# Patient Record
Sex: MALE | Race: WHITE | Employment: OTHER | URBAN - METROPOLITAN AREA
[De-identification: names, ages, dates, MRNs, and addresses within clinical notes are randomized per-mention and may not be internally consistent; named-entity substitution may affect disease eponyms.]

---

## 2017-03-01 ENCOUNTER — LAB ENCOUNTER (OUTPATIENT)
Dept: LAB | Age: 77
End: 2017-03-01
Attending: NURSE PRACTITIONER
Payer: MEDICARE

## 2017-03-01 DIAGNOSIS — Z13.29 SCREENING FOR THYROID DISORDER: ICD-10-CM

## 2017-03-01 DIAGNOSIS — Z13.0 SCREENING FOR BLOOD DISEASE: ICD-10-CM

## 2017-03-01 DIAGNOSIS — Z13.228 SCREENING FOR METABOLIC DISORDER: ICD-10-CM

## 2017-03-01 DIAGNOSIS — Z13.220 SCREENING FOR LIPID DISORDERS: ICD-10-CM

## 2017-03-01 DIAGNOSIS — Z12.5 SCREENING FOR PROSTATE CANCER: ICD-10-CM

## 2017-03-01 LAB
ALBUMIN SERPL-MCNC: 3.7 G/DL (ref 3.5–4.8)
ALP LIVER SERPL-CCNC: 58 U/L (ref 45–117)
ALT SERPL-CCNC: 30 U/L (ref 17–63)
AST SERPL-CCNC: 24 U/L (ref 15–41)
BASOPHILS # BLD AUTO: 0.02 X10(3) UL (ref 0–0.1)
BASOPHILS NFR BLD AUTO: 0.3 %
BILIRUB SERPL-MCNC: 0.7 MG/DL (ref 0.1–2)
BUN BLD-MCNC: 19 MG/DL (ref 8–20)
CALCIUM BLD-MCNC: 8.9 MG/DL (ref 8.3–10.3)
CHLORIDE: 110 MMOL/L (ref 101–111)
CHOLEST SMN-MCNC: 194 MG/DL (ref ?–200)
CO2: 29 MMOL/L (ref 22–32)
COMPLEXED PSA SERPL-MCNC: 2.91 NG/ML (ref 0.01–4)
CREAT BLD-MCNC: 1.09 MG/DL (ref 0.7–1.3)
EOSINOPHIL # BLD AUTO: 0.17 X10(3) UL (ref 0–0.3)
EOSINOPHIL NFR BLD AUTO: 2.7 %
ERYTHROCYTE [DISTWIDTH] IN BLOOD BY AUTOMATED COUNT: 12.6 % (ref 11.5–16)
GLUCOSE BLD-MCNC: 101 MG/DL (ref 70–99)
HCT VFR BLD AUTO: 47.2 % (ref 37–53)
HDLC SERPL-MCNC: 52 MG/DL (ref 45–?)
HDLC SERPL: 3.73 {RATIO} (ref ?–4.97)
HGB BLD-MCNC: 16.3 G/DL (ref 13–17)
IMMATURE GRANULOCYTE COUNT: 0.03 X10(3) UL (ref 0–1)
IMMATURE GRANULOCYTE RATIO %: 0.5 %
LDLC SERPL CALC-MCNC: 113 MG/DL (ref ?–130)
LYMPHOCYTES # BLD AUTO: 2.34 X10(3) UL (ref 0.9–4)
LYMPHOCYTES NFR BLD AUTO: 37.4 %
M PROTEIN MFR SERPL ELPH: 7.2 G/DL (ref 6.1–8.3)
MCH RBC QN AUTO: 31.8 PG (ref 27–33.2)
MCHC RBC AUTO-ENTMCNC: 34.5 G/DL (ref 31–37)
MCV RBC AUTO: 92.2 FL (ref 80–99)
MONOCYTES # BLD AUTO: 0.54 X10(3) UL (ref 0.1–0.6)
MONOCYTES NFR BLD AUTO: 8.6 %
NEUTROPHIL ABS PRELIM: 3.15 X10 (3) UL (ref 1.3–6.7)
NEUTROPHILS # BLD AUTO: 3.15 X10(3) UL (ref 1.3–6.7)
NEUTROPHILS NFR BLD AUTO: 50.5 %
NONHDLC SERPL-MCNC: 142 MG/DL (ref ?–130)
PLATELET # BLD AUTO: 189 10(3)UL (ref 150–450)
POTASSIUM SERPL-SCNC: 4.2 MMOL/L (ref 3.6–5.1)
RBC # BLD AUTO: 5.12 X10(6)UL (ref 3.8–5.8)
RED CELL DISTRIBUTION WIDTH-SD: 42.7 FL (ref 35.1–46.3)
SODIUM SERPL-SCNC: 146 MMOL/L (ref 136–144)
TRIGLYCERIDES: 146 MG/DL (ref ?–150)
TSI SER-ACNC: 3.54 MIU/ML (ref 0.35–5.5)
VLDL: 29 MG/DL (ref 5–40)
WBC # BLD AUTO: 6.3 X10(3) UL (ref 4–13)

## 2017-03-01 PROCEDURE — 80061 LIPID PANEL: CPT

## 2017-03-01 PROCEDURE — 36415 COLL VENOUS BLD VENIPUNCTURE: CPT

## 2017-03-01 PROCEDURE — 80053 COMPREHEN METABOLIC PANEL: CPT

## 2017-03-01 PROCEDURE — 85025 COMPLETE CBC W/AUTO DIFF WBC: CPT

## 2017-03-01 PROCEDURE — 84443 ASSAY THYROID STIM HORMONE: CPT

## 2017-03-05 PROBLEM — R73.9 HYPERGLYCEMIA: Status: ACTIVE | Noted: 2017-03-05

## 2017-03-05 PROBLEM — N52.9 ED (ERECTILE DYSFUNCTION): Status: ACTIVE | Noted: 2017-03-05

## 2017-03-06 ENCOUNTER — OFFICE VISIT (OUTPATIENT)
Dept: INTERNAL MEDICINE CLINIC | Facility: CLINIC | Age: 77
End: 2017-03-06

## 2017-03-06 VITALS
TEMPERATURE: 98 F | DIASTOLIC BLOOD PRESSURE: 72 MMHG | HEIGHT: 69 IN | RESPIRATION RATE: 16 BRPM | HEART RATE: 68 BPM | BODY MASS INDEX: 28.07 KG/M2 | SYSTOLIC BLOOD PRESSURE: 132 MMHG | WEIGHT: 189.5 LBS

## 2017-03-06 DIAGNOSIS — E78.5 DYSLIPIDEMIA: ICD-10-CM

## 2017-03-06 DIAGNOSIS — E55.9 VITAMIN D DEFICIENCY: ICD-10-CM

## 2017-03-06 DIAGNOSIS — Z00.00 ROUTINE GENERAL MEDICAL EXAMINATION AT A HEALTH CARE FACILITY: Primary | ICD-10-CM

## 2017-03-06 DIAGNOSIS — R73.9 HYPERGLYCEMIA: ICD-10-CM

## 2017-03-06 DIAGNOSIS — Z12.11 SCREEN FOR COLON CANCER: ICD-10-CM

## 2017-03-06 DIAGNOSIS — R03.0 ELEVATED BP WITHOUT DIAGNOSIS OF HYPERTENSION: ICD-10-CM

## 2017-03-06 DIAGNOSIS — N40.0 BENIGN PROSTATIC HYPERPLASIA, PRESENCE OF LOWER URINARY TRACT SYMPTOMS UNSPECIFIED, UNSPECIFIED MORPHOLOGY: ICD-10-CM

## 2017-03-06 PROCEDURE — 90732 PPSV23 VACC 2 YRS+ SUBQ/IM: CPT | Performed by: NURSE PRACTITIONER

## 2017-03-06 PROCEDURE — 90471 IMMUNIZATION ADMIN: CPT | Performed by: NURSE PRACTITIONER

## 2017-03-06 PROCEDURE — 90736 HZV VACCINE LIVE SUBQ: CPT | Performed by: NURSE PRACTITIONER

## 2017-03-06 PROCEDURE — G0009 ADMIN PNEUMOCOCCAL VACCINE: HCPCS | Performed by: NURSE PRACTITIONER

## 2017-03-06 PROCEDURE — G0439 PPPS, SUBSEQ VISIT: HCPCS | Performed by: NURSE PRACTITIONER

## 2017-03-06 NOTE — PROGRESS NOTES
Armand Vargas is a 67year old male who presents for a complete physical exam.     HPI:   Pt complains of    Dyslipidemia  Simvastatin   Chol to goal.  No c/o  Tolerating well. Vitamin d deficiency  Continues to take vit d supplement.       Debi CATARACT      COLONOSCOPY        Family History   Problem Relation Age of Onset   • Heart Disorder Father    • Cancer Mother      leukemia   • Heart Disorder Brother    • Other[other] [OTHER] Brother      buergers   • Behzad Lu Brother auscultation  CARDIO: RRR without murmur  GI: normal BS, soft, no masses, HSM or tenderness  : two descended testes,no masses,no hernia,no penile lesions  RECTAL: prostate not enlarged.  no mass  MUSCULOSKELETAL: back is not tender  EXTREMITIES: no edema

## 2017-03-27 RX ORDER — SIMVASTATIN 10 MG
TABLET ORAL
Qty: 30 TABLET | Refills: 0 | Status: SHIPPED | OUTPATIENT
Start: 2017-03-27 | End: 2017-04-23

## 2017-04-24 RX ORDER — SIMVASTATIN 10 MG
TABLET ORAL
Qty: 90 TABLET | Refills: 3 | Status: SHIPPED | OUTPATIENT
Start: 2017-04-24 | End: 2017-08-23

## 2017-07-07 ENCOUNTER — PATIENT MESSAGE (OUTPATIENT)
Dept: INTERNAL MEDICINE CLINIC | Facility: CLINIC | Age: 77
End: 2017-07-07

## 2017-07-07 NOTE — TELEPHONE ENCOUNTER
From: [facility]  To: MILAGROS Aleman  Sent: 7/7/2017 11:48 AM CDT  Subject: Prescription Question    My pharmacy has not received the prescription renewal for Simvastatin 10 mg. It was due by the end of April (I think) .

## 2017-08-23 ENCOUNTER — OFFICE VISIT (OUTPATIENT)
Dept: INTERNAL MEDICINE CLINIC | Facility: CLINIC | Age: 77
End: 2017-08-23

## 2017-08-23 ENCOUNTER — TELEPHONE (OUTPATIENT)
Dept: INTERNAL MEDICINE CLINIC | Facility: CLINIC | Age: 77
End: 2017-08-23

## 2017-08-23 ENCOUNTER — APPOINTMENT (OUTPATIENT)
Dept: GENERAL RADIOLOGY | Facility: HOSPITAL | Age: 77
DRG: 234 | End: 2017-08-23
Attending: EMERGENCY MEDICINE
Payer: COMMERCIAL

## 2017-08-23 ENCOUNTER — HOSPITAL ENCOUNTER (INPATIENT)
Facility: HOSPITAL | Age: 77
LOS: 11 days | Discharge: HOME HEALTH | DRG: 234 | End: 2017-09-03
Attending: EMERGENCY MEDICINE | Admitting: THORACIC SURGERY (CARDIOTHORACIC VASCULAR SURGERY)
Payer: COMMERCIAL

## 2017-08-23 VITALS
DIASTOLIC BLOOD PRESSURE: 82 MMHG | SYSTOLIC BLOOD PRESSURE: 110 MMHG | BODY MASS INDEX: 28.44 KG/M2 | HEIGHT: 69 IN | HEART RATE: 80 BPM | WEIGHT: 192 LBS | TEMPERATURE: 98 F

## 2017-08-23 DIAGNOSIS — E78.5 DYSLIPIDEMIA: ICD-10-CM

## 2017-08-23 DIAGNOSIS — R07.9 ACUTE CHEST PAIN: Primary | ICD-10-CM

## 2017-08-23 DIAGNOSIS — R07.9 EXERTIONAL CHEST PAIN: Primary | ICD-10-CM

## 2017-08-23 LAB
ALBUMIN SERPL-MCNC: 3.4 G/DL (ref 3.5–4.8)
ALP LIVER SERPL-CCNC: 54 U/L (ref 45–117)
ALT SERPL-CCNC: 38 U/L (ref 17–63)
APTT PPP: 30.4 SECONDS (ref 25–34)
AST SERPL-CCNC: 30 U/L (ref 15–41)
ATRIAL RATE: 71 BPM
BASOPHILS # BLD AUTO: 0.02 X10(3) UL (ref 0–0.1)
BASOPHILS NFR BLD AUTO: 0.3 %
BILIRUB SERPL-MCNC: 0.7 MG/DL (ref 0.1–2)
BUN BLD-MCNC: 19 MG/DL (ref 8–20)
CALCIUM BLD-MCNC: 8.9 MG/DL (ref 8.3–10.3)
CHLORIDE: 110 MMOL/L (ref 101–111)
CO2: 26 MMOL/L (ref 22–32)
CREAT BLD-MCNC: 1.14 MG/DL (ref 0.7–1.3)
EOSINOPHIL # BLD AUTO: 0.15 X10(3) UL (ref 0–0.3)
EOSINOPHIL NFR BLD AUTO: 2.3 %
ERYTHROCYTE [DISTWIDTH] IN BLOOD BY AUTOMATED COUNT: 12.6 % (ref 11.5–16)
GLUCOSE BLD-MCNC: 94 MG/DL (ref 70–99)
HCT VFR BLD AUTO: 44.5 % (ref 37–53)
HGB BLD-MCNC: 15.2 G/DL (ref 13–17)
IMMATURE GRANULOCYTE COUNT: 0.01 X10(3) UL (ref 0–1)
IMMATURE GRANULOCYTE RATIO %: 0.2 %
LYMPHOCYTES # BLD AUTO: 2.25 X10(3) UL (ref 0.9–4)
LYMPHOCYTES NFR BLD AUTO: 34.8 %
M PROTEIN MFR SERPL ELPH: 6.9 G/DL (ref 6.1–8.3)
MCH RBC QN AUTO: 30.9 PG (ref 27–33.2)
MCHC RBC AUTO-ENTMCNC: 34.2 G/DL (ref 31–37)
MCV RBC AUTO: 90.4 FL (ref 80–99)
MONOCYTES # BLD AUTO: 0.52 X10(3) UL (ref 0.1–0.6)
MONOCYTES NFR BLD AUTO: 8 %
NEUTROPHIL ABS PRELIM: 3.51 X10 (3) UL (ref 1.3–6.7)
NEUTROPHILS # BLD AUTO: 3.51 X10(3) UL (ref 1.3–6.7)
NEUTROPHILS NFR BLD AUTO: 54.4 %
P AXIS: 19 DEGREES
P-R INTERVAL: 150 MS
PLATELET # BLD AUTO: 176 10(3)UL (ref 150–450)
POTASSIUM SERPL-SCNC: 4.1 MMOL/L (ref 3.6–5.1)
Q-T INTERVAL: 392 MS
QRS DURATION: 112 MS
QTC CALCULATION (BEZET): 425 MS
R AXIS: -31 DEGREES
RBC # BLD AUTO: 4.92 X10(6)UL (ref 3.8–5.8)
RED CELL DISTRIBUTION WIDTH-SD: 42.2 FL (ref 35.1–46.3)
SODIUM SERPL-SCNC: 142 MMOL/L (ref 136–144)
T AXIS: 46 DEGREES
TROPONIN: 0.05 NG/ML (ref ?–0.05)
TROPONIN: 0.05 NG/ML (ref ?–0.05)
VENTRICULAR RATE: 71 BPM
WBC # BLD AUTO: 6.5 X10(3) UL (ref 4–13)

## 2017-08-23 PROCEDURE — 93000 ELECTROCARDIOGRAM COMPLETE: CPT | Performed by: NURSE PRACTITIONER

## 2017-08-23 PROCEDURE — 99223 1ST HOSP IP/OBS HIGH 75: CPT | Performed by: HOSPITALIST

## 2017-08-23 PROCEDURE — 71010 XR CHEST AP PORTABLE  (CPT=71010): CPT | Performed by: EMERGENCY MEDICINE

## 2017-08-23 PROCEDURE — 99214 OFFICE O/P EST MOD 30 MIN: CPT | Performed by: NURSE PRACTITIONER

## 2017-08-23 RX ORDER — HEPARIN SODIUM 5000 [USP'U]/ML
60 INJECTION INTRAVENOUS; SUBCUTANEOUS ONCE
Status: COMPLETED | OUTPATIENT
Start: 2017-08-23 | End: 2017-08-23

## 2017-08-23 RX ORDER — SODIUM CHLORIDE 9 MG/ML
INJECTION, SOLUTION INTRAVENOUS CONTINUOUS
Status: ACTIVE | OUTPATIENT
Start: 2017-08-23 | End: 2017-08-23

## 2017-08-23 RX ORDER — SODIUM CHLORIDE 9 MG/ML
INJECTION, SOLUTION INTRAVENOUS CONTINUOUS
Status: DISCONTINUED | OUTPATIENT
Start: 2017-08-23 | End: 2017-08-28

## 2017-08-23 RX ORDER — ONDANSETRON 2 MG/ML
4 INJECTION INTRAMUSCULAR; INTRAVENOUS EVERY 6 HOURS PRN
Status: DISCONTINUED | OUTPATIENT
Start: 2017-08-23 | End: 2017-08-28

## 2017-08-23 RX ORDER — HEPARIN SODIUM AND DEXTROSE 10000; 5 [USP'U]/100ML; G/100ML
12 INJECTION INTRAVENOUS ONCE
Status: COMPLETED | OUTPATIENT
Start: 2017-08-23 | End: 2017-08-24

## 2017-08-23 RX ORDER — FOLIC ACID/MULTIVIT,IRON,MINER 0.4MG-18MG
1 TABLET ORAL DAILY
COMMUNITY
End: 2021-02-06

## 2017-08-23 RX ORDER — METOPROLOL TARTRATE 5 MG/5ML
5 INJECTION INTRAVENOUS ONCE
Status: DISCONTINUED | OUTPATIENT
Start: 2017-08-23 | End: 2017-08-23

## 2017-08-23 RX ORDER — AMOXICILLIN 500 MG
1 CAPSULE ORAL DAILY
Status: DISCONTINUED | OUTPATIENT
Start: 2017-08-23 | End: 2017-08-23

## 2017-08-23 RX ORDER — MORPHINE SULFATE 4 MG/ML
2 INJECTION, SOLUTION INTRAMUSCULAR; INTRAVENOUS EVERY 2 HOUR PRN
Status: DISCONTINUED | OUTPATIENT
Start: 2017-08-23 | End: 2017-08-29

## 2017-08-23 RX ORDER — SIMVASTATIN 10 MG
10 TABLET ORAL NIGHTLY
COMMUNITY
End: 2019-06-24

## 2017-08-23 RX ORDER — ACETAMINOPHEN 325 MG/1
650 TABLET ORAL EVERY 6 HOURS PRN
Status: DISCONTINUED | OUTPATIENT
Start: 2017-08-23 | End: 2017-08-28

## 2017-08-23 RX ORDER — MORPHINE SULFATE 4 MG/ML
1 INJECTION, SOLUTION INTRAMUSCULAR; INTRAVENOUS EVERY 2 HOUR PRN
Status: DISCONTINUED | OUTPATIENT
Start: 2017-08-23 | End: 2017-08-29

## 2017-08-23 RX ORDER — PRAVASTATIN SODIUM 20 MG
20 TABLET ORAL NIGHTLY
Status: DISCONTINUED | OUTPATIENT
Start: 2017-08-23 | End: 2017-09-03

## 2017-08-23 RX ORDER — ONDANSETRON 2 MG/ML
4 INJECTION INTRAMUSCULAR; INTRAVENOUS EVERY 4 HOURS PRN
Status: DISCONTINUED | OUTPATIENT
Start: 2017-08-23 | End: 2017-08-23

## 2017-08-23 RX ORDER — HEPARIN SODIUM AND DEXTROSE 10000; 5 [USP'U]/100ML; G/100ML
INJECTION INTRAVENOUS CONTINUOUS
Status: DISCONTINUED | OUTPATIENT
Start: 2017-08-23 | End: 2017-08-28

## 2017-08-23 RX ORDER — MORPHINE SULFATE 4 MG/ML
4 INJECTION, SOLUTION INTRAMUSCULAR; INTRAVENOUS EVERY 2 HOUR PRN
Status: DISCONTINUED | OUTPATIENT
Start: 2017-08-23 | End: 2017-08-29

## 2017-08-23 RX ORDER — NITROGLYCERIN 0.4 MG/1
0.4 TABLET SUBLINGUAL EVERY 5 MIN PRN
Status: DISCONTINUED | OUTPATIENT
Start: 2017-08-23 | End: 2017-08-28

## 2017-08-23 NOTE — ED INITIAL ASSESSMENT (HPI)
66 y/o male to ED with c/o of chest pressure with ambulation that started yesterday night. Patient reports radiation to left shoulder. Patient saw PCP today, normal EKG, sent to ED for further work up. Patient denies dyspnea.

## 2017-08-23 NOTE — ED PROVIDER NOTES
Patient Seen in: [facility] Emergency Department    History   Patient presents with:  Chest Pain Angina (cardiovascular)    Stated Complaint: chest pain with ambulation    HPI  This is a 76-year-old male who arrives here with complaints of chest blake Smokeless tobacco: Never Used                      Alcohol use: No                Review of Systems    Positive for stated complaint: chest pain with ambulation  Other systems are as noted in HPI. Constitutional and vital signs reviewed.       All othe components within normal limits   PTT, ACTIVATED - Normal    Narrative: The aPTT Heparin Therapeutic Range is approximately 65- 104 seconds. The therapeutic range has been validated against 0.3-0.7 heparin anti-Xa units/mL.      CBC W/ DIFFERENTIAL - No on repeat exam he has no complaints of chest pain, shortness of breath the story and concerning for unstable angina.     Disposition and Plan     Clinical Impression:  Acute chest pain  (primary encounter diagnosis)  Unstable angina  Disposition:  Admit

## 2017-08-23 NOTE — H&P
[facility]  History and Physical     Emry Bleak Patient Status:  Emergency    1940 MRN [id number]   Location [address] [facility] Attending Sydni Kay MD   Hosp Day # 0 PCP Sara Jay MD     Chief C tablet Rfl: 3   Omega-3 Fatty Acids (FISH OIL) 1200 MG Oral Cap Take 1 capsule by mouth daily. Disp:  Rfl:        Review of Systems:   A comprehensive 14 point review of systems was completed. Pertinent positives and negatives noted in the HPI.     Physi

## 2017-08-23 NOTE — TELEPHONE ENCOUNTER
3/16/17 with SD Pt to RTC 1year  Pt states that about a week ago he noticed pain \"on the heart region that radiates to left shoulder left shoulder- only when he walks\".  Pain is 4/10 but is not getting worst.   Pt denies n/t, palpitations, swelling of leg

## 2017-08-23 NOTE — TELEPHONE ENCOUNTER
Patient scheduled appt with TB Friday 8/25 for Pain in the heart region when walking. ...okay to wait that long? Please advise?

## 2017-08-23 NOTE — PROGRESS NOTES
Amelie Bentley is a 65year old male. Patient presents with:  Chest Pain: worse when he walks. He's had it for about 1 week      HPI:   Presents for eval of chest pain.    2 weeks ago he was walking when he developed midsternal chest pain with left cough  CARDIOVASCULAR: as above  GI: denies abdominal pain and denies heartburn, no diarrhea or constipation  MUSCULOSKELETAL:  No arthralgias or myalgias  NEURO: denies headaches,     EXAM:   /82 (BP Location: Right arm, Patient Position: Sitting, C

## 2017-08-24 ENCOUNTER — APPOINTMENT (OUTPATIENT)
Dept: INTERVENTIONAL RADIOLOGY/VASCULAR | Facility: HOSPITAL | Age: 77
DRG: 234 | End: 2017-08-24
Attending: INTERNAL MEDICINE
Payer: COMMERCIAL

## 2017-08-24 ENCOUNTER — APPOINTMENT (OUTPATIENT)
Dept: CV DIAGNOSTICS | Facility: HOSPITAL | Age: 77
DRG: 234 | End: 2017-08-24
Attending: HOSPITALIST
Payer: COMMERCIAL

## 2017-08-24 ENCOUNTER — PRIOR ORIGINAL RECORDS (OUTPATIENT)
Dept: OTHER | Age: 77
End: 2017-08-24

## 2017-08-24 ENCOUNTER — APPOINTMENT (OUTPATIENT)
Dept: ULTRASOUND IMAGING | Facility: HOSPITAL | Age: 77
DRG: 234 | End: 2017-08-24
Attending: PHYSICIAN ASSISTANT
Payer: COMMERCIAL

## 2017-08-24 LAB
APTT PPP: 42 SECONDS (ref 25–34)
APTT PPP: 54.9 SECONDS (ref 25–34)
APTT PPP: 97.4 SECONDS (ref 25–34)
ATRIAL RATE: 56 BPM
BASOPHILS # BLD AUTO: 0.02 X10(3) UL (ref 0–0.1)
BASOPHILS NFR BLD AUTO: 0.4 %
BUN BLD-MCNC: 20 MG/DL (ref 8–20)
CALCIUM BLD-MCNC: 8.5 MG/DL (ref 8.3–10.3)
CHLORIDE: 111 MMOL/L (ref 101–111)
CHOLEST SMN-MCNC: 170 MG/DL (ref ?–200)
CO2: 26 MMOL/L (ref 22–32)
CREAT BLD-MCNC: 1.04 MG/DL (ref 0.7–1.3)
EOSINOPHIL # BLD AUTO: 0.15 X10(3) UL (ref 0–0.3)
EOSINOPHIL NFR BLD AUTO: 3 %
ERYTHROCYTE [DISTWIDTH] IN BLOOD BY AUTOMATED COUNT: 12.7 % (ref 11.5–16)
EST. AVERAGE GLUCOSE BLD GHB EST-MCNC: 126 MG/DL (ref 68–126)
GLUCOSE BLD-MCNC: 175 MG/DL (ref 70–99)
HBA1C MFR BLD HPLC: 6 % (ref ?–5.7)
HCT VFR BLD AUTO: 45.1 % (ref 37–53)
HDLC SERPL-MCNC: 44 MG/DL (ref 45–?)
HDLC SERPL: 3.86 {RATIO} (ref ?–4.97)
HGB BLD-MCNC: 15.1 G/DL (ref 13–17)
IMMATURE GRANULOCYTE COUNT: 0.02 X10(3) UL (ref 0–1)
IMMATURE GRANULOCYTE RATIO %: 0.4 %
INR BLD: 1.02 (ref 0.89–1.11)
ISTAT ACTIVATED CLOTTING TIME: 114 SECONDS (ref 74–137)
ISTAT ACTIVATED CLOTTING TIME: 125 SECONDS (ref 74–137)
LDLC SERPL CALC-MCNC: 97 MG/DL (ref ?–130)
LDLC SERPL-MCNC: 29 MG/DL (ref 5–40)
LYMPHOCYTES # BLD AUTO: 1.47 X10(3) UL (ref 0.9–4)
LYMPHOCYTES NFR BLD AUTO: 29.6 %
MCH RBC QN AUTO: 30.7 PG (ref 27–33.2)
MCHC RBC AUTO-ENTMCNC: 33.5 G/DL (ref 31–37)
MCV RBC AUTO: 91.7 FL (ref 80–99)
MONOCYTES # BLD AUTO: 0.32 X10(3) UL (ref 0.1–0.6)
MONOCYTES NFR BLD AUTO: 6.4 %
NEUTROPHIL ABS PRELIM: 2.99 X10 (3) UL (ref 1.3–6.7)
NEUTROPHILS # BLD AUTO: 2.99 X10(3) UL (ref 1.3–6.7)
NEUTROPHILS NFR BLD AUTO: 60.2 %
NONHDLC SERPL-MCNC: 126 MG/DL (ref ?–130)
P AXIS: 23 DEGREES
P-R INTERVAL: 168 MS
PLATELET # BLD AUTO: 164 10(3)UL (ref 150–450)
POTASSIUM SERPL-SCNC: 3.9 MMOL/L (ref 3.6–5.1)
PSA SERPL DL<=0.01 NG/ML-MCNC: 13.4 SECONDS (ref 12–14.3)
Q-T INTERVAL: 460 MS
QRS DURATION: 120 MS
QTC CALCULATION (BEZET): 443 MS
R AXIS: -40 DEGREES
RBC # BLD AUTO: 4.92 X10(6)UL (ref 3.8–5.8)
RED CELL DISTRIBUTION WIDTH-SD: 43 FL (ref 35.1–46.3)
SODIUM SERPL-SCNC: 143 MMOL/L (ref 136–144)
T AXIS: 14 DEGREES
TRIGLYCERIDES: 143 MG/DL (ref ?–150)
TROPONIN: <0.046 NG/ML (ref ?–0.05)
VENTRICULAR RATE: 56 BPM
WBC # BLD AUTO: 5 X10(3) UL (ref 4–13)

## 2017-08-24 PROCEDURE — 4A023N7 MEASUREMENT OF CARDIAC SAMPLING AND PRESSURE, LEFT HEART, PERCUTANEOUS APPROACH: ICD-10-PCS | Performed by: INTERNAL MEDICINE

## 2017-08-24 PROCEDURE — B215YZZ FLUOROSCOPY OF LEFT HEART USING OTHER CONTRAST: ICD-10-PCS | Performed by: INTERNAL MEDICINE

## 2017-08-24 PROCEDURE — 93306 TTE W/DOPPLER COMPLETE: CPT | Performed by: HOSPITALIST

## 2017-08-24 PROCEDURE — 93880 EXTRACRANIAL BILAT STUDY: CPT | Performed by: PHYSICIAN ASSISTANT

## 2017-08-24 PROCEDURE — B211YZZ FLUOROSCOPY OF MULTIPLE CORONARY ARTERIES USING OTHER CONTRAST: ICD-10-PCS | Performed by: INTERNAL MEDICINE

## 2017-08-24 PROCEDURE — 99233 SBSQ HOSP IP/OBS HIGH 50: CPT | Performed by: HOSPITALIST

## 2017-08-24 RX ORDER — ACETAMINOPHEN 325 MG/1
650 TABLET ORAL EVERY 4 HOURS PRN
Status: DISCONTINUED | OUTPATIENT
Start: 2017-08-24 | End: 2017-08-28 | Stop reason: HOSPADM

## 2017-08-24 RX ORDER — ALFUZOSIN HYDROCHLORIDE 10 MG/1
10 TABLET, EXTENDED RELEASE ORAL
Status: DISCONTINUED | OUTPATIENT
Start: 2017-08-25 | End: 2017-09-03

## 2017-08-24 RX ORDER — MIDAZOLAM HYDROCHLORIDE 1 MG/ML
INJECTION INTRAMUSCULAR; INTRAVENOUS
Status: COMPLETED
Start: 2017-08-24 | End: 2017-08-24

## 2017-08-24 RX ORDER — LIDOCAINE HYDROCHLORIDE 10 MG/ML
INJECTION, SOLUTION INFILTRATION; PERINEURAL
Status: COMPLETED
Start: 2017-08-24 | End: 2017-08-24

## 2017-08-24 RX ORDER — SODIUM CHLORIDE 9 MG/ML
INJECTION, SOLUTION INTRAVENOUS CONTINUOUS
Status: ACTIVE | OUTPATIENT
Start: 2017-08-24 | End: 2017-08-24

## 2017-08-24 RX ORDER — ACETAMINOPHEN AND CODEINE PHOSPHATE 300; 30 MG/1; MG/1
1 TABLET ORAL EVERY 4 HOURS PRN
Status: DISCONTINUED | OUTPATIENT
Start: 2017-08-24 | End: 2017-08-28 | Stop reason: HOSPADM

## 2017-08-24 RX ORDER — ASPIRIN 81 MG/1
324 TABLET, CHEWABLE ORAL ONCE
Status: DISCONTINUED | OUTPATIENT
Start: 2017-08-24 | End: 2017-08-24 | Stop reason: CLARIF

## 2017-08-24 RX ORDER — ACETAMINOPHEN AND CODEINE PHOSPHATE 300; 30 MG/1; MG/1
2 TABLET ORAL EVERY 4 HOURS PRN
Status: DISCONTINUED | OUTPATIENT
Start: 2017-08-24 | End: 2017-08-28 | Stop reason: HOSPADM

## 2017-08-24 RX ORDER — HEPARIN SODIUM 5000 [USP'U]/ML
INJECTION, SOLUTION INTRAVENOUS; SUBCUTANEOUS
Status: COMPLETED
Start: 2017-08-24 | End: 2017-08-24

## 2017-08-24 NOTE — PROGRESS NOTES
08/23/17 2017 08/23/17 2019 08/23/17 2020   Vital Signs   Pulse 65 72 72   Heart Rate Source Monitor --  --    Resp 18 --  --    Respiratory Quality Normal --  --    /61  (Simultaneous filing.  User may not have seen previous data.) 147/77  (Simult

## 2017-08-24 NOTE — PLAN OF CARE
CARDIOVASCULAR - ADULT    • Maintains optimal cardiac output and hemodynamic stability Not Progressing    • Absence of cardiac arrhythmias or at baseline Not Progressing        PAIN - ADULT    • Verbalizes/displays adequate comfort level or patient's state

## 2017-08-24 NOTE — PAYOR COMM NOTE
--------------  ADMISSION REVIEW     Payor: [address] [address]  Subscriber #:  [id number]  Authorization Number: N/A    Admit date: 8/23/17  Admit time: 1955       Admitting Physician: Bhavik Salas MD  Attending Physician:  Boy Stokes MD  Johnathon Sender daily.   simvastatin 10 MG Oral Tab,  Take 10 mg by mouth nightly.     Family History   Problem Relation Age of Onset   • Heart Disorder Father    • Cancer Mother      leukemia   • Heart Disorder Brother    • Other[other] [OTHER] Brother      buergers • O limits   TROPONIN I - Abnormal; Notable for the following:     Troponin 0.054 (*)     All other components within normal limits   TROPONIN I - Abnormal; Notable for the following:     Troponin 0.049 (*)     All other components within normal limits   PTT, Emergency   Location [facility] [facility] Attending Erica Ervin MD   Hosp Day # 0 PCP Janyce Krabbe, MD     Chief Complaint: Chest pain    History of Present Illness: Elton Cough is a 65year old male with dyslipidemia wh CREATSERUM  1.14   CA  8.9   ALB  3.4*   NA  142   K  4.1   CL  110   CO2  26.0   ALKPHO  54   AST  30   ALT  38   BILT  0.7   TP  6.9   Estimated Creatinine Clearance: 53.4 mL/min (based on SCr of 1.14 mg/dL).   No results for input(s): PTP, INR in the l Leslie RN      metoprolol succinate (Toprol XL) partial tablet 12.5 mg     Date Action Dose Route User    8/23/2017 1842 Given 12.5 mg Oral Doorteo Au RN      metoprolol Tartrate (LOPRESSOR) tab 25 mg     Date Action Dose Route User    8/24/2017 737

## 2017-08-24 NOTE — ED NOTES
Report given to floor RN, patient and wife updated on bed assignment, patient awaiting transportation.

## 2017-08-24 NOTE — CONSULTS
[facility]  Report of Consultation    Elaine Lin Patient Status:  Inpatient    1940 MRN [id number]   [facility] 8NE-A Attending Yojana Sheldon MD   Hosp Day # 0 PCP Heron Silva MD     Reason for Consultation: mg, Oral, Q6H PRN  •  ondansetron HCl (ZOFRAN) injection 4 mg, 4 mg, Intravenous, Q6H PRN  •  heparin (PORCINE) 91868lialo/250mL infusion ED INITIAL DOSE, 12 Units/kg/hr, Intravenous, Once  •  heparin (PORCINE) drip 15314ujxnf/250mL infusion CONTINUOUS, 20

## 2017-08-24 NOTE — PROGRESS NOTES
[facility]  Progress Note     Osmel Mark Patient Status:  Inpatient    1940 MRN [id number]   [facility] 8NE-A Attending Angelica Byrnes MD   Hosp Day # 1 PCP Savage Ashley MD     Chief Complaint:   Chest pain x1 w      Plan of care:   [facility]  Now   Heparin drip   Echo results P   BB   Add A1c to labs     Quality:  · DVT Prophylaxis: heparin drip  · CODE status: full  · Partida: no  · Central line: no      Estimated date of discharge:  TBD  Discharge is dependent on: fi

## 2017-08-24 NOTE — PROCEDURES
Procedure dictated. ASA anaphylaxis history. Unstable angina pectoris. Troponin 0.05.     LHC  LV-gram    99% prox LAD  Moderate mid LAD disease at the diag bifurcation  No other significant CAD angiographically    LVEF 60%, with becky-lateral and api

## 2017-08-24 NOTE — PROGRESS NOTES
BACK FROM CATH LAB,ALERT BUT SLEEPY,RIGHT GROIN WITH SHEATH X1 IN PLACE,NO BLEEDING OR HEMATOMA,PEDAL PULSES PALPABLE,IV NS AT 50 CC/HR,DENIES C/O CHEST PAIN,TELE SB WITH HR-50S,ROUTINE POST-CATH CARE RENDERED,BEDREST FOR NOW,WILL CHECK FOR ACT AND PULL SH

## 2017-08-24 NOTE — CM/SW NOTE
Rounds- spoke w/ RN  plan cath today, no other needs     Sunday Montiel RN/CM  NICU  [id number] [phone]

## 2017-08-24 NOTE — PROGRESS NOTES
CV Surgery     Pt seen and examined by Dr Ramon Del Toro. PLAN for CABG on Monday.          Ruba Fisher PA-C/Ozzy   8/24/2017  3:46 PM

## 2017-08-24 NOTE — PROCEDURES
[facility]    PATIENT'S NAME: Luis Armando Miller   ATTENDING PHYSICIAN: Elda Dodd M.D. OPERATING PHYSICIAN: Roque Gray M.D.    PATIENT ACCOUNT#:   [de-identified]    LOCATION:  [address] [place] [address]  MEDICAL RECORD #:   [id number]       DATE OF rate of 68 beats per minute. SELECTIVE CORONARY ANGIOGRAPHY FINDINGS:  Left main coronary artery had no  disease angiographically. LAD artery had 99% proximal stenosis pinching the ostium of the first septal  branch.   The mid LAD artery had d catheterization lab since he has allergy to aspirin with anaphylactic reaction and desensitization was not advised. Brilinta was chosen since there is a higher risk of in-stent thrombosis on single antiplatelet agent.   Brilinta seems to be safer, but ther

## 2017-08-25 PROBLEM — I25.10 CAD (CORONARY ARTERY DISEASE): Status: ACTIVE | Noted: 2017-08-25

## 2017-08-25 LAB
APTT PPP: 63 SECONDS (ref 25–34)
BASOPHILS # BLD AUTO: 0.02 X10(3) UL (ref 0–0.1)
BASOPHILS NFR BLD AUTO: 0.3 %
BUN BLD-MCNC: 18 MG/DL (ref 8–20)
CALCIUM BLD-MCNC: 8.1 MG/DL (ref 8.3–10.3)
CHLORIDE: 113 MMOL/L (ref 101–111)
CO2: 24 MMOL/L (ref 22–32)
CREAT BLD-MCNC: 0.9 MG/DL (ref 0.7–1.3)
EOSINOPHIL # BLD AUTO: 0.18 X10(3) UL (ref 0–0.3)
EOSINOPHIL NFR BLD AUTO: 2.4 %
ERYTHROCYTE [DISTWIDTH] IN BLOOD BY AUTOMATED COUNT: 12.7 % (ref 11.5–16)
GLUCOSE BLD-MCNC: 108 MG/DL (ref 70–99)
HCT VFR BLD AUTO: 41.2 % (ref 37–53)
HGB BLD-MCNC: 14.3 G/DL (ref 13–17)
IMMATURE GRANULOCYTE COUNT: 0.02 X10(3) UL (ref 0–1)
IMMATURE GRANULOCYTE RATIO %: 0.3 %
LYMPHOCYTES # BLD AUTO: 1.84 X10(3) UL (ref 0.9–4)
LYMPHOCYTES NFR BLD AUTO: 24.9 %
MCH RBC QN AUTO: 31.2 PG (ref 27–33.2)
MCHC RBC AUTO-ENTMCNC: 34.7 G/DL (ref 31–37)
MCV RBC AUTO: 90 FL (ref 80–99)
MONOCYTES # BLD AUTO: 0.62 X10(3) UL (ref 0.1–0.6)
MONOCYTES NFR BLD AUTO: 8.4 %
NEUTROPHIL ABS PRELIM: 4.7 X10 (3) UL (ref 1.3–6.7)
NEUTROPHILS # BLD AUTO: 4.7 X10(3) UL (ref 1.3–6.7)
NEUTROPHILS NFR BLD AUTO: 63.7 %
P2Y12 REACTION UNITS: 144 PRU
PLATELET # BLD AUTO: 155 10(3)UL (ref 150–450)
POTASSIUM SERPL-SCNC: 3.8 MMOL/L (ref 3.6–5.1)
RBC # BLD AUTO: 4.58 X10(6)UL (ref 3.8–5.8)
RED CELL DISTRIBUTION WIDTH-SD: 41.9 FL (ref 35.1–46.3)
SODIUM SERPL-SCNC: 142 MMOL/L (ref 136–144)
TROPONIN: <0.046 NG/ML (ref ?–0.05)
WBC # BLD AUTO: 7.4 X10(3) UL (ref 4–13)

## 2017-08-25 PROCEDURE — 99232 SBSQ HOSP IP/OBS MODERATE 35: CPT | Performed by: HOSPITALIST

## 2017-08-25 RX ORDER — POTASSIUM CHLORIDE 20 MEQ/1
40 TABLET, EXTENDED RELEASE ORAL ONCE
Status: COMPLETED | OUTPATIENT
Start: 2017-08-25 | End: 2017-08-25

## 2017-08-25 NOTE — PROGRESS NOTES
[facility]  Progress Note     Flip Cisneros Patient Status:  Inpatient    1940 MRN [id number]   [facility] 8NE-A Attending Emily Stubbs MD   Hosp Day # 2 PCP Cony Archibald MD     Chief Complaint:   Chest pain x1 w breakfast   • Pravastatin Sodium  20 mg Oral Nightly   • metoprolol Tartrate  25 mg Oral 2x Daily(Beta Blocker)       ASSESSMENT / PLAN:     1. NSTEMI- disease LAD     1. Cards following/[facility] following  2. CABg Monday   3. Heparin drip  4.  ASA allergy- will

## 2017-08-25 NOTE — CM/SW NOTE
Pt is a 64 y/o male, admitted for chest pain. Per pt's RN, pt will have open heart sx w/ Dr. Rebel Macias on Monday 8/28. SW self-referred to meet w/ pt due to case finding and diagnosis. Pt's wife was also present during the assessment.      Pt live w/ his supporti

## 2017-08-25 NOTE — PROGRESS NOTES
CARDIOVASCULAR - ADULT     • Maintains optimal cardiac output and hemodynamic stability Not Progressing     • Absence of cardiac arrhythmias or at baseline Not Progressing           PAIN - ADULT     • Verbalizes/displays adequate comfort level or patient's

## 2017-08-25 NOTE — PROGRESS NOTES
· [facility] Progress Note     Subjective:  Just out of shower - no CP    Objective:  112/54  Afebrile  SB/Sr  I/O equal     Troponin normal.  PTT 63  BUN/cr 18/0.90    Neuro:awake/alert  HEENT:no JVD  Cardiac:S1 S2 regular  Lungs: clear  Abdo

## 2017-08-26 LAB
APTT PPP: 62.8 SECONDS (ref 25–34)
BILIRUB UR QL STRIP.AUTO: NEGATIVE
COLOR UR AUTO: YELLOW
GLUCOSE UR STRIP.AUTO-MCNC: NEGATIVE MG/DL
KETONES UR STRIP.AUTO-MCNC: NEGATIVE MG/DL
NITRITE UR QL STRIP.AUTO: POSITIVE
P2Y12 REACTION UNITS: 176 PRU
PH UR STRIP.AUTO: 6 [PH] (ref 4.5–8)
POTASSIUM SERPL-SCNC: 4.3 MMOL/L (ref 3.6–5.1)
PROT UR STRIP.AUTO-MCNC: NEGATIVE MG/DL
RBC UR QL AUTO: NEGATIVE
SP GR UR STRIP.AUTO: 1.02 (ref 1–1.03)
UROBILINOGEN UR STRIP.AUTO-MCNC: <2 MG/DL
WBC #/AREA URNS AUTO: >50 /HPF
WBC CLUMPS UR QL AUTO: PRESENT

## 2017-08-26 PROCEDURE — 99232 SBSQ HOSP IP/OBS MODERATE 35: CPT | Performed by: HOSPITALIST

## 2017-08-26 NOTE — PROGRESS NOTES
· [facility] Progress Note     Subjective:  No new issues.   Seen by Dr. Bradley Cristina - CABG Monday    Objective:  125/64  Afebrile  SB/SR  I/O equal     Troponin normal.  PTT 62.8    Neuro:awake/alert  HEENT:no JVD  Cardiac:S1 S2 regular  Lungs: clear

## 2017-08-26 NOTE — PROGRESS NOTES
[facility]  Progress Note     Samantha Mohr Patient Status:  Inpatient    1940 MRN [id number]   [facility] 8NE-A Attending Dinesh Lamb MD   Hosp Day # 3 PCP Lucero Genao MD     Chief Complaint:   Chest pain x1 w reviewed in Epic. MICRO:   Medications:   • Alfuzosin HCl ER  10 mg Oral Daily with breakfast   • Pravastatin Sodium  20 mg Oral Nightly   • metoprolol Tartrate  25 mg Oral 2x Daily(Beta Blocker)       ASSESSMENT / PLAN:     1. NSTEMI- diseased LAD     1.

## 2017-08-26 NOTE — PLAN OF CARE
Alert. Oriented. sr per tele. Denies any pain, sob. Up ad rochelle. Heparin gtt infusing. Pre-op teaching initiated for cabg on Monday. Need reinforcement. poc updated w/ patient and wife. Cont. to monitor pt.

## 2017-08-26 NOTE — PLAN OF CARE
CARDIOVASCULAR - ADULT    • Maintains optimal cardiac output and hemodynamic stability Progressing    • Absence of cardiac arrhythmias or at baseline Progressing        DISCHARGE PLANNING - CASE MANAGEMENT    • Discharge to post-acute care or home with juan daniel

## 2017-08-27 ENCOUNTER — ANESTHESIA EVENT (OUTPATIENT)
Dept: CARDIAC SURGERY | Facility: HOSPITAL | Age: 77
DRG: 234 | End: 2017-08-27
Payer: COMMERCIAL

## 2017-08-27 LAB
ANTIBODY SCREEN: NEGATIVE
APTT PPP: 62.2 SECONDS (ref 25–34)
ATRIAL RATE: 63 BPM
P AXIS: 24 DEGREES
P-R INTERVAL: 154 MS
Q-T INTERVAL: 440 MS
QRS DURATION: 122 MS
QTC CALCULATION (BEZET): 450 MS
R AXIS: -27 DEGREES
RH BLOOD TYPE: POSITIVE
T AXIS: 43 DEGREES
VENTRICULAR RATE: 63 BPM

## 2017-08-27 PROCEDURE — 99232 SBSQ HOSP IP/OBS MODERATE 35: CPT | Performed by: HOSPITALIST

## 2017-08-27 RX ORDER — SODIUM CHLORIDE 9 MG/ML
INJECTION, SOLUTION INTRAVENOUS CONTINUOUS
Status: DISCONTINUED | OUTPATIENT
Start: 2017-08-28 | End: 2017-08-28

## 2017-08-27 RX ORDER — SODIUM CHLORIDE 9 MG/ML
INJECTION, SOLUTION INTRAVENOUS CONTINUOUS
Status: DISCONTINUED | OUTPATIENT
Start: 2017-08-27 | End: 2017-08-27

## 2017-08-27 NOTE — ANESTHESIA PREPROCEDURE EVALUATION
PRE-OP EVALUATION    Patient Name: Mello Campbell    Pre-op Diagnosis: CORONARY ARTERY DIEASE      Procedure(s):   CORONARY ARTERY BYPASS GRAFTING    Surgeon(s) and Role:     * Gwenette Curling, MD - Primary    Pre-op vitals reviewed.   Temp: 97.8 °F (3 [COMPLETED] Heparin Sodium (Porcine) 5000 UNIT/ML injection 5,000 Units 60 Units/kg Intravenous Once   [COMPLETED] heparin (PORCINE) 77790apkwk/250mL infusion ED INITIAL DOSE 12 Units/kg/hr Intravenous Once   heparin (PORCINE) drip 49080hrwex/250mL infus estimated ejection fraction was 60-65%.     No diagnostic evidence for regional wall motion abnormalities. Doppler     parameters are consistent with abnormal left ventricular relaxation -     grade 1 diastolic dysfunction.   2. Aortic valve: Trileaflet; mi bilaterally. Other findings            ASA: 4   Plan: general  NPO status verified and patient meets guidelines. Patient has taken beta blockers in last 24 hours. Post-procedure pain management plan discussed with surgeon and patient.     Co

## 2017-08-27 NOTE — PLAN OF CARE
Alert. Oriented. sr per tele. Denies cp, sob. Up ad rochelle. Heparin gtt infusing. Pre-op teaching done. Incentive spirometry up to 2000. Pt for cabg Monday. poc updated w/ patient. Cont. to monitor pt

## 2017-08-27 NOTE — PROGRESS NOTES
[facility] Progress Note    Patient seen and examined.  Chart reviewed.      No chest pain or shortness of breath.   Doing well and ready for surgery tomorrow    /61 (BP Location: Left arm)   Pulse 59   Temp 97.7 °F (36.5 °C) (Oral)   Resp 20   Ht

## 2017-08-27 NOTE — PROGRESS NOTES
[facility]  Progress Note     Read Gain Patient Status:  Inpatient    1940 MRN [id number]   [facility] 8NE-A Attending Suzan Carrillo MD   Hosp Day # 4 PCP Lai Valdez MD     Chief Complaint:   Chest pain x1 w preserved EF per cards    2. Essential hypertension- on BB, controlled  3. Dyslipidemia, statin  4.  PreDM A1c 6.0      Quality:  · DVT Prophylaxis: heparin gtt  · CODE status: full  · Partida: no  · Central line: no      Estimated date of discharge:  LORENZO Rendon

## 2017-08-28 ENCOUNTER — SURGERY (OUTPATIENT)
Age: 77
End: 2017-08-28

## 2017-08-28 ENCOUNTER — APPOINTMENT (OUTPATIENT)
Dept: GENERAL RADIOLOGY | Facility: HOSPITAL | Age: 77
DRG: 234 | End: 2017-08-28
Attending: PHYSICIAN ASSISTANT
Payer: COMMERCIAL

## 2017-08-28 ENCOUNTER — ANESTHESIA (OUTPATIENT)
Dept: CARDIAC SURGERY | Facility: HOSPITAL | Age: 77
DRG: 234 | End: 2017-08-28
Payer: COMMERCIAL

## 2017-08-28 LAB
APTT PPP: 29.2 SECONDS (ref 25–34)
APTT PPP: 30.4 SECONDS (ref 25–34)
APTT PPP: 66.1 SECONDS (ref 25–34)
ARTERIAL BLD GAS O2 SATURATION: 95 % (ref 92–100)
ARTERIAL BLD GAS O2 SATURATION: 96 % (ref 92–100)
ARTERIAL BLOOD GAS BASE EXCESS: -2.1
ARTERIAL BLOOD GAS BASE EXCESS: -4.7
ARTERIAL BLOOD GAS HCO3: 20 MEQ/L (ref 22–26)
ARTERIAL BLOOD GAS HCO3: 22.6 MEQ/L (ref 22–26)
ARTERIAL BLOOD GAS PCO2: 35 MM HG (ref 35–45)
ARTERIAL BLOOD GAS PCO2: 38 MM HG (ref 35–45)
ARTERIAL BLOOD GAS PH: 7.37 (ref 7.35–7.45)
ARTERIAL BLOOD GAS PH: 7.39 (ref 7.35–7.45)
ARTERIAL BLOOD GAS PO2: 86 MM HG (ref 80–105)
ARTERIAL BLOOD GAS PO2: 90 MM HG (ref 80–105)
ATRIAL RATE: 65 BPM
BLOOD TYPE BARCODE: 9500
BUN BLD-MCNC: 22 MG/DL (ref 8–20)
CALCIUM BLD-MCNC: 10.3 MG/DL (ref 8.3–10.3)
CALCULATED O2 SATURATION: 96 % (ref 92–100)
CALCULATED O2 SATURATION: 97 % (ref 92–100)
CARBOXYHEMOGLOBIN: 1.2 % SAT (ref 0–3)
CARBOXYHEMOGLOBIN: 1.2 % SAT (ref 0–3)
CHLORIDE: 112 MMOL/L (ref 101–111)
CO2: 25 MMOL/L (ref 22–32)
CREAT BLD-MCNC: 1.1 MG/DL (ref 0.7–1.3)
ERYTHROCYTE [DISTWIDTH] IN BLOOD BY AUTOMATED COUNT: 12.7 % (ref 11.5–16)
FIBRINOGEN: 201 MG/DL (ref 200–446)
FIBRINOGEN: 308 MG/DL (ref 200–446)
FIO2: 40 %
FIO2: 50 %
GLUCOSE BLD-MCNC: 121 MG/DL (ref 65–99)
GLUCOSE BLD-MCNC: 125 MG/DL (ref 65–99)
GLUCOSE BLD-MCNC: 128 MG/DL (ref 65–99)
GLUCOSE BLD-MCNC: 129 MG/DL (ref 65–99)
GLUCOSE BLD-MCNC: 134 MG/DL (ref 65–99)
GLUCOSE BLD-MCNC: 137 MG/DL (ref 70–99)
GLUCOSE BLD-MCNC: 138 MG/DL (ref 65–99)
GLUCOSE BLD-MCNC: 140 MG/DL (ref 65–99)
GLUCOSE BLD-MCNC: 150 MG/DL (ref 65–99)
GLUCOSE BLD-MCNC: 154 MG/DL (ref 65–99)
GLUCOSE BLD-MCNC: 157 MG/DL (ref 65–99)
GLUCOSE BLD-MCNC: 158 MG/DL (ref 65–99)
GLUCOSE BLD-MCNC: 160 MG/DL (ref 65–99)
GLUCOSE BLD-MCNC: 98 MG/DL (ref 65–99)
HAV IGM SER QL: 2.8 MG/DL (ref 1.7–3)
HCT VFR BLD AUTO: 43.8 % (ref 37–53)
HGB BLD-MCNC: 15 G/DL (ref 13–17)
INR BLD: 1.22 (ref 0.89–1.11)
INR BLD: 1.62 (ref 0.89–1.11)
ISTAT ACTIVATED CLOTTING TIME: 103 SECONDS (ref 74–137)
ISTAT BLOOD GAS BASE DEFICIT: -3 MMOL/L
ISTAT BLOOD GAS HCO3: 22.5 MEQ/L (ref 22–26)
ISTAT BLOOD GAS O2 SATURATION: 96 % (ref 92–100)
ISTAT BLOOD GAS PCO2: 39 MMHG (ref 35–45)
ISTAT BLOOD GAS PH: 7.37 (ref 7.35–7.45)
ISTAT BLOOD GAS PO2: 85 MMHG (ref 80–105)
ISTAT BLOOD GAS TCO2: 24 MMOL/L (ref 22–32)
ISTAT HEMATOCRIT: 42 % (ref 37–54)
ISTAT IONIZED CALCIUM: 1.54 MMOL/L (ref 1.12–1.32)
ISTAT POTASSIUM: 4.2 MMOL/L (ref 3.6–5.1)
ISTAT SODIUM: 141 MMOL/L (ref 136–144)
MCH RBC QN AUTO: 30.9 PG (ref 27–33.2)
MCHC RBC AUTO-ENTMCNC: 34.2 G/DL (ref 31–37)
MCV RBC AUTO: 90.3 FL (ref 80–99)
METHEMOGLOBIN: 0.6 % SAT (ref 0.4–1.5)
METHEMOGLOBIN: 0.6 % SAT (ref 0.4–1.5)
P AXIS: -1 DEGREES
P-R INTERVAL: 160 MS
PATIENT TEMPERATURE: 97.7 F
PATIENT TEMPERATURE: 97.9 F
PEEP: 5 CM H2O
PEEP: 5 CM H2O
PLATELET # BLD AUTO: 61 10(3)UL (ref 150–450)
PLATELET # BLD AUTO: 89 10(3)UL (ref 150–450)
POTASSIUM SERPL-SCNC: 4.3 MMOL/L (ref 3.6–5.1)
PRESSURE SUPPORT: 5 CM H2O
PSA SERPL DL<=0.01 NG/ML-MCNC: 15.5 SECONDS (ref 12–14.3)
PSA SERPL DL<=0.01 NG/ML-MCNC: 19.4 SECONDS (ref 12–14.3)
Q-T INTERVAL: 436 MS
QRS DURATION: 126 MS
QTC CALCULATION (BEZET): 453 MS
R AXIS: -40 DEGREES
RBC # BLD AUTO: 4.85 X10(6)UL (ref 3.8–5.8)
RED CELL DISTRIBUTION WIDTH-SD: 42.1 FL (ref 35.1–46.3)
SODIUM SERPL-SCNC: 142 MMOL/L (ref 136–144)
T AXIS: 57 DEGREES
TIDAL VOLUME: 550 ML
TOTAL HEMOGLOBIN: 13.8 G/DL (ref 12.6–17.4)
TOTAL HEMOGLOBIN: 15.1 G/DL (ref 12.6–17.4)
VENT RATE: 12 /MIN
VENTRICULAR RATE: 65 BPM
WBC # BLD AUTO: 15.4 X10(3) UL (ref 4–13)

## 2017-08-28 PROCEDURE — 0210099 BYPASS CORONARY ARTERY, ONE ARTERY FROM LEFT INTERNAL MAMMARY WITH AUTOLOGOUS VENOUS TISSUE, OPEN APPROACH: ICD-10-PCS | Performed by: THORACIC SURGERY (CARDIOTHORACIC VASCULAR SURGERY)

## 2017-08-28 PROCEDURE — 5A1221Z PERFORMANCE OF CARDIAC OUTPUT, CONTINUOUS: ICD-10-PCS | Performed by: THORACIC SURGERY (CARDIOTHORACIC VASCULAR SURGERY)

## 2017-08-28 PROCEDURE — 99233 SBSQ HOSP IP/OBS HIGH 50: CPT | Performed by: HOSPITALIST

## 2017-08-28 PROCEDURE — 06BQ4ZZ EXCISION OF LEFT SAPHENOUS VEIN, PERCUTANEOUS ENDOSCOPIC APPROACH: ICD-10-PCS | Performed by: THORACIC SURGERY (CARDIOTHORACIC VASCULAR SURGERY)

## 2017-08-28 PROCEDURE — 71010 XR CHEST AP PORTABLE  (CPT=71010): CPT | Performed by: PHYSICIAN ASSISTANT

## 2017-08-28 PROCEDURE — B24BZZ4 ULTRASONOGRAPHY OF HEART WITH AORTA, TRANSESOPHAGEAL: ICD-10-PCS | Performed by: THORACIC SURGERY (CARDIOTHORACIC VASCULAR SURGERY)

## 2017-08-28 PROCEDURE — 021009W BYPASS CORONARY ARTERY, ONE ARTERY FROM AORTA WITH AUTOLOGOUS VENOUS TISSUE, OPEN APPROACH: ICD-10-PCS | Performed by: THORACIC SURGERY (CARDIOTHORACIC VASCULAR SURGERY)

## 2017-08-28 RX ORDER — MIDAZOLAM HYDROCHLORIDE 1 MG/ML
1 INJECTION INTRAMUSCULAR; INTRAVENOUS EVERY 30 MIN PRN
Status: DISCONTINUED | OUTPATIENT
Start: 2017-08-28 | End: 2017-08-29

## 2017-08-28 RX ORDER — POLYETHYLENE GLYCOL 3350 17 G/17G
1 POWDER, FOR SOLUTION ORAL DAILY PRN
Status: DISCONTINUED | OUTPATIENT
Start: 2017-08-28 | End: 2017-09-03

## 2017-08-28 RX ORDER — DOBUTAMINE HYDROCHLORIDE 200 MG/100ML
INJECTION INTRAVENOUS CONTINUOUS PRN
Status: DISCONTINUED | OUTPATIENT
Start: 2017-08-28 | End: 2017-08-29

## 2017-08-28 RX ORDER — POTASSIUM CHLORIDE 14.9 MG/ML
20 INJECTION INTRAVENOUS AS NEEDED
Status: DISCONTINUED | OUTPATIENT
Start: 2017-08-28 | End: 2017-08-29

## 2017-08-28 RX ORDER — HYDROCODONE BITARTRATE AND ACETAMINOPHEN 10; 325 MG/1; MG/1
2 TABLET ORAL EVERY 4 HOURS PRN
Status: DISCONTINUED | OUTPATIENT
Start: 2017-08-28 | End: 2017-08-29

## 2017-08-28 RX ORDER — ONDANSETRON 2 MG/ML
4 INJECTION INTRAMUSCULAR; INTRAVENOUS EVERY 6 HOURS PRN
Status: DISCONTINUED | OUTPATIENT
Start: 2017-08-28 | End: 2017-09-03

## 2017-08-28 RX ORDER — HYDROCODONE BITARTRATE AND ACETAMINOPHEN 10; 325 MG/1; MG/1
1 TABLET ORAL EVERY 4 HOURS PRN
Status: DISCONTINUED | OUTPATIENT
Start: 2017-08-28 | End: 2017-08-29

## 2017-08-28 RX ORDER — MORPHINE SULFATE 4 MG/ML
4 INJECTION, SOLUTION INTRAMUSCULAR; INTRAVENOUS
Status: DISCONTINUED | OUTPATIENT
Start: 2017-08-28 | End: 2017-08-29

## 2017-08-28 RX ORDER — FAMOTIDINE 20 MG/1
20 TABLET ORAL 2 TIMES DAILY
Status: DISCONTINUED | OUTPATIENT
Start: 2017-08-28 | End: 2017-08-30

## 2017-08-28 RX ORDER — ALBUMIN, HUMAN INJ 5% 5 %
250 SOLUTION INTRAVENOUS ONCE
Status: COMPLETED | OUTPATIENT
Start: 2017-08-28 | End: 2017-08-28

## 2017-08-28 RX ORDER — MORPHINE SULFATE 4 MG/ML
8 INJECTION, SOLUTION INTRAMUSCULAR; INTRAVENOUS
Status: DISCONTINUED | OUTPATIENT
Start: 2017-08-28 | End: 2017-08-29

## 2017-08-28 RX ORDER — ALBUMIN, HUMAN INJ 5% 5 %
SOLUTION INTRAVENOUS
Status: COMPLETED
Start: 2017-08-28 | End: 2017-08-28

## 2017-08-28 RX ORDER — FAMOTIDINE 10 MG/ML
20 INJECTION, SOLUTION INTRAVENOUS 2 TIMES DAILY
Status: DISCONTINUED | OUTPATIENT
Start: 2017-08-28 | End: 2017-08-30

## 2017-08-28 RX ORDER — SODIUM CHLORIDE 9 MG/ML
INJECTION, SOLUTION INTRAVENOUS CONTINUOUS
Status: DISCONTINUED | OUTPATIENT
Start: 2017-08-28 | End: 2017-09-01

## 2017-08-28 RX ORDER — ALBUMIN, HUMAN INJ 5% 5 %
250 SOLUTION INTRAVENOUS ONCE AS NEEDED
Status: COMPLETED | OUTPATIENT
Start: 2017-08-28 | End: 2017-08-28

## 2017-08-28 RX ORDER — DEXTROSE AND SODIUM CHLORIDE 5; .45 G/100ML; G/100ML
INJECTION, SOLUTION INTRAVENOUS CONTINUOUS
Status: ACTIVE | OUTPATIENT
Start: 2017-08-28 | End: 2017-08-29

## 2017-08-28 RX ORDER — DEXMEDETOMIDINE HYDROCHLORIDE 4 UG/ML
INJECTION, SOLUTION INTRAVENOUS CONTINUOUS
Status: DISCONTINUED | OUTPATIENT
Start: 2017-08-28 | End: 2017-08-29

## 2017-08-28 RX ORDER — MORPHINE SULFATE 4 MG/ML
2 INJECTION, SOLUTION INTRAMUSCULAR; INTRAVENOUS
Status: DISCONTINUED | OUTPATIENT
Start: 2017-08-28 | End: 2017-08-29

## 2017-08-28 RX ORDER — BISACODYL 10 MG
10 SUPPOSITORY, RECTAL RECTAL
Status: DISCONTINUED | OUTPATIENT
Start: 2017-08-28 | End: 2017-09-03

## 2017-08-28 RX ORDER — CHLORHEXIDINE GLUCONATE 0.12 MG/ML
15 RINSE ORAL
Status: DISCONTINUED | OUTPATIENT
Start: 2017-08-28 | End: 2017-08-29

## 2017-08-28 RX ORDER — IPRATROPIUM BROMIDE AND ALBUTEROL SULFATE 2.5; .5 MG/3ML; MG/3ML
3 SOLUTION RESPIRATORY (INHALATION) EVERY 4 HOURS PRN
Status: DISCONTINUED | OUTPATIENT
Start: 2017-08-28 | End: 2017-09-03

## 2017-08-28 RX ORDER — CEFAZOLIN SODIUM 1 G/3ML
INJECTION, POWDER, FOR SOLUTION INTRAMUSCULAR; INTRAVENOUS
Status: DISCONTINUED | OUTPATIENT
Start: 2017-08-28 | End: 2017-08-28 | Stop reason: HOSPADM

## 2017-08-28 RX ORDER — TEMAZEPAM 15 MG/1
15 CAPSULE ORAL NIGHTLY PRN
Status: DISCONTINUED | OUTPATIENT
Start: 2017-08-28 | End: 2017-09-03

## 2017-08-28 RX ORDER — POTASSIUM CHLORIDE 29.8 MG/ML
40 INJECTION INTRAVENOUS AS NEEDED
Status: DISCONTINUED | OUTPATIENT
Start: 2017-08-28 | End: 2017-08-29

## 2017-08-28 RX ORDER — NITROGLYCERIN 20 MG/100ML
INJECTION INTRAVENOUS CONTINUOUS PRN
Status: DISCONTINUED | OUTPATIENT
Start: 2017-08-28 | End: 2017-08-29

## 2017-08-28 RX ORDER — DEXTROSE AND SODIUM CHLORIDE 5; .45 G/100ML; G/100ML
INJECTION, SOLUTION INTRAVENOUS CONTINUOUS
Status: ACTIVE | OUTPATIENT
Start: 2017-08-28 | End: 2017-08-28

## 2017-08-28 RX ORDER — DOCUSATE SODIUM 100 MG/1
100 CAPSULE, LIQUID FILLED ORAL 2 TIMES DAILY
Status: DISCONTINUED | OUTPATIENT
Start: 2017-08-28 | End: 2017-09-03

## 2017-08-28 RX ORDER — DEXTROSE MONOHYDRATE 25 G/50ML
50 INJECTION, SOLUTION INTRAVENOUS
Status: DISCONTINUED | OUTPATIENT
Start: 2017-08-28 | End: 2017-09-03

## 2017-08-28 RX ORDER — SODIUM CHLORIDE 9 MG/ML
INJECTION, SOLUTION INTRAVENOUS CONTINUOUS
Status: DISCONTINUED | OUTPATIENT
Start: 2017-08-28 | End: 2017-08-28

## 2017-08-28 NOTE — ANESTHESIA POSTPROCEDURE EVALUATION
[address]  Patient Status:  Inpatient   Age/Gender 65year old male MRN [id number]   [facility] 6NE-A Attending Rosa Low MD   [facility] Day # 5 PCP Judy Cristina MD       Anesthesia Post-op Note    Procedure(

## 2017-08-28 NOTE — PLAN OF CARE
Alert. Oriented. sr per tele. For cabg today. hibiclens shower/scrub done. Will do one again this morning. Denies pain. Incentive spirometry up to 2250. Pre-op/post-op teaching reinforced. Pt's wife at bedside. Updated w/ poc. Cont. to monitor pt.

## 2017-08-28 NOTE — PLAN OF CARE
Received pt from [facility] s/p CABG x2 at 36. Pt intubated and sedated on precedex. VSS. NSR. Levophed and dobutamine infusing to keep SBP >90. Albumin x1 per order. CT intact to suction. No air leak noted. Minimal sanguinous output. Dressing C/D/I.  V wires c

## 2017-08-28 NOTE — PROGRESS NOTES
[facility]  Progress Note     Lizzie Lio Patient Status:  Inpatient    1940 MRN [id number]   [facility] 8NE-A Attending Rosa Low MD   Hosp Day # 5 PCP Rojelio Braden MD     Chief Complaint:   Chest pain x1 w BID   • famoTIDine  20 mg Oral BID    Or   • famoTIDine  20 mg Intravenous BID   • vancomycin  15 mg/kg Intravenous Q12H   • mupirocin  1 Application Nasal BID   • ceFAZolin  2 g Intravenous Q8H   • Alfuzosin HCl ER  10 mg Oral Daily with breakfast   • Pra

## 2017-08-28 NOTE — PROGRESS NOTES
[facility]  Progress Note    [place] Patient Status:  Inpatient    1940 MRN [id number]   [facility] 6NE-A Attending Ronald Chin MD   Hosp Day # 5 PCP Aidee Ireland MD     Subjective: intubated, sedated, wife a --   2.8   GLU   --   137*         Imaging:  Us Carotid Doppler Bilat - Diag Img (cpt=93880)    Result Date: 8/24/2017  PROCEDURE:  US CAROTID DOPPLER BILAT - DIAG IMG (CPT=93880)  COMPARISON:  [facility] BRAIN W WO CONTRAST MRI, 7/17/2004, 12:04.   INDICAT (CPT=71010), 8/23/2017, 17:34. INDICATIONS:  s/p open heart  PATIENT STATED HISTORY: (As transcribed by Technologist)     FINDINGS:  Interval sternotomy changes. Lung volumes are small.  Patchy air space opacities are present, mostly medial lung bases bila solution 3 mL 3 mL Nebulization Q4H PRN   Insulin Regular Human (NOVOLIN R) 100 Units in sodium chloride 0.9 % 100 mL infusion 1-40 Units/hr Intravenous Continuous   EPINEPHrine HCl (ADRENALIN) 4 mg in sodium chloride 0.9 % 250 mL infusion 1-10 mcg/min Int premix 20 mEq 20 mEq Intravenous PRN   Or      potassium chloride IVPB premix 40 mEq 40 mEq Intravenous PRN   calcium gluconate 3 g in sodium chloride 0.9 % 100 mL IVPB 3 g Intravenous PRN   magnesium sulfate IVPB premix 1 g 1 g Intravenous PRN   magnesium when able to swallow  - follow labs and CXR  - possible extubation today    D/w pt's wife and CCU Nurse    Kathy Singh M.D., F.A.C.C.   Advanced Heart Failure  [facility]    8/28/2017  12:59 PM

## 2017-08-28 NOTE — PROGRESS NOTES
Anesthesia Ventilator Management    Patient is s/p CABG 2V  POD#0. Patient is currently sedated and intubated. Vent settings: PRVC 550/12/60%/5  ABG, CXR pending    Dex @ 0.3  Levo @ 2    Will wean FiO2 as tolerated.   Plan for extubation after CPAP trial

## 2017-08-28 NOTE — DIETARY NOTE
Nutrition Short Note    Dietitian consult received per cardiac rehab protocol. Pt to be educated by cardiac rehab staff and encouraged to attend outpatient classes taught by RD. GRAHAM available PRN.     Sascha Maxwell RD, LDN

## 2017-08-28 NOTE — PROGRESS NOTES
08/28/17 1425   Clinical Encounter Type   Visited With Family   Routine Visit Introduction   Continue Visiting No  (Empowered to call  anytime for continuity of care/support)   Patient's Supportive Strategies/Resources Provided emotional support

## 2017-08-28 NOTE — PAYOR COMM NOTE
--------------  CONTINUED STAY REVIEW    Payor: [facility]  Subscriber #:  [id number]  Authorization Number: [id number]    Admit date: 8/23/17  Admit time: 1955    Admitting Physician: Justo Sena MD  Attending Physician:  Karen Brown MD Route User    8/28/2017 1145 New Bag 100 mL/hr Intravenous Deven Floyd RN      DOBUTamine in D5W (DOBUTREX) 500 mg/250 ml infusion     Date Action Dose Route User    8/28/2017 1220 Rate/Dose Change 2 mcg/kg/min × 84.5 kg Intravenous Deven Floyd RN (none) Intravenous Alex Williamson RN      0.9%  NaCl infusion     Date Action Dose Route User    8/28/2017 1145 New Bag 10 mL/hr Intravenous Jose Francisco Richards RN      thrombin (THROMBIN-I) 79430 units powder     Date Action Dose Route User    8/28

## 2017-08-29 ENCOUNTER — APPOINTMENT (OUTPATIENT)
Dept: GENERAL RADIOLOGY | Facility: HOSPITAL | Age: 77
DRG: 234 | End: 2017-08-29
Attending: PHYSICIAN ASSISTANT
Payer: COMMERCIAL

## 2017-08-29 ENCOUNTER — APPOINTMENT (OUTPATIENT)
Dept: GENERAL RADIOLOGY | Facility: HOSPITAL | Age: 77
DRG: 234 | End: 2017-08-29
Attending: THORACIC SURGERY (CARDIOTHORACIC VASCULAR SURGERY)
Payer: COMMERCIAL

## 2017-08-29 LAB
ATRIAL RATE: 88 BPM
BASOPHILS # BLD AUTO: 0.02 X10(3) UL (ref 0–0.1)
BASOPHILS NFR BLD AUTO: 0.1 %
BUN BLD-MCNC: 25 MG/DL (ref 8–20)
CALCIUM BLD-MCNC: 8.4 MG/DL (ref 8.3–10.3)
CHLORIDE: 115 MMOL/L (ref 101–111)
CO2: 22 MMOL/L (ref 22–32)
CREAT BLD-MCNC: 1.48 MG/DL (ref 0.7–1.3)
EOSINOPHIL # BLD AUTO: 0 X10(3) UL (ref 0–0.3)
EOSINOPHIL NFR BLD AUTO: 0 %
ERYTHROCYTE [DISTWIDTH] IN BLOOD BY AUTOMATED COUNT: 13.2 % (ref 11.5–16)
GLUCOSE BLD-MCNC: 109 MG/DL (ref 65–99)
GLUCOSE BLD-MCNC: 116 MG/DL (ref 65–99)
GLUCOSE BLD-MCNC: 118 MG/DL (ref 65–99)
GLUCOSE BLD-MCNC: 119 MG/DL (ref 70–99)
GLUCOSE BLD-MCNC: 123 MG/DL (ref 65–99)
GLUCOSE BLD-MCNC: 126 MG/DL (ref 65–99)
GLUCOSE BLD-MCNC: 126 MG/DL (ref 65–99)
GLUCOSE BLD-MCNC: 128 MG/DL (ref 65–99)
GLUCOSE BLD-MCNC: 130 MG/DL (ref 65–99)
GLUCOSE BLD-MCNC: 131 MG/DL (ref 65–99)
GLUCOSE BLD-MCNC: 141 MG/DL (ref 65–99)
GLUCOSE BLD-MCNC: 168 MG/DL (ref 65–99)
GLUCOSE BLD-MCNC: 234 MG/DL (ref 65–99)
GLUCOSE BLD-MCNC: 243 MG/DL (ref 65–99)
HAV IGM SER QL: 2 MG/DL (ref 1.7–3)
HCT VFR BLD AUTO: 37.2 % (ref 37–53)
HGB BLD-MCNC: 12.1 G/DL (ref 13–17)
IMMATURE GRANULOCYTE COUNT: 0.12 X10(3) UL (ref 0–1)
IMMATURE GRANULOCYTE RATIO %: 0.6 %
LYMPHOCYTES # BLD AUTO: 1.18 X10(3) UL (ref 0.9–4)
LYMPHOCYTES NFR BLD AUTO: 5.8 %
MCH RBC QN AUTO: 30 PG (ref 27–33.2)
MCHC RBC AUTO-ENTMCNC: 32.5 G/DL (ref 31–37)
MCV RBC AUTO: 92.3 FL (ref 80–99)
MONOCYTES # BLD AUTO: 1.81 X10(3) UL (ref 0.1–0.6)
MONOCYTES NFR BLD AUTO: 8.9 %
NEUTROPHIL ABS PRELIM: 17.12 X10 (3) UL (ref 1.3–6.7)
NEUTROPHILS # BLD AUTO: 17.12 X10(3) UL (ref 1.3–6.7)
NEUTROPHILS NFR BLD AUTO: 84.6 %
P AXIS: 49 DEGREES
P-R INTERVAL: 154 MS
PLATELET # BLD AUTO: 86 10(3)UL (ref 150–450)
POTASSIUM SERPL-SCNC: 4.7 MMOL/L (ref 3.6–5.1)
Q-T INTERVAL: 374 MS
QRS DURATION: 98 MS
QTC CALCULATION (BEZET): 452 MS
R AXIS: -20 DEGREES
RBC # BLD AUTO: 4.03 X10(6)UL (ref 3.8–5.8)
RED CELL DISTRIBUTION WIDTH-SD: 44.5 FL (ref 35.1–46.3)
SODIUM SERPL-SCNC: 147 MMOL/L (ref 136–144)
T AXIS: 39 DEGREES
VENTRICULAR RATE: 88 BPM
WBC # BLD AUTO: 20.3 X10(3) UL (ref 4–13)

## 2017-08-29 PROCEDURE — 71010 XR CHEST AP PORTABLE  (CPT=71010): CPT | Performed by: THORACIC SURGERY (CARDIOTHORACIC VASCULAR SURGERY)

## 2017-08-29 PROCEDURE — 71010 XR CHEST AP PORTABLE  (CPT=71010): CPT | Performed by: PHYSICIAN ASSISTANT

## 2017-08-29 PROCEDURE — 99233 SBSQ HOSP IP/OBS HIGH 50: CPT | Performed by: HOSPITALIST

## 2017-08-29 RX ORDER — ACETAMINOPHEN 500 MG
500 TABLET ORAL EVERY 4 HOURS PRN
Status: DISCONTINUED | OUTPATIENT
Start: 2017-08-29 | End: 2017-09-03

## 2017-08-29 NOTE — OCCUPATIONAL THERAPY NOTE
OCCUPATIONAL THERAPY EVALUATION - INPATIENT     Room Number: 6351/0810-F  Evaluation Date: 8/29/2017  Type of Evaluation: Initial  Presenting Problem: CABG    Physician Order: IP Consult to Occupational Therapy  Reason for Therapy: ADL/IADL Dysfunction and touch:  intact    Communication: Pt is able to communicate all basic needs and wants    Behavioral/Emotional/Social: Pt was participated in and was motivated for therapy today.     RANGE OF MOTION AND STRENGTH ASSESSMENT  Upper extremity ROM is within funct In this OT evaluation patient presents with the following impairments: endurance, knowledge of precautions, safety, and pain. These deficits manifest functionally while performing mobility and self-care.    The patient is below baseline and would benefit f

## 2017-08-29 NOTE — CONSULTS
[facility]    PATIENT'S NAME: Flor Jerry   ATTENDING PHYSICIAN: CORDELL [place]: Radha Killian M.D.    PATIENT ACCOUNT#:   [de-identified]    LOCATION:  [address] [place] [address]  MEDICAL RECORD #:   [id number]       DATE Bowel sounds are present, hypoactive. EXTREMITIES:  Without edema. LABORATORY DATA:  Hemoglobin A1c of 6% on 08/24/2016. Serum glucose this morning while on an insulin drip, glucose 119.   Sodium 147, potassium 4.7, chloride 115, CO2 is 22, BUN 25, cre

## 2017-08-29 NOTE — PROGRESS NOTES
[facility]  CV Surgery Progress Note    Flip Cisneros Patient Status:  Inpatient    1940 MRN [id number]   [facility] 6NE-A Attending Emily Stubbs MD   Hosp Day # 6 PCP Cony Archibald MD     Subjective:  Pt is doing we

## 2017-08-29 NOTE — PROGRESS NOTES
Met with patient and wife to discuss post op expectations and discharge planning. Wife will be home with patient at time of discharge. Will follow.   Aure Booker RN  Clinical Coordinator  CV Surgery

## 2017-08-29 NOTE — PROGRESS NOTES
[facility]  Progress Note     Coty Whitlock Patient Status:  Inpatient    1940 MRN [id number]   [facility] 8NE-A Attending Radhika Mistry MD   Hosp Day # 6 PCP Ketan Hamilton MD     Chief Complaint:   Chest pain x1 w Insulin Aspart Pen  1-10 Units Subcutaneous Once   • docusate sodium  100 mg Oral BID    Or   • docusate sodium  100 mg Oral BID   • famoTIDine  20 mg Oral BID    Or   • famoTIDine  20 mg Intravenous BID   • mupirocin  1 Application Nasal BID   • ceFAZolin

## 2017-08-29 NOTE — PROGRESS NOTES
Anesthesia Post Op Check    Patient is s/p CABG 2V  POD# 1  Was extubated last night after successful CPAP trial.  Now on  NC SpO2= 95 %. Encouraged incentive spirometry. Pt doing well. Denies pruritis, headaches and denies recall.  Some nausea  No questi

## 2017-08-29 NOTE — PROGRESS NOTES
[facility]  Progress Note    Donald Frazier Patient Status:  Inpatient    1940 MRN [id number]   [facility] 6NE-A Attending Sandra Hawkins MD   Hosp Day # 6 PCP Natacha Lopez MD       Subjective:  Some surgical pain, venu famoTIDine  20 mg Intravenous BID   • mupirocin  1 Application Nasal BID   • ceFAZolin  2 g Intravenous Q8H   • Alfuzosin HCl ER  10 mg Oral Daily with breakfast   • Pravastatin Sodium  20 mg Oral Nightly   • metoprolol Tartrate  25 mg Oral 2x Daily(Beta B

## 2017-08-29 NOTE — OPERATIVE REPORT
[facility]    PATIENT'S NAME: Román Espinoza   ATTENDING PHYSICIAN: Gauri Mjeia M.D.    OPERATING PHYSICIAN: Deidre Magana MD   PATIENT ACCOUNT#:   [id number]    LOCATION:  [address] [place] [address]  MEDICAL RECORD #:   [id number]       DATE OF BIRTH:  0 mammary artery was placed to the LAD in the midportion of the LAD. The internal mammary and LAD were good quality conduits. Rewarming was begun while the proximal anastomosis for the diagonal graft was constructed end-to-side to the aorta.   Warm cardiopl

## 2017-08-29 NOTE — PLAN OF CARE
Assumed pt care at 0730. Pt A/O x4. VSS. NSR. HR 80s. CT removed this am. CXR done. No pneumothorax. Pt c/o mild incisional pain relieved by pain medication. Midsternal dressing C/D/I. ACE wrap to L leg. IS and ambulation encouraged.  Advance diet as Cuca Banks

## 2017-08-30 PROBLEM — Z95.1 S/P CABG (CORONARY ARTERY BYPASS GRAFT): Status: ACTIVE | Noted: 2017-08-30

## 2017-08-30 LAB
BASOPHILS # BLD AUTO: 0.02 X10(3) UL (ref 0–0.1)
BASOPHILS NFR BLD AUTO: 0.1 %
BUN BLD-MCNC: 37 MG/DL (ref 8–20)
CALCIUM BLD-MCNC: 8.2 MG/DL (ref 8.3–10.3)
CHLORIDE: 110 MMOL/L (ref 101–111)
CO2: 23 MMOL/L (ref 22–32)
CREAT BLD-MCNC: 1.57 MG/DL (ref 0.7–1.3)
EOSINOPHIL # BLD AUTO: 0 X10(3) UL (ref 0–0.3)
EOSINOPHIL NFR BLD AUTO: 0 %
ERYTHROCYTE [DISTWIDTH] IN BLOOD BY AUTOMATED COUNT: 13.2 % (ref 11.5–16)
GLUCOSE BLD-MCNC: 148 MG/DL (ref 65–99)
GLUCOSE BLD-MCNC: 161 MG/DL (ref 65–99)
GLUCOSE BLD-MCNC: 161 MG/DL (ref 70–99)
GLUCOSE BLD-MCNC: 162 MG/DL (ref 65–99)
GLUCOSE BLD-MCNC: 171 MG/DL (ref 65–99)
GLUCOSE BLD-MCNC: 175 MG/DL (ref 65–99)
HCT VFR BLD AUTO: 33.2 % (ref 37–53)
HEPARIN INDUCED PLT ANTIBODY: NEGATIVE
HGB BLD-MCNC: 11.3 G/DL (ref 13–17)
IMMATURE GRANULOCYTE COUNT: 0.22 X10(3) UL (ref 0–1)
IMMATURE GRANULOCYTE RATIO %: 1.1 %
LYMPHOCYTES # BLD AUTO: 0.95 X10(3) UL (ref 0.9–4)
LYMPHOCYTES NFR BLD AUTO: 4.9 %
MCH RBC QN AUTO: 32 PG (ref 27–33.2)
MCHC RBC AUTO-ENTMCNC: 34 G/DL (ref 31–37)
MCV RBC AUTO: 94.1 FL (ref 80–99)
MONOCYTES # BLD AUTO: 1.43 X10(3) UL (ref 0.1–0.6)
MONOCYTES NFR BLD AUTO: 7.3 %
NEUTROPHIL ABS PRELIM: 16.91 X10 (3) UL (ref 1.3–6.7)
NEUTROPHILS # BLD AUTO: 16.91 X10(3) UL (ref 1.3–6.7)
NEUTROPHILS NFR BLD AUTO: 86.6 %
PLATELET # BLD AUTO: 66 10(3)UL (ref 150–450)
POTASSIUM SERPL-SCNC: 4.4 MMOL/L (ref 3.6–5.1)
RBC # BLD AUTO: 3.53 X10(6)UL (ref 3.8–5.8)
RED CELL DISTRIBUTION WIDTH-SD: 45.4 FL (ref 35.1–46.3)
SODIUM SERPL-SCNC: 143 MMOL/L (ref 136–144)
WBC # BLD AUTO: 19.5 X10(3) UL (ref 4–13)

## 2017-08-30 PROCEDURE — 99232 SBSQ HOSP IP/OBS MODERATE 35: CPT | Performed by: HOSPITALIST

## 2017-08-30 RX ORDER — FAMOTIDINE 20 MG/1
20 TABLET ORAL DAILY
Status: DISCONTINUED | OUTPATIENT
Start: 2017-08-31 | End: 2017-08-30

## 2017-08-30 RX ORDER — PANTOPRAZOLE SODIUM 40 MG/1
40 TABLET, DELAYED RELEASE ORAL
Status: DISCONTINUED | OUTPATIENT
Start: 2017-08-31 | End: 2017-09-03

## 2017-08-30 RX ORDER — CLOPIDOGREL BISULFATE 75 MG/1
75 TABLET ORAL DAILY
Status: DISCONTINUED | OUTPATIENT
Start: 2017-08-30 | End: 2017-09-03

## 2017-08-30 RX ORDER — FAMOTIDINE 10 MG/ML
20 INJECTION, SOLUTION INTRAVENOUS DAILY
Status: DISCONTINUED | OUTPATIENT
Start: 2017-08-31 | End: 2017-08-30

## 2017-08-30 NOTE — PROGRESS NOTES
[facility]  Progress Note    Colette Moore Patient Status:  Inpatient    1940 MRN [id number]   [facility] 6NE-A Attending Waldo Burden MD   [facility] Day # 7 PCP Janyce Krabbe, MD     Subjective:  Pt states nausea has resolved. Vol up slightly   - TCP - no evidence of bleed, HIPA neg - follow   - JARRELL - Cr up just a bit w/ dwindling UO - will d/w Dr. Madhavi Trevino re: diuretics   - ? PPS - hiccups improved with Solu Medrol - follow   - Pain control/IS/ambulation   - OK for CTU from our persp

## 2017-08-30 NOTE — PROGRESS NOTES
[facility]  Progress Note    Cecily Nassar Patient Status:  Inpatient    1940 MRN [id number]   [facility] 6NE-A Attending Alex De León MD   Hosp Day # 7 PCP Manpreet Adam MD     Subjective: doing well, mid incisional chest 08/28/17   1135  08/29/17   0429  08/30/17   0427   NA  142  147*  143   K  4.3  4.7  4.4   CL  112*  115*  110   CO2  25.0  22.0  23.0   BUN  22*  25*  37*   CREATSERUM  1.10  1.48*  1.57*   CA  10.3  8.4  8.2*   MG  2.8  2.0   --    GLU  137*  119*  161 Rohit Martinez MD            Xr Chest Ap Portable  (cpt=71010)    Result Date: 8/28/2017  PROCEDURE:  XR CHEST AP PORTABLE (CPT=71010)  TECHNIQUE:  AP chest radiograph was obtained.   COMPARISON:  [facility] XR CHEST AP PORTABLE  (CPT=71010), 8/23/2017, 17: Insulin Aspart Pen (NOVOLOG) 100 UNIT/ML flexpen 1-20 Units 1-20 Units Subcutaneous TID CC and HS   acetaminophen (TYLENOL EXTRA STRENGTH) tab 500 mg 500 mg Oral Q4H PRN   ipratropium-albuterol (DUONEB) nebulizer solution 3 mL 3 mL Nebulization Q4H PRN anaphylactic reaction. 7. HIPA (-)  -  thrombocytopenia. ECG - no acute ischemia. CXR - no congestion or pneuomthorax, very small left pleural effusion.     Plan:  - metoprolol low dose  - no ASA  - Plavix for CAD (no stent) when OK with Dr. Jaci marcelo

## 2017-08-30 NOTE — PAYOR COMM NOTE
--------------  CONTINUED STAY REVIEW    Payor: [facility]  Subscriber #:  [id number]  Authorization Number: [id number]    Admit date: 8/23/17  Admit time: 1955    Admitting Physician: Jessica Mosqueda MD  Attending Physician:  Chichi Dias MD Given 1 Units Subcutaneous (Left Upper Arm) Elio Eldridge RN      Insulin Aspart Pen (NOVOLOG) 100 UNIT/ML flexpen 1-10 Units     Date Action Dose Route User    8/30/2017 0214 Given 1 Units Subcutaneous (Right Upper Arm) Day, Roderick Torres, RN      Insulin Asp - TCP - no evidence of bleed, HIPA neg - follow                          - JARRELL - Cr up just a bit w/ dwindling UO - will d/w Dr. Graeme Billings re: diuretics                          - ?PPS - hiccups improved with Solu Medrol - follow                          -

## 2017-08-30 NOTE — PLAN OF CARE
Received pt from CCU this evening- Pt POD #2 from CABG x2. Pt A&O, denies pain. Satting 90% on RA. Encouraged IS use. NSR on tele. Midsternal, LLE donor site C&D, approximated. Pt and family updated on POC.

## 2017-08-30 NOTE — PHYSICAL THERAPY NOTE
PHYSICAL THERAPY TREATMENT NOTE - INPATIENT    Room Number: 6731/8242-J     Session: 1  Number of Visits to Meet Established Goals: 5    Presenting Problem: NSTEMI, s/p CABG 8/28/17     History related to current admission: Pt is a 65 y.o.  Male admitted 8 currently need. ..   -   Moving to and from a bed to a chair (including a wheelchair)?: A Little   -   Need to walk in hospital room?: A Little   -   Climbing 3-5 steps with a railing?: A Little       AM-PAC Score:  Raw Score: 17   PT Approx Degree of Impai chest pain, nausea, high level balance impairments, and decreased knowledge of sternal precautions. These impairments manifest themselves as functional limitations in bed mobility, transfers, ambulation, and stair negotiation.  The patient is below his base

## 2017-08-30 NOTE — PROGRESS NOTES
[facility]  Progress Note     Kamille Rosas Patient Status:  Inpatient    1940 MRN [id number]   [facility] 8NE-A Attending Pavel Garay MD   Hosp Day # 7 PCP Marychuy Colin MD     Chief Complaint:   Chest pain x1 w Imaging: Imaging data reviewed in Epic.   MICRO:   Medications:   • Insulin Aspart Pen  1-20 Units Subcutaneous TID CC and HS   • docusate sodium  100 mg Oral BID    Or   • docusate sodium  100 mg Oral BID   • famoTIDine  20 mg Oral BID    Or   • famoTI nondistended  Neuro: nonfocal; moves all limbs; cnii-xii grossly intact    Assessment/Plan:  I have had a face-to-face encounter with this patient and agree with carla harrell's assessment and plan for this patient.  Doing well post procedure, following pl

## 2017-08-30 NOTE — PROGRESS NOTES
[facility]  Progress Note    Kamille Rosas Patient Status:  Inpatient    1940 MRN [id number]   [facility] 6NE-A Attending Vito Hill MD   [facility] Day # 7 PCP Marychuy Colin MD     Assessment/Plan:  Patient Active Pro 66.0*       Recent Labs   08/28/17  1135  08/29/17  0429  08/30/17  0211 08/30/17 0427     --  147*  --   --  143   K 4.3  --  4.7  --   --  4.4   *  --  115*  --   --  110   CO2 25.0  --  22.0  --   --  23.0   BUN 22*  --  25*  --   --  37*

## 2017-08-30 NOTE — PROGRESS NOTES
[facility] Pharmacy Note: Renal dose adjustment for Famotidine (Pepcid)  Jeff Camejo has been prescribed Famotidine (Pepcid) 20 mg every 12 hours. Estimated Creatinine Clearance: 38.8 mL/min (based on SCr of 1.57 mg/dL).     His calculated creatinine

## 2017-08-30 NOTE — OCCUPATIONAL THERAPY NOTE
OCCUPATIONAL THERAPY TREATMENT NOTE - INPATIENT     Room Number: 7028/2914-L  Session: 1   Number of Visits to Meet Established Goals: 5    Presenting Problem: CABG    History related to current admission: Pt is a 65year old male admit on 8/23/2017 for CA Minimum assistance  Sit to Stand:  Moderate assistance    Skilled Therapy Provided: Pt was received up in chair for session, therapist educated pt on sternal precautions, pt was able to amb x1 in room and hallway and bathroom with min assist, therapist kiah while following sternal precautions with 1 verbal cue  Pt will stand at sink for 5 minutes to complete grooming routine  Pt will verbalize at least 3 energy conservation techniques

## 2017-08-31 LAB
BLOOD TYPE BARCODE: 6200
BUN BLD-MCNC: 42 MG/DL (ref 8–20)
CALCIUM BLD-MCNC: 8.1 MG/DL (ref 8.3–10.3)
CHLORIDE: 110 MMOL/L (ref 101–111)
CO2: 25 MMOL/L (ref 22–32)
CREAT BLD-MCNC: 1.2 MG/DL (ref 0.7–1.3)
ERYTHROCYTE [DISTWIDTH] IN BLOOD BY AUTOMATED COUNT: 13.2 % (ref 11.5–16)
GLUCOSE BLD-MCNC: 113 MG/DL (ref 65–99)
GLUCOSE BLD-MCNC: 119 MG/DL (ref 65–99)
GLUCOSE BLD-MCNC: 133 MG/DL (ref 65–99)
GLUCOSE BLD-MCNC: 143 MG/DL (ref 70–99)
GLUCOSE BLD-MCNC: 146 MG/DL (ref 65–99)
GLUCOSE BLD-MCNC: 173 MG/DL (ref 65–99)
HCT VFR BLD AUTO: 30.8 % (ref 37–53)
HGB BLD-MCNC: 10.2 G/DL (ref 13–17)
ISTAT ACTIVATED CLOTTING TIME: 103 SECONDS (ref 74–137)
ISTAT ACTIVATED CLOTTING TIME: 125 SECONDS (ref 74–137)
ISTAT ACTIVATED CLOTTING TIME: 428 SECONDS (ref 74–137)
ISTAT ACTIVATED CLOTTING TIME: 516 SECONDS (ref 74–137)
ISTAT BLOOD GAS BASE DEFICIT: -1 MMOL/L
ISTAT BLOOD GAS BASE DEFICIT: -2 MMOL/L
ISTAT BLOOD GAS BASE EXCESS: 2 MMOL/L
ISTAT BLOOD GAS HCO3: 23.7 MEQ/L (ref 22–26)
ISTAT BLOOD GAS HCO3: 25 MEQ/L (ref 22–26)
ISTAT BLOOD GAS HCO3: 26.1 MEQ/L (ref 22–26)
ISTAT BLOOD GAS O2 SATURATION: 100 % (ref 92–100)
ISTAT BLOOD GAS O2 SATURATION: 100 % (ref 92–100)
ISTAT BLOOD GAS O2 SATURATION: 99 % (ref 92–100)
ISTAT BLOOD GAS PCO2: 40 MMHG (ref 35–45)
ISTAT BLOOD GAS PCO2: 41 MMHG (ref 35–45)
ISTAT BLOOD GAS PCO2: 44 MMHG (ref 35–45)
ISTAT BLOOD GAS PH: 7.34 (ref 7.35–7.45)
ISTAT BLOOD GAS PH: 7.37 (ref 7.35–7.45)
ISTAT BLOOD GAS PH: 7.42 (ref 7.35–7.45)
ISTAT BLOOD GAS PO2: 147 MMHG (ref 80–105)
ISTAT BLOOD GAS PO2: 319 MMHG (ref 80–105)
ISTAT BLOOD GAS PO2: 421 MMHG (ref 80–105)
ISTAT BLOOD GAS TCO2: 25 MMOL/L (ref 22–32)
ISTAT BLOOD GAS TCO2: 27 MMOL/L (ref 22–32)
ISTAT BLOOD GAS TCO2: 27 MMOL/L (ref 22–32)
ISTAT HEMATOCRIT: 22 % (ref 37–54)
ISTAT HEMATOCRIT: 26 % (ref 37–54)
ISTAT HEMATOCRIT: 37 % (ref 37–54)
ISTAT IONIZED CALCIUM: 1.04 MMOL/L (ref 1.12–1.32)
ISTAT IONIZED CALCIUM: 1.16 MMOL/L (ref 1.12–1.32)
ISTAT IONIZED CALCIUM: 1.26 MMOL/L (ref 1.12–1.32)
ISTAT PATIENT TEMPERATURE: 32 DEGREE
ISTAT PATIENT TEMPERATURE: 37 DEGREE
ISTAT POTASSIUM: 3.6 MMOL/L (ref 3.6–5.1)
ISTAT POTASSIUM: 5.3 MMOL/L (ref 3.6–5.1)
ISTAT POTASSIUM: 5.4 MMOL/L (ref 3.6–5.1)
ISTAT SODIUM: 132 MMOL/L (ref 136–144)
ISTAT SODIUM: 137 MMOL/L (ref 136–144)
ISTAT SODIUM: 141 MMOL/L (ref 136–144)
MCH RBC QN AUTO: 31.1 PG (ref 27–33.2)
MCHC RBC AUTO-ENTMCNC: 33.1 G/DL (ref 31–37)
MCV RBC AUTO: 93.9 FL (ref 80–99)
PLATELET # BLD AUTO: 74 10(3)UL (ref 150–450)
POTASSIUM SERPL-SCNC: 4.3 MMOL/L (ref 3.6–5.1)
RBC # BLD AUTO: 3.28 X10(6)UL (ref 3.8–5.8)
RED CELL DISTRIBUTION WIDTH-SD: 45.5 FL (ref 35.1–46.3)
SODIUM SERPL-SCNC: 142 MMOL/L (ref 136–144)
WBC # BLD AUTO: 16.7 X10(3) UL (ref 4–13)

## 2017-08-31 PROCEDURE — 99232 SBSQ HOSP IP/OBS MODERATE 35: CPT | Performed by: HOSPITALIST

## 2017-08-31 NOTE — OCCUPATIONAL THERAPY NOTE
OCCUPATIONAL THERAPY TREATMENT NOTE - INPATIENT     Room Number: 1776/8917-V  Session: 2  Number of Visits to Meet Established Goals: 5    Presenting Problem: CABG    History related to current admission: Pt is a 65year old male admit on 8/23/2017 for CAB CK    FUNCTIONAL TRANSFER ASSESSMENT  Supine to Sit : Not tested  Sit to Stand: Minimum assistance    Skilled Therapy Provided: Pt was received up in chair for session, therapist educated pt on sternal precautions, pt was able to amb x1 in room and hallway will complete all ADLs and IADLs while following sternal precautions with 1 verbal cue  Pt will stand at sink for 5 minutes to complete grooming routine  Pt will verbalize at least 3 energy conservation techniques

## 2017-08-31 NOTE — PROGRESS NOTES
[facility]  Progress Note    Birtha Novel Patient Status:  Inpatient    1940 MRN [id number]   [facility] 8NE-A Attending Kelvin Cunha MD   Hosp Day # 8 PCP Cony Archibald MD     Subjective:  Pt doing OK.  Not very hungry, AM

## 2017-08-31 NOTE — PHYSICAL THERAPY NOTE
PHYSICAL THERAPY TREATMENT NOTE - INPATIENT    Room Number:  1468/1415-U     Session: 2  Number of Visits to Meet Established Goals: 5    Presenting Problem: NSTEMI, s/p CABG 8/28/17     History related to current admission: Pt is a 65 y.o.  Male admitted from lying on back to sitting on the side of the bed?: A Little   How much help from another person does the patient currently need. ..   -   Moving to and from a bed to a chair (including a wheelchair)?: A Little   -   Need to walk in hospital room?: A Lit place; Family present    ASSESSMENT   Pt demonstrating improvement, however, cont to present with dec LE strength, dec balance, activity tolerance, dec endurance.   Patient will benefit from continued inpatient physical therapy to address above issues so carlos

## 2017-08-31 NOTE — PROGRESS NOTES
[facility]  Progress Note     Denae Gonzalez Patient Status:  Inpatient    1940 MRN [id number]   [facility] 8NE-A Attending Maurisio Mackey MD   Hosp Day # 8 PCP Isabela Ward MD     Chief Complaint:   Chest pain x1 week Pantoprazole Sodium  40 mg Oral QAM AC   • Clopidogrel Bisulfate  75 mg Oral Daily   • Insulin Aspart Pen  1-20 Units Subcutaneous TID CC and HS   • docusate sodium  100 mg Oral BID    Or   • docusate sodium  100 mg Oral BID   • mupirocin  1 Application Na

## 2017-08-31 NOTE — PROGRESS NOTES
Met with patient and wife to discuss post op expectations, discharge planning. Pt is hesitant to walk or be in the chair, has only walked once today. Discussed importance of ambulating 3-4 x/day, up in chair for all meals, cdb/is. He understands.   Will

## 2017-08-31 NOTE — PLAN OF CARE
Patient is alert and oriented, [place] (bilateral hearing aids at home), Saturates well on room air, NSR on the tele. Mid sternal and LL  incision is c/d/i and approximated. Painted with betadine. Patient denies any pain or SOB.  Does state he feels weaker than

## 2017-08-31 NOTE — PROGRESS NOTES
[facility]  Progress Note    Erika Ray Patient Status:  Inpatient    1940 MRN [id number]   [facility] 6NE-A Attending López Morrissey MD   Hosp Day # 8 PCP Danae Man MD     Subjective: doing well, mid incisional chest 1.22*   --    --    --        Recent Labs   Lab  08/28/17   1135  08/29/17   0429  08/30/17   0427  08/31/17   0448   NA  142  147*  143  142   K  4.3  4.7  4.4  4.3   CL  112*  115*  110  110   CO2  25.0  22.0  23.0  25.0   BUN  22*  25*  37*  42*   CREA internal carotid arteries bilaterally.     Dictated by: Matheus Mederos MD on 8/24/2017 at 17:56     Approved by: Matheus Mederos MD            Xr Chest Ap Portable  (cpt=71010)    Result Date: 8/28/2017  PROCEDURE:  XR CHEST AP PORTABLE (CPT=71010)  TECH Oral QAM AC   Clopidogrel Bisulfate (PLAVIX) tab 75 mg 75 mg Oral Daily   Insulin Aspart Pen (NOVOLOG) 100 UNIT/ML flexpen 1-20 Units 1-20 Units Subcutaneous TID CC and HS   acetaminophen (TYLENOL EXTRA STRENGTH) tab 500 mg 500 mg Oral Q4H PRN   ipratropiu Hypercholesterolemia - on statin. 5. Family h/o CAD. 6. ASA allergy with anaphylactic reaction. 7. HIPA (-)  -  Thrombocytopenia - better. Off Pepcid. Plavix initiated. ECG - no acute ischemia.   CXR - no congestion or pneuomthorax, very small left pl

## 2017-08-31 NOTE — PROGRESS NOTES
[facility]  Progress Note    Denae Gonzalez Patient Status:  Inpatient    1940 MRN [id number]   [facility] 8NE-A Attending Manfred Gottron, MD   [facility] Day # 8 PCP Isabela Ward MD     Assessment/Plan:  Patient Active Problem Tressa Kumar persistent hand edema        Labs:    Recent Labs   08/30/17  0427 08/31/17  0448   WBC 19.5* 16.7*   HGB 11.3* 10.2*   PLT 66.0* 74.0*       Recent Labs   08/28/17  1135  08/29/17  0429  08/30/17  0427  08/31/17  0448 08/31/17  0734     --  147*  --

## 2017-09-01 LAB
BASOPHILS # BLD AUTO: 0.02 X10(3) UL (ref 0–0.1)
BASOPHILS NFR BLD AUTO: 0.2 %
BUN BLD-MCNC: 32 MG/DL (ref 8–20)
CALCIUM BLD-MCNC: 8 MG/DL (ref 8.3–10.3)
CHLORIDE: 112 MMOL/L (ref 101–111)
CO2: 24 MMOL/L (ref 22–32)
CREAT BLD-MCNC: 0.94 MG/DL (ref 0.7–1.3)
EOSINOPHIL # BLD AUTO: 0.05 X10(3) UL (ref 0–0.3)
EOSINOPHIL NFR BLD AUTO: 0.5 %
ERYTHROCYTE [DISTWIDTH] IN BLOOD BY AUTOMATED COUNT: 13.3 % (ref 11.5–16)
GLUCOSE BLD-MCNC: 126 MG/DL (ref 70–99)
HCT VFR BLD AUTO: 32.4 % (ref 37–53)
HGB BLD-MCNC: 10.8 G/DL (ref 13–17)
IMMATURE GRANULOCYTE COUNT: 0.11 X10(3) UL (ref 0–1)
IMMATURE GRANULOCYTE RATIO %: 1 %
LYMPHOCYTES # BLD AUTO: 1.35 X10(3) UL (ref 0.9–4)
LYMPHOCYTES NFR BLD AUTO: 12.6 %
MCH RBC QN AUTO: 31.3 PG (ref 27–33.2)
MCHC RBC AUTO-ENTMCNC: 33.3 G/DL (ref 31–37)
MCV RBC AUTO: 93.9 FL (ref 80–99)
MONOCYTES # BLD AUTO: 1.04 X10(3) UL (ref 0.1–0.6)
MONOCYTES NFR BLD AUTO: 9.7 %
NEUTROPHIL ABS PRELIM: 8.12 X10 (3) UL (ref 1.3–6.7)
NEUTROPHILS # BLD AUTO: 8.12 X10(3) UL (ref 1.3–6.7)
NEUTROPHILS NFR BLD AUTO: 76 %
PLATELET # BLD AUTO: 121 10(3)UL (ref 150–450)
POTASSIUM SERPL-SCNC: 4.1 MMOL/L (ref 3.6–5.1)
RBC # BLD AUTO: 3.45 X10(6)UL (ref 3.8–5.8)
RED CELL DISTRIBUTION WIDTH-SD: 45.4 FL (ref 35.1–46.3)
SODIUM SERPL-SCNC: 145 MMOL/L (ref 136–144)
WBC # BLD AUTO: 10.7 X10(3) UL (ref 4–13)

## 2017-09-01 PROCEDURE — 99232 SBSQ HOSP IP/OBS MODERATE 35: CPT | Performed by: HOSPITALIST

## 2017-09-01 NOTE — PHYSICAL THERAPY NOTE
PHYSICAL THERAPY TREATMENT NOTE - INPATIENT    Room Number:  0178/5673-B     Session: 3  Number of Visits to Meet Established Goals: 5    Presenting Problem: NSTEMI, s/p CABG 8/28/17     History related to current admission: Pt is a 65 y.o.  Male admitted back to sitting on the side of the bed?: A Little   How much help from another person does the patient currently need. ..   -   Moving to and from a bed to a chair (including a wheelchair)?: A Little   -   Need to walk in hospital room?: A [address] addressed    ASSESSMENT   Pt demonstrating improvement, however, cont to present with dec LE strength, dec balance, activity tolerance, dec endurance.   Patient will benefit from continued inpatient physical therapy to address above issues so that patient m

## 2017-09-01 NOTE — PROGRESS NOTES
[facility]  Progress Note    Pancho Rodriguez Patient Status:  Inpatient    1940 MRN [id number]   [facility] 8NE-A Attending Jaron Marie MD   Hosp Day # 9 PCP Charissa Romero MD       Assessment/Plan:76 yo admitted with BRINDA s HGB 10.8 (L) 13.0 - 17.0 g/dL   HCT 32.4 (L) 37.0 - 53.0 %   .0 (L) 150.0 - 450.0 10(3)uL   MCV 93.9 80.0 - 99.0 fL   MCH 31.3 27.0 - 33.2 pg   MCHC 33.3 31.0 - 37.0 g/dL   RDW 13.3 11.5 - 16.0 %   RDW-SD 45.4 35.1 - 46.3 fL   Neutrophil Absolute

## 2017-09-01 NOTE — PAYOR COMM NOTE
--------------  CONTINUED STAY REVIEW    Payor: [employer] [facility]  Subscriber #:  [id number]  Authorization Number: [id number]    Admit date: 8/23/17  Admit time: 1955    Admitting Physician: Sushila Gabriel MD  Attending Physician:  Ty Jacobo MD has normalized                          - TCP - HIPA neg, improving                          - Pain control/IS/ambulation  9/1  S/p CABG POD #4                          - HD stable                          - Vol OK                          - Ecchymosis/Hal

## 2017-09-01 NOTE — PROGRESS NOTES
Met with patient to discuss discharge instructions, activity restrictions and recommendations, follow up visits, wife not at bedside today, she is working. Pt requesting discharge on Monday as wife is working this weekend.   She will be home with him next

## 2017-09-01 NOTE — PROGRESS NOTES
[facility]  Progress Note    Maurice Saul Patient Status:  Inpatient    1940 MRN [id number]   [facility] 8NE-A Attending Manuel Wright MD   [facility] Day # 9 PCP Lucero Genao MD     Subjective:  Pt feeling well today.  Had a B 2+, moderate ecchymosis/edema at [place] site  Extremities: trace BLE edema.    Skin: warm/dry  Neurological: grossly intact    Assessment/Plan: S/p CABG POD #4   - HD stable   - Vol OK   - Ecchymosis/Edema Lt hand - neurovascular intact - follow   - Plavi

## 2017-09-01 NOTE — PROGRESS NOTES
[facility]  Progress Note     Birtha Novel Patient Status:  Inpatient    1940 MRN [id number]   [facility] 8NE-A Attending Emily Stubbs MD   [address] Day # 9 PCP Cony Archibald MD     Chief Complaint:   Chest pain x1 week mupirocin  1 Application Nasal BID   • Alfuzosin HCl ER  10 mg Oral Daily with breakfast   • Pravastatin Sodium  20 mg Oral Nightly   • metoprolol Tartrate  25 mg Oral 2x Daily(Beta Blocker)       ASSESSMENT / PLAN:     1. NSTEMI- diseased LAD     2.    CAB

## 2017-09-01 NOTE — PROGRESS NOTES
· [facility] Progress Note     Subjective:  Up in halls with walker    Objective:  123/60  tmax 99  SR    BUN/cr 32/0.94  I/O incomplete    Hgb 10.8 plt 121  BUN/cr 32/0.94    Neuro:awake/alert  HEENT:no JVD  Cardiac:S1 S2 regular  Lungs: decr

## 2017-09-02 LAB
BASOPHILS # BLD AUTO: 0.02 X10(3) UL (ref 0–0.1)
BASOPHILS NFR BLD AUTO: 0.2 %
BUN BLD-MCNC: 25 MG/DL (ref 8–20)
CALCIUM BLD-MCNC: 8.1 MG/DL (ref 8.3–10.3)
CHLORIDE: 110 MMOL/L (ref 101–111)
CO2: 26 MMOL/L (ref 22–32)
CREAT BLD-MCNC: 1.03 MG/DL (ref 0.7–1.3)
EOSINOPHIL # BLD AUTO: 0.25 X10(3) UL (ref 0–0.3)
EOSINOPHIL NFR BLD AUTO: 2.6 %
ERYTHROCYTE [DISTWIDTH] IN BLOOD BY AUTOMATED COUNT: 13.3 % (ref 11.5–16)
GLUCOSE BLD-MCNC: 115 MG/DL (ref 70–99)
HCT VFR BLD AUTO: 32.4 % (ref 37–53)
HGB BLD-MCNC: 10.9 G/DL (ref 13–17)
IMMATURE GRANULOCYTE COUNT: 0.33 X10(3) UL (ref 0–1)
IMMATURE GRANULOCYTE RATIO %: 3.5 %
LYMPHOCYTES # BLD AUTO: 1.63 X10(3) UL (ref 0.9–4)
LYMPHOCYTES NFR BLD AUTO: 17.1 %
MCH RBC QN AUTO: 31.2 PG (ref 27–33.2)
MCHC RBC AUTO-ENTMCNC: 33.6 G/DL (ref 31–37)
MCV RBC AUTO: 92.8 FL (ref 80–99)
MONOCYTES # BLD AUTO: 0.9 X10(3) UL (ref 0.1–0.6)
MONOCYTES NFR BLD AUTO: 9.4 %
NEUTROPHIL ABS PRELIM: 6.41 X10 (3) UL (ref 1.3–6.7)
NEUTROPHILS # BLD AUTO: 6.41 X10(3) UL (ref 1.3–6.7)
NEUTROPHILS NFR BLD AUTO: 67.2 %
PLATELET # BLD AUTO: 161 10(3)UL (ref 150–450)
POTASSIUM SERPL-SCNC: 3.8 MMOL/L (ref 3.6–5.1)
RBC # BLD AUTO: 3.49 X10(6)UL (ref 3.8–5.8)
RED CELL DISTRIBUTION WIDTH-SD: 45.6 FL (ref 35.1–46.3)
SODIUM SERPL-SCNC: 145 MMOL/L (ref 136–144)
WBC # BLD AUTO: 9.5 X10(3) UL (ref 4–13)

## 2017-09-02 PROCEDURE — 99232 SBSQ HOSP IP/OBS MODERATE 35: CPT | Performed by: HOSPITALIST

## 2017-09-02 RX ORDER — POTASSIUM CHLORIDE 20 MEQ/1
40 TABLET, EXTENDED RELEASE ORAL ONCE
Status: COMPLETED | OUTPATIENT
Start: 2017-09-02 | End: 2017-09-02

## 2017-09-02 NOTE — PROGRESS NOTES
[facility]   [facility] Progress Note    Domingo King Patient Status:  Inpatient    1940 MRN [id number]   [place] 8NE-A Attending Jeb Benedict MD   [facility] Day # 10 PCP Mitchell Forrest MD     Subjective:    Feels good.  Doing ok Glucose-Vitamin C (DEX-4) 4-0.006 g chewable tab 4 tablet 4 tablet Oral Q15 Min PRN   Or      dextrose 50% injection 50 mL 50 mL Intravenous Q15 Min PRN   Or      glucose (DEX4) oral liquid 30 g 30 g Oral Q15 Min PRN   Or      Glucose-Vitamin C (DEX-4) 4

## 2017-09-02 NOTE — PLAN OF CARE
Assumed care of patient around 1930, alert and oriented X4, spouse at bedside, no c/o CP/SOB, SpO2 94 % on RA  Rhythm/HR: NSR 60-70s  MS and left leg X1- steri strips, MANUEL, painted with betadine  Went for a walk tonight with spouse, tolerated well  Will co

## 2017-09-02 NOTE — PROGRESS NOTES
[facility]  Progress Note     Jen Gonzales Patient Status:  Inpatient    1940 MRN [id number]   [facility] 8NE-A Attending David Lopez MD   Hosp Day # 10 PCP Atif Nathan MD     Chief Complaint:   Chest pain x1 week HCl ER  10 mg Oral Daily with breakfast   • Pravastatin Sodium  20 mg Oral Nightly   • metoprolol Tartrate  25 mg Oral 2x Daily(Beta Blocker)       ASSESSMENT / PLAN:     1. NSTEMI- diseased LAD     2. CABG x2 LIMA to LAD, SVG to  [facility]   8/28   1.  Cards /C

## 2017-09-02 NOTE — PHYSICAL THERAPY NOTE
PHYSICAL THERAPY TREATMENT NOTE - INPATIENT    Room Number: 3705/5486-D     Session: 4   Number of Visits to Meet Established Goals: 5    Presenting Problem: NSTEMI, s/p CABG 8/28/17    Problem List  Principal Problem:    S/P CABG (coronary artery bypass hospital room?: A Little   -   Climbing 3-5 steps with a railing?: A Little       AM-PAC Score:  Raw Score: 19   PT Approx Degree of Impairment Score: 41.77%   Standardized Score (AM-PAC Scale): 45.44   CMS Modifier (G-Code): CK    FUNCTIONAL ABILITY STATU training;Transfer training;Balance training  Rehab Potential : Good  Frequency (Obs): 5x/week    CURRENT GOALS   Goal #1 Patient is able to demonstrate supine - sit EOB @ level: modified independent   Goal #2 Patient is able to demonstrate transfers EOB to

## 2017-09-02 NOTE — OCCUPATIONAL THERAPY NOTE
OCCUPATIONAL THERAPY TREATMENT NOTE - INPATIENT     Room Number: 3859/1513-A  Session: 3   Number of Visits to Meet Established Goals: 5    Presenting Problem: CABG    History related to current admission: Pt is a 65year old male admit on 8/23/2017 for CA Not tested  Sit to Stand: Modified independent      Treatment Summary:  Received pt up in chair for occupational therapy treatment session. Educated the pt about UE and LE dressing sequencing, post- CABG arm and chest exercises, and activity guidelines. independently-MET  Pt will demonstrate ability to follow sternal precautions with independently-MET  Pt will complete all ADLs and IADLs while following sternal precautions with 1 verbal cue-MET  Pt will stand at sink for 5 minutes to complete grooming rou

## 2017-09-02 NOTE — PROGRESS NOTES
[facility]  Progress Note    Adalgisa Joyner Patient Status:  Inpatient    1940 MRN [id number]   [facility] 8NE-A Attending Nancy Goncalves MD   Hosp Day # 8 PCP Natasha Fox MD       Assessment/Plan:78 yo admitted with CP, - 5.80 x10(6)uL   HGB 10.9 (L) 13.0 - 17.0 g/dL   HCT 32.4 (L) 37.0 - 53.0 %   .0 150.0 - 450.0 10(3)uL   MCV 92.8 80.0 - 99.0 fL   MCH 31.2 27.0 - 33.2 pg   MCHC 33.6 31.0 - 37.0 g/dL   RDW 13.3 11.5 - 16.0 %   RDW-SD 45.6 35.1 - 46.3 fL   Neutroph

## 2017-09-02 NOTE — PLAN OF CARE
Diabetes/Glucose Control    • Glucose maintained within prescribed range Completed                          CARDIOVASCULAR - ADULT    • Maintains optimal cardiac output and hemodynamic stability Progressing    • Absence of cardiac arrhythmias or at baselin

## 2017-09-02 NOTE — PROGRESS NOTES
[facility]  Progress Note    Lisbeth Means Patient Status:  Inpatient    1940 MRN [id number]   [facility] 8NE-A Attending Manish Fonseca MD   [facility] Day # 10 PCP Akua Weber MD     Assessment:  #POD 5 post CABG with normal

## 2017-09-03 VITALS
WEIGHT: 193.31 LBS | OXYGEN SATURATION: 98 % | BODY MASS INDEX: 28.63 KG/M2 | SYSTOLIC BLOOD PRESSURE: 97 MMHG | HEART RATE: 70 BPM | TEMPERATURE: 98 F | DIASTOLIC BLOOD PRESSURE: 57 MMHG | HEIGHT: 69 IN | RESPIRATION RATE: 18 BRPM

## 2017-09-03 PROBLEM — R07.9 ACUTE CHEST PAIN: Status: RESOLVED | Noted: 2017-08-23 | Resolved: 2017-09-03

## 2017-09-03 LAB — POTASSIUM SERPL-SCNC: 4.2 MMOL/L (ref 3.6–5.1)

## 2017-09-03 PROCEDURE — 99239 HOSP IP/OBS DSCHRG MGMT >30: CPT | Performed by: HOSPITALIST

## 2017-09-03 RX ORDER — CLOPIDOGREL BISULFATE 75 MG/1
75 TABLET ORAL DAILY
Qty: 30 TABLET | Refills: 11 | Status: SHIPPED | OUTPATIENT
Start: 2017-09-03 | End: 2021-03-01

## 2017-09-03 RX ORDER — METOPROLOL SUCCINATE 50 MG/1
25 TABLET, EXTENDED RELEASE ORAL DAILY
Qty: 30 TABLET | Refills: 11 | Status: SHIPPED | OUTPATIENT
Start: 2017-09-03 | End: 2018-07-05

## 2017-09-03 RX ORDER — ALFUZOSIN HYDROCHLORIDE 10 MG/1
10 TABLET, EXTENDED RELEASE ORAL
Qty: 30 TABLET | Refills: 0 | Status: SHIPPED | OUTPATIENT
Start: 2017-09-03 | End: 2017-10-02

## 2017-09-03 NOTE — PROGRESS NOTES
· [facility] Progress Note     Subjective:  Feels good - ready for discharge    Objective:  97/57  Afebrile  SR   K 4.2    Neuro:awake/alert  HEENT:no JVD  Cardiac:S1 S2 regular  Lungs: decreased BS bases  Abdomen:soft  Extremities:no LE edema

## 2017-09-03 NOTE — CM/SW NOTE
09/03/17 1100   Discharge disposition   Discharged to: Home-Health   Name of Facillity/Home Care/Hospice Residential

## 2017-09-03 NOTE — PLAN OF CARE
Assumed care of patient around 1930, alert and oriented X4, no c/o CP/SOB, SpO2 97 % on RA  Rhythm/HR: NSR 70s    MS- steri strips, MANUEL  Left leg X1  IS= 1500    Resting comfortable, will continue to monitor      CARDIOVASCULAR - ADULT    • Maintains optim

## 2017-09-03 NOTE — PROGRESS NOTES
[facility]  Progress Note    Leobardo Aldana Patient Status:  Inpatient    1940 MRN [id number]   [facility] 8NE-A Attending Shraddha Montaño MD   [facility] Day # 6 PCP Bailey Haas MD       Assessment/Plan:78 yo admitted with CP,

## 2017-09-03 NOTE — PROGRESS NOTES
IV and tele discontinued. Pt. Received discharge instructions and follow-up information. Prescriptions sent to pharmacy of choice. Questions addressed and answered. Pt. Transported by wheelchair with tech to d/c transportation.

## 2017-09-03 NOTE — PLAN OF CARE
PAIN - ADULT    • Verbalizes/displays adequate comfort level or patient's stated pain goal Completed          CARDIOVASCULAR - ADULT    • Maintains optimal cardiac output and hemodynamic stability Progressing    • Absence of cardiac arrhythmias or at basel

## 2017-09-03 NOTE — PHYSICAL THERAPY NOTE
Pt is post of Day 6 from CABG, has orders to be discharged home and is dressed and ready to go, awaiting one more MD clearance.   Pt reports ambulating previously today and defers ambulation with PT.  Pt's wife brought home RW to room for PT to adjust heigh

## 2017-09-03 NOTE — PROGRESS NOTES
[facility]  Progress Note     Daxa Solorzano Patient Status:  Inpatient    1940 MRN [id number]   [facility] 8NE-A Attending Radhika Mistry MD   [facility] Day # 6 PCP Ketan Hamilton MD     Chief Complaint:   Chest pain x1 week Blocker)       ASSESSMENT / PLAN:     1. NSTEMI- diseased LAD     2. CABG x2 LIMA to LAD, SVG to  [facility]   8/28   1. Cards /[facility] following  2. ASA allergy-on plavix  3. Echo preserved EF     3. Essential hypertension  4. Dyslipidemia  5.  Insulin resistance:

## 2017-09-03 NOTE — PROGRESS NOTES
[facility]   [facility] Progress Note    Cole Nash Patient Status:  Inpatient    1940 MRN [id number]   [facility] 8NE-A Attending Guevara Phillips MD   [facility] Day # 6 PCP Bubba Burciaga MD     Subjective:    Feels ready to go ho ondansetron HCl (ZOFRAN) injection 4 mg 4 mg Intravenous Q6H PRN   glucose (DEX4) oral liquid 15 g 15 g Oral Q15 Min PRN   Or      Glucose-Vitamin C (DEX-4) 4-0.006 g chewable tab 4 tablet 4 tablet Oral Q15 Min PRN   Or      dextrose 50% injection 50 mL

## 2017-09-05 NOTE — PAYOR COMM NOTE
--------------  CONTINUED STAY REVIEW    Payor: [address] [employer]  Subscriber #:  [id number]  Authorization Number: [id number]    Admit date: 8/23/17  Admit time: 1954    Admitting Physician: Reuben Bills MD  Attending Physician:  Nano att. providers f

## 2017-09-05 NOTE — DISCHARGE SUMMARY
[facility] [place] [facility]ITAL[facility]  DISCHARGE SUMMARY     Domingo King Patient Status:  Inpatient    1940 MRN [id number]   [facility] 8NE-A Attending No att. providers found   [facility] Day # 6 PCP Mitchell Forrest MD     Date of Admission: 2017 START taking these medications      Instructions Prescription details   Alfuzosin HCl ER 10 MG Tb24  Commonly known as:  UROXATRAL      Take 1 tablet (10 mg total) by mouth daily with breakfast.   Quantity:  30 tablet  Refills:  0     Clopidogrel Bisulfa 9/28/2017  TIME-8:30 AM-GROUND FLOOR [facility]      Vital signs:       Physical Exam:    General: No acute distress. Respiratory: Clear to auscultation bilaterally. No wheezes. No rhonchi. Cardiovascular: Thoracotomy site without erythema.  S1, S2.

## 2017-09-05 NOTE — PAYOR COMM NOTE
--------------  DISCHARGE REVIEW    Payor: [address] [employer]  Subscriber #:  [id number]  Authorization Number: [id number]    Admit date: 8/23/17  Admit time:  1954  Discharge Date: 9/3/2017  2:57 PM     Admitting Physician: Marii Price MD  Attending

## 2017-09-06 ENCOUNTER — TELEPHONE (OUTPATIENT)
Dept: CARDIOLOGY UNIT | Facility: HOSPITAL | Age: 77
End: 2017-09-06

## 2017-09-06 ENCOUNTER — TELEPHONE (OUTPATIENT)
Dept: INTERNAL MEDICINE CLINIC | Facility: CLINIC | Age: 77
End: 2017-09-06

## 2017-09-06 ENCOUNTER — PATIENT OUTREACH (OUTPATIENT)
Dept: CASE MANAGEMENT | Age: 77
End: 2017-09-06

## 2017-09-06 DIAGNOSIS — Z02.9 ENCOUNTERS FOR ADMINISTRATIVE PURPOSE: ICD-10-CM

## 2017-09-06 NOTE — PROGRESS NOTES
Follow Up Phone Call    1. How are you doing now that you are home? No Complaints    2. Have there been any changes in your wound/incision since going home? No    3. Is your pain manageable at home? 4. Are you following the walking routine given to you in the hospital?     5. Are you continuing to use your incentive spirometer? 6. Do you have your appointments for Chest Xray? Primary MD?                                                                Cardiologist?                                                                Dr. Cecily Johnson? Cardiac Rehab? 7. Do you have any other questions or concerns today?          Bonny Reyez  9/6/2017  1:39 PM

## 2017-09-06 NOTE — PAYOR COMM NOTE
--------------  DISCHARGE REVIEW    Payor: [address]  Subscriber #:  [id number]  Authorization Number: [id number]    Admit date: 8/23/17  Admit time:  1955  Discharge Date: 9/3/2017  2:57 PM     Admitting Physician: Antonella Silverman MD  Attending his left shoulder and back. Initial troponin was 0.54, no EKG changes were observed. Brief Synopsis: Patient was evaluated by angiography and showed 99% proximal LAD lesion. Patient underwent CABG with expected post operative course.  He experienced mil Follow-up appointment:   [address]  On 9/12/2017  For a chest xray, central scheduling will call you to arrange the appointment    Willie Iglesias MD  [facility] [place] [address] T

## 2017-09-06 NOTE — TELEPHONE ENCOUNTER
Multiple attempts to reach pt for TCM with no return call. Pt is scheduled for HFU appt on 9/13/17 with Nicolas Guerrero NP but needs to be seen sooner by 9/10/17 as pt is a high-risk for readmission. S/p CABG x2 on 8/28/17 with Dr. Joss Palomo. Please advise.      TRIAGE

## 2017-09-06 NOTE — PROGRESS NOTES
Attempted to reach pt for TCM. Message states, \"the person you have reached does not have a VM box that has been set up. Good bye. \"    [employer] message also sent to pt for TCM.       TE sent to PCP office regarding HFU appt- scheduled 9/13/17 but needs a

## 2017-09-06 NOTE — TELEPHONE ENCOUNTER
S/w pt. States he is improving a little each day. He has no questions or concerns at this time. He is agreeable to move is appointment to a sooner date. Scheduled with SD for this Friday 9/8/17. Pt thankful for follow up.

## 2017-09-08 ENCOUNTER — PRIOR ORIGINAL RECORDS (OUTPATIENT)
Dept: OTHER | Age: 77
End: 2017-09-08

## 2017-09-08 ENCOUNTER — LAB ENCOUNTER (OUTPATIENT)
Dept: LAB | Age: 77
End: 2017-09-08
Attending: NURSE PRACTITIONER
Payer: COMMERCIAL

## 2017-09-08 ENCOUNTER — OFFICE VISIT (OUTPATIENT)
Dept: INTERNAL MEDICINE CLINIC | Facility: CLINIC | Age: 77
End: 2017-09-08

## 2017-09-08 VITALS
DIASTOLIC BLOOD PRESSURE: 56 MMHG | HEIGHT: 69 IN | WEIGHT: 188 LBS | BODY MASS INDEX: 27.85 KG/M2 | HEART RATE: 88 BPM | SYSTOLIC BLOOD PRESSURE: 102 MMHG | TEMPERATURE: 98 F

## 2017-09-08 DIAGNOSIS — R73.9 HYPERGLYCEMIA: ICD-10-CM

## 2017-09-08 DIAGNOSIS — Z95.1 S/P CABG (CORONARY ARTERY BYPASS GRAFT): ICD-10-CM

## 2017-09-08 DIAGNOSIS — N17.9 ACUTE KIDNEY INJURY (HCC): ICD-10-CM

## 2017-09-08 DIAGNOSIS — E78.5 DYSLIPIDEMIA: Primary | ICD-10-CM

## 2017-09-08 DIAGNOSIS — D50.8 OTHER IRON DEFICIENCY ANEMIA: ICD-10-CM

## 2017-09-08 DIAGNOSIS — D69.6 THROMBOCYTOPENIA (HCC): ICD-10-CM

## 2017-09-08 DIAGNOSIS — N40.0 BENIGN PROSTATIC HYPERPLASIA, UNSPECIFIED WHETHER LOWER URINARY TRACT SYMPTOMS PRESENT: ICD-10-CM

## 2017-09-08 DIAGNOSIS — I21.4 NSTEMI (NON-ST ELEVATED MYOCARDIAL INFARCTION) (HCC): ICD-10-CM

## 2017-09-08 DIAGNOSIS — I25.10 CORONARY ARTERY DISEASE INVOLVING NATIVE CORONARY ARTERY OF NATIVE HEART WITHOUT ANGINA PECTORIS: ICD-10-CM

## 2017-09-08 DIAGNOSIS — E78.5 DYSLIPIDEMIA: ICD-10-CM

## 2017-09-08 LAB
ALBUMIN SERPL-MCNC: 2.9 G/DL (ref 3.5–4.8)
ALP LIVER SERPL-CCNC: 72 U/L (ref 45–117)
ALT SERPL-CCNC: 33 U/L (ref 17–63)
AST SERPL-CCNC: 16 U/L (ref 15–41)
BASOPHILS # BLD AUTO: 0.03 X10(3) UL (ref 0–0.1)
BASOPHILS NFR BLD AUTO: 0.3 %
BILIRUB SERPL-MCNC: 0.5 MG/DL (ref 0.1–2)
BUN BLD-MCNC: 18 MG/DL (ref 8–20)
CALCIUM BLD-MCNC: 9.1 MG/DL (ref 8.3–10.3)
CHLORIDE: 110 MMOL/L (ref 101–111)
CO2: 24 MMOL/L (ref 22–32)
CREAT BLD-MCNC: 1.19 MG/DL (ref 0.7–1.3)
EOSINOPHIL # BLD AUTO: 0.21 X10(3) UL (ref 0–0.3)
EOSINOPHIL NFR BLD AUTO: 2 %
ERYTHROCYTE [DISTWIDTH] IN BLOOD BY AUTOMATED COUNT: 13.4 % (ref 11.5–16)
GLUCOSE BLD-MCNC: 106 MG/DL (ref 70–99)
HCT VFR BLD AUTO: 37.5 % (ref 37–53)
HGB BLD-MCNC: 11.7 G/DL (ref 13–17)
IMMATURE GRANULOCYTE COUNT: 0.09 X10(3) UL (ref 0–1)
IMMATURE GRANULOCYTE RATIO %: 0.8 %
LYMPHOCYTES # BLD AUTO: 1.77 X10(3) UL (ref 0.9–4)
LYMPHOCYTES NFR BLD AUTO: 16.7 %
M PROTEIN MFR SERPL ELPH: 6.8 G/DL (ref 6.1–8.3)
MCH RBC QN AUTO: 30.8 PG (ref 27–33.2)
MCHC RBC AUTO-ENTMCNC: 31.2 G/DL (ref 31–37)
MCV RBC AUTO: 98.7 FL (ref 80–99)
MONOCYTES # BLD AUTO: 0.88 X10(3) UL (ref 0.1–0.6)
MONOCYTES NFR BLD AUTO: 8.3 %
NEUTROPHIL ABS PRELIM: 7.64 X10 (3) UL (ref 1.3–6.7)
NEUTROPHILS # BLD AUTO: 7.64 X10(3) UL (ref 1.3–6.7)
NEUTROPHILS NFR BLD AUTO: 71.9 %
PLATELET # BLD AUTO: 322 10(3)UL (ref 150–450)
POTASSIUM SERPL-SCNC: 4.6 MMOL/L (ref 3.6–5.1)
RBC # BLD AUTO: 3.8 X10(6)UL (ref 3.8–5.8)
RED CELL DISTRIBUTION WIDTH-SD: 49.1 FL (ref 35.1–46.3)
SODIUM SERPL-SCNC: 145 MMOL/L (ref 136–144)
WBC # BLD AUTO: 10.6 X10(3) UL (ref 4–13)

## 2017-09-08 PROCEDURE — 85025 COMPLETE CBC W/AUTO DIFF WBC: CPT | Performed by: NURSE PRACTITIONER

## 2017-09-08 PROCEDURE — 99496 TRANSJ CARE MGMT HIGH F2F 7D: CPT | Performed by: NURSE PRACTITIONER

## 2017-09-08 PROCEDURE — 80053 COMPREHEN METABOLIC PANEL: CPT | Performed by: NURSE PRACTITIONER

## 2017-09-08 NOTE — PROGRESS NOTES
HPI:    James Waterman is a 65year old male here today for hospital follow up.    He was discharged from Inpatient hospital, [facility] to Home   Admission Date: 8/23/17   Discharge Date: 9/3/17  Hospital Discharge Diagnosis: Coronary artery diseas classes at this time as his wife is diabetic and is aware of diet. They may consider in the future.     NSTEMI (non-ST elevated myocardial infarction) [facility])    Coronary artery disease involving native coronary artery of native heart without angina pectoris LUNGS: denies shortness of breath with exertion  CARDIOVASCULAR: denies chest pain on exertion or palpitations  GI: denies abdominal pain, denies heartburn, denies diarrhea  MUSCULOSKELETAL: denies pain, normal range of motion of extremities  NEURO: jovanny types were placed in this encounter. Meds & Refills for this Visit:  No prescriptions requested or ordered in this encounter    Imaging & Consults:  None    Reviewed.      Transitional Care Management Certification:  I certify that the following are tr

## 2017-09-12 ENCOUNTER — HOSPITAL ENCOUNTER (OUTPATIENT)
Dept: GENERAL RADIOLOGY | Facility: HOSPITAL | Age: 77
Discharge: HOME | End: 2017-09-12
Attending: THORACIC SURGERY (CARDIOTHORACIC VASCULAR SURGERY)
Payer: COMMERCIAL

## 2017-09-12 ENCOUNTER — PRIOR ORIGINAL RECORDS (OUTPATIENT)
Dept: OTHER | Age: 77
End: 2017-09-12

## 2017-09-12 ENCOUNTER — MYAURORA ACCOUNT LINK (OUTPATIENT)
Dept: OTHER | Age: 77
End: 2017-09-12

## 2017-09-12 ENCOUNTER — TELEPHONE (OUTPATIENT)
Dept: CARDIOLOGY UNIT | Facility: HOSPITAL | Age: 77
End: 2017-09-12

## 2017-09-12 DIAGNOSIS — Z98.890 HISTORY OF OPEN HEART SURGERY: ICD-10-CM

## 2017-09-12 PROCEDURE — 71020 XR CHEST PA + LAT CHEST (CPT=71020): CPT | Performed by: THORACIC SURGERY (CARDIOTHORACIC VASCULAR SURGERY)

## 2017-09-12 PROCEDURE — 71035 XR CHEST DECUBITUS (CPT=71035): CPT | Performed by: THORACIC SURGERY (CARDIOTHORACIC VASCULAR SURGERY)

## 2017-09-19 LAB
ALBUMIN: 2.9 G/DL
ALKALINE PHOSPHATATE(ALK PHOS): 72 IU/L
BILIRUBIN TOTAL: 0.5 MG/DL
BUN: 18 MG/DL
BUN: 20 MG/DL
CALCIUM: 8.5 MG/DL
CALCIUM: 9.1 MG/DL
CHLORIDE: 110 MEQ/L
CHLORIDE: 111 MEQ/L
CHOLESTEROL, TOTAL: 170 MG/DL
CREATININE, SERUM: 1.04 MG/DL
CREATININE, SERUM: 1.19 MG/DL
GLUCOSE: 106 MG/DL
GLUCOSE: 175 MG/DL
HDL CHOLESTEROL: 44 MG/DL
HEMATOCRIT: 37.5 %
HEMATOCRIT: 45.1 %
HEMOGLOBIN A1C: 6 %
HEMOGLOBIN: 11.7 G/DL
HEMOGLOBIN: 15.1 G/DL
LDL CHOLESTEROL: 97 MG/DL
PLATELETS: 164 K/UL
PLATELETS: 322 K/UL
POTASSIUM, SERUM: 3.9 MEQ/L
POTASSIUM, SERUM: 4.6 MEQ/L
PROTEIN, TOTAL: 6.8 G/DL
RED BLOOD COUNT: 3.8 X 10-6/U
RED BLOOD COUNT: 4.92 X 10-6/U
SGOT (AST): 16 IU/L
SGPT (ALT): 33 IU/L
SODIUM: 143 MEQ/L
SODIUM: 145 MEQ/L
TRIGLYCERIDES: 143 MG/DL
WHITE BLOOD COUNT: 10.6 X 10-3/U
WHITE BLOOD COUNT: 5 X 10-3/U

## 2017-09-26 ENCOUNTER — HOSPITAL ENCOUNTER (OUTPATIENT)
Dept: CV DIAGNOSTICS | Facility: HOSPITAL | Age: 77
Discharge: HOME | End: 2017-09-26
Attending: INTERNAL MEDICINE
Payer: COMMERCIAL

## 2017-09-26 DIAGNOSIS — I25.10 CVD (CARDIOVASCULAR DISEASE): ICD-10-CM

## 2017-09-26 PROCEDURE — 93017 CV STRESS TEST TRACING ONLY: CPT | Performed by: INTERNAL MEDICINE

## 2017-09-26 PROCEDURE — 93018 CV STRESS TEST I&R ONLY: CPT | Performed by: INTERNAL MEDICINE

## 2017-09-28 ENCOUNTER — CARDPULM VISIT (OUTPATIENT)
Dept: CARDIAC REHAB | Facility: HOSPITAL | Age: 77
End: 2017-09-28
Attending: INTERNAL MEDICINE
Payer: COMMERCIAL

## 2017-09-28 VITALS
HEART RATE: 77 BPM | WEIGHT: 180.19 LBS | DIASTOLIC BLOOD PRESSURE: 60 MMHG | BODY MASS INDEX: 26.69 KG/M2 | RESPIRATION RATE: 20 BRPM | OXYGEN SATURATION: 97 % | HEIGHT: 69 IN | SYSTOLIC BLOOD PRESSURE: 120 MMHG

## 2017-09-28 PROCEDURE — 93798 PHYS/QHP OP CAR RHAB W/ECG: CPT

## 2017-10-02 ENCOUNTER — CARDPULM VISIT (OUTPATIENT)
Dept: CARDIAC REHAB | Facility: HOSPITAL | Age: 77
End: 2017-10-02
Attending: INTERNAL MEDICINE
Payer: COMMERCIAL

## 2017-10-02 PROCEDURE — 93798 PHYS/QHP OP CAR RHAB W/ECG: CPT

## 2017-10-04 ENCOUNTER — CARDPULM VISIT (OUTPATIENT)
Dept: CARDIAC REHAB | Facility: HOSPITAL | Age: 77
End: 2017-10-04
Attending: INTERNAL MEDICINE
Payer: COMMERCIAL

## 2017-10-04 PROCEDURE — 93798 PHYS/QHP OP CAR RHAB W/ECG: CPT

## 2017-10-05 ENCOUNTER — PRIOR ORIGINAL RECORDS (OUTPATIENT)
Dept: OTHER | Age: 77
End: 2017-10-05

## 2017-10-06 ENCOUNTER — CARDPULM VISIT (OUTPATIENT)
Dept: CARDIAC REHAB | Facility: HOSPITAL | Age: 77
End: 2017-10-06
Attending: INTERNAL MEDICINE
Payer: COMMERCIAL

## 2017-10-06 PROCEDURE — 93798 PHYS/QHP OP CAR RHAB W/ECG: CPT

## 2017-10-09 ENCOUNTER — CARDPULM VISIT (OUTPATIENT)
Dept: CARDIAC REHAB | Facility: HOSPITAL | Age: 77
End: 2017-10-09
Attending: INTERNAL MEDICINE
Payer: COMMERCIAL

## 2017-10-09 PROCEDURE — 93798 PHYS/QHP OP CAR RHAB W/ECG: CPT

## 2017-10-11 ENCOUNTER — CARDPULM VISIT (OUTPATIENT)
Dept: CARDIAC REHAB | Facility: HOSPITAL | Age: 77
End: 2017-10-11
Attending: INTERNAL MEDICINE
Payer: COMMERCIAL

## 2017-10-11 PROCEDURE — 93798 PHYS/QHP OP CAR RHAB W/ECG: CPT

## 2017-10-13 ENCOUNTER — CARDPULM VISIT (OUTPATIENT)
Dept: CARDIAC REHAB | Facility: HOSPITAL | Age: 77
End: 2017-10-13
Attending: INTERNAL MEDICINE
Payer: COMMERCIAL

## 2017-10-13 PROCEDURE — 93798 PHYS/QHP OP CAR RHAB W/ECG: CPT

## 2017-10-16 ENCOUNTER — APPOINTMENT (OUTPATIENT)
Dept: CARDIAC REHAB | Facility: HOSPITAL | Age: 77
End: 2017-10-16
Attending: INTERNAL MEDICINE
Payer: COMMERCIAL

## 2017-10-18 ENCOUNTER — APPOINTMENT (OUTPATIENT)
Dept: CARDIAC REHAB | Facility: HOSPITAL | Age: 77
End: 2017-10-18
Attending: INTERNAL MEDICINE
Payer: COMMERCIAL

## 2017-10-20 ENCOUNTER — APPOINTMENT (OUTPATIENT)
Dept: CARDIAC REHAB | Facility: HOSPITAL | Age: 77
End: 2017-10-20
Attending: INTERNAL MEDICINE
Payer: COMMERCIAL

## 2017-10-23 ENCOUNTER — APPOINTMENT (OUTPATIENT)
Dept: CARDIAC REHAB | Facility: HOSPITAL | Age: 77
End: 2017-10-23
Attending: INTERNAL MEDICINE
Payer: COMMERCIAL

## 2017-10-25 ENCOUNTER — APPOINTMENT (OUTPATIENT)
Dept: CARDIAC REHAB | Facility: HOSPITAL | Age: 77
End: 2017-10-25
Attending: INTERNAL MEDICINE
Payer: COMMERCIAL

## 2017-10-27 ENCOUNTER — APPOINTMENT (OUTPATIENT)
Dept: CARDIAC REHAB | Facility: HOSPITAL | Age: 77
End: 2017-10-27
Attending: INTERNAL MEDICINE
Payer: COMMERCIAL

## 2017-10-30 ENCOUNTER — APPOINTMENT (OUTPATIENT)
Dept: CARDIAC REHAB | Facility: HOSPITAL | Age: 77
End: 2017-10-30
Attending: INTERNAL MEDICINE
Payer: COMMERCIAL

## 2017-10-30 PROCEDURE — 93798 PHYS/QHP OP CAR RHAB W/ECG: CPT

## 2017-11-01 ENCOUNTER — APPOINTMENT (OUTPATIENT)
Dept: CARDIAC REHAB | Facility: HOSPITAL | Age: 77
End: 2017-11-01
Attending: INTERNAL MEDICINE
Payer: COMMERCIAL

## 2017-11-01 PROCEDURE — 93798 PHYS/QHP OP CAR RHAB W/ECG: CPT

## 2017-11-03 ENCOUNTER — CARDPULM VISIT (OUTPATIENT)
Dept: CARDIAC REHAB | Facility: HOSPITAL | Age: 77
End: 2017-11-03
Attending: INTERNAL MEDICINE
Payer: COMMERCIAL

## 2017-11-03 PROCEDURE — 93798 PHYS/QHP OP CAR RHAB W/ECG: CPT

## 2017-11-06 ENCOUNTER — CARDPULM VISIT (OUTPATIENT)
Dept: CARDIAC REHAB | Facility: HOSPITAL | Age: 77
End: 2017-11-06
Attending: INTERNAL MEDICINE
Payer: COMMERCIAL

## 2017-11-06 PROCEDURE — 93798 PHYS/QHP OP CAR RHAB W/ECG: CPT

## 2017-11-08 ENCOUNTER — CARDPULM VISIT (OUTPATIENT)
Dept: CARDIAC REHAB | Facility: HOSPITAL | Age: 77
End: 2017-11-08
Attending: INTERNAL MEDICINE
Payer: COMMERCIAL

## 2017-11-08 PROCEDURE — 93798 PHYS/QHP OP CAR RHAB W/ECG: CPT

## 2017-11-10 ENCOUNTER — CARDPULM VISIT (OUTPATIENT)
Dept: CARDIAC REHAB | Facility: HOSPITAL | Age: 77
End: 2017-11-10
Attending: INTERNAL MEDICINE
Payer: COMMERCIAL

## 2017-11-13 ENCOUNTER — CARDPULM VISIT (OUTPATIENT)
Dept: CARDIAC REHAB | Facility: HOSPITAL | Age: 77
End: 2017-11-13
Attending: INTERNAL MEDICINE
Payer: COMMERCIAL

## 2017-11-13 PROCEDURE — 93798 PHYS/QHP OP CAR RHAB W/ECG: CPT

## 2017-11-15 ENCOUNTER — APPOINTMENT (OUTPATIENT)
Dept: CARDIAC REHAB | Facility: HOSPITAL | Age: 77
End: 2017-11-15
Attending: INTERNAL MEDICINE
Payer: COMMERCIAL

## 2017-11-15 PROCEDURE — 93798 PHYS/QHP OP CAR RHAB W/ECG: CPT

## 2017-11-17 ENCOUNTER — CARDPULM VISIT (OUTPATIENT)
Dept: CARDIAC REHAB | Facility: HOSPITAL | Age: 77
End: 2017-11-17
Attending: INTERNAL MEDICINE
Payer: COMMERCIAL

## 2017-11-17 PROCEDURE — 93798 PHYS/QHP OP CAR RHAB W/ECG: CPT

## 2017-11-24 ENCOUNTER — APPOINTMENT (OUTPATIENT)
Dept: CARDIAC REHAB | Facility: HOSPITAL | Age: 77
End: 2017-11-24
Attending: INTERNAL MEDICINE
Payer: COMMERCIAL

## 2017-11-27 ENCOUNTER — CARDPULM VISIT (OUTPATIENT)
Dept: CARDIAC REHAB | Facility: HOSPITAL | Age: 77
End: 2017-11-27
Attending: INTERNAL MEDICINE
Payer: COMMERCIAL

## 2017-11-27 PROCEDURE — 93798 PHYS/QHP OP CAR RHAB W/ECG: CPT

## 2017-11-29 ENCOUNTER — CARDPULM VISIT (OUTPATIENT)
Dept: CARDIAC REHAB | Facility: HOSPITAL | Age: 77
End: 2017-11-29
Attending: INTERNAL MEDICINE
Payer: COMMERCIAL

## 2017-11-29 PROCEDURE — 93798 PHYS/QHP OP CAR RHAB W/ECG: CPT

## 2017-12-01 ENCOUNTER — CARDPULM VISIT (OUTPATIENT)
Dept: CARDIAC REHAB | Facility: HOSPITAL | Age: 77
End: 2017-12-01
Attending: INTERNAL MEDICINE
Payer: COMMERCIAL

## 2017-12-01 PROCEDURE — 93798 PHYS/QHP OP CAR RHAB W/ECG: CPT

## 2017-12-04 ENCOUNTER — CARDPULM VISIT (OUTPATIENT)
Dept: CARDIAC REHAB | Facility: HOSPITAL | Age: 77
End: 2017-12-04
Attending: INTERNAL MEDICINE
Payer: COMMERCIAL

## 2017-12-04 PROCEDURE — 93798 PHYS/QHP OP CAR RHAB W/ECG: CPT

## 2017-12-06 ENCOUNTER — CARDPULM VISIT (OUTPATIENT)
Dept: CARDIAC REHAB | Facility: HOSPITAL | Age: 77
End: 2017-12-06
Attending: INTERNAL MEDICINE
Payer: COMMERCIAL

## 2017-12-06 PROCEDURE — 93798 PHYS/QHP OP CAR RHAB W/ECG: CPT

## 2017-12-08 ENCOUNTER — CARDPULM VISIT (OUTPATIENT)
Dept: CARDIAC REHAB | Facility: HOSPITAL | Age: 77
End: 2017-12-08
Attending: INTERNAL MEDICINE
Payer: COMMERCIAL

## 2017-12-08 PROCEDURE — 93798 PHYS/QHP OP CAR RHAB W/ECG: CPT

## 2017-12-11 ENCOUNTER — CARDPULM VISIT (OUTPATIENT)
Dept: CARDIAC REHAB | Facility: HOSPITAL | Age: 77
End: 2017-12-11
Attending: INTERNAL MEDICINE
Payer: COMMERCIAL

## 2017-12-11 PROCEDURE — 93798 PHYS/QHP OP CAR RHAB W/ECG: CPT

## 2017-12-13 ENCOUNTER — CARDPULM VISIT (OUTPATIENT)
Dept: CARDIAC REHAB | Facility: HOSPITAL | Age: 77
End: 2017-12-13
Attending: INTERNAL MEDICINE
Payer: COMMERCIAL

## 2017-12-13 PROCEDURE — 93798 PHYS/QHP OP CAR RHAB W/ECG: CPT

## 2017-12-15 ENCOUNTER — APPOINTMENT (OUTPATIENT)
Dept: CARDIAC REHAB | Facility: HOSPITAL | Age: 77
End: 2017-12-15
Attending: INTERNAL MEDICINE
Payer: COMMERCIAL

## 2017-12-15 ENCOUNTER — HOSPITAL ENCOUNTER (OUTPATIENT)
Dept: CV DIAGNOSTICS | Facility: HOSPITAL | Age: 77
Discharge: HOME | End: 2017-12-15
Attending: INTERNAL MEDICINE
Payer: COMMERCIAL

## 2017-12-15 DIAGNOSIS — Z95.1 POSTSURGICAL AORTOCORONARY BYPASS STATUS: ICD-10-CM

## 2017-12-15 PROCEDURE — 93018 CV STRESS TEST I&R ONLY: CPT | Performed by: INTERNAL MEDICINE

## 2017-12-15 PROCEDURE — 93017 CV STRESS TEST TRACING ONLY: CPT | Performed by: INTERNAL MEDICINE

## 2017-12-18 ENCOUNTER — PRIOR ORIGINAL RECORDS (OUTPATIENT)
Dept: OTHER | Age: 77
End: 2017-12-18

## 2017-12-18 ENCOUNTER — CARDPULM VISIT (OUTPATIENT)
Dept: CARDIAC REHAB | Facility: HOSPITAL | Age: 77
End: 2017-12-18
Attending: INTERNAL MEDICINE
Payer: COMMERCIAL

## 2017-12-18 PROCEDURE — 93798 PHYS/QHP OP CAR RHAB W/ECG: CPT

## 2017-12-20 ENCOUNTER — CARDPULM VISIT (OUTPATIENT)
Dept: CARDIAC REHAB | Facility: HOSPITAL | Age: 77
End: 2017-12-20
Attending: INTERNAL MEDICINE
Payer: COMMERCIAL

## 2017-12-20 PROCEDURE — 93798 PHYS/QHP OP CAR RHAB W/ECG: CPT

## 2017-12-27 ENCOUNTER — CARDPULM VISIT (OUTPATIENT)
Dept: CARDIAC REHAB | Facility: HOSPITAL | Age: 77
End: 2017-12-27
Attending: INTERNAL MEDICINE
Payer: COMMERCIAL

## 2017-12-27 PROCEDURE — 93798 PHYS/QHP OP CAR RHAB W/ECG: CPT

## 2018-01-02 ENCOUNTER — MED REC SCAN ONLY (OUTPATIENT)
Dept: INTERNAL MEDICINE CLINIC | Facility: CLINIC | Age: 78
End: 2018-01-02

## 2018-01-02 ENCOUNTER — PRIOR ORIGINAL RECORDS (OUTPATIENT)
Dept: OTHER | Age: 78
End: 2018-01-02

## 2018-01-05 ENCOUNTER — CARDPULM VISIT (OUTPATIENT)
Dept: CARDIAC REHAB | Facility: HOSPITAL | Age: 78
End: 2018-01-05
Attending: INTERNAL MEDICINE
Payer: COMMERCIAL

## 2018-01-05 PROCEDURE — 93798 PHYS/QHP OP CAR RHAB W/ECG: CPT

## 2018-01-08 ENCOUNTER — CARDPULM VISIT (OUTPATIENT)
Dept: CARDIAC REHAB | Facility: HOSPITAL | Age: 78
End: 2018-01-08
Attending: INTERNAL MEDICINE
Payer: COMMERCIAL

## 2018-01-08 PROCEDURE — 93798 PHYS/QHP OP CAR RHAB W/ECG: CPT

## 2018-01-10 ENCOUNTER — CARDPULM VISIT (OUTPATIENT)
Dept: CARDIAC REHAB | Facility: HOSPITAL | Age: 78
End: 2018-01-10
Attending: INTERNAL MEDICINE
Payer: COMMERCIAL

## 2018-01-10 PROCEDURE — 93798 PHYS/QHP OP CAR RHAB W/ECG: CPT

## 2018-01-12 ENCOUNTER — CARDPULM VISIT (OUTPATIENT)
Dept: CARDIAC REHAB | Facility: HOSPITAL | Age: 78
End: 2018-01-12
Attending: INTERNAL MEDICINE
Payer: COMMERCIAL

## 2018-01-12 PROCEDURE — 93798 PHYS/QHP OP CAR RHAB W/ECG: CPT

## 2018-01-17 ENCOUNTER — CARDPULM VISIT (OUTPATIENT)
Dept: CARDIAC REHAB | Facility: HOSPITAL | Age: 78
End: 2018-01-17
Attending: INTERNAL MEDICINE
Payer: COMMERCIAL

## 2018-01-17 PROCEDURE — 93798 PHYS/QHP OP CAR RHAB W/ECG: CPT

## 2018-01-19 ENCOUNTER — CARDPULM VISIT (OUTPATIENT)
Dept: CARDIAC REHAB | Facility: HOSPITAL | Age: 78
End: 2018-01-19
Attending: INTERNAL MEDICINE
Payer: COMMERCIAL

## 2018-01-19 PROCEDURE — 93798 PHYS/QHP OP CAR RHAB W/ECG: CPT

## 2018-01-22 ENCOUNTER — CARDPULM VISIT (OUTPATIENT)
Dept: CARDIAC REHAB | Facility: HOSPITAL | Age: 78
End: 2018-01-22
Attending: INTERNAL MEDICINE
Payer: COMMERCIAL

## 2018-01-22 PROCEDURE — 93798 PHYS/QHP OP CAR RHAB W/ECG: CPT

## 2018-01-24 ENCOUNTER — CARDPULM VISIT (OUTPATIENT)
Dept: CARDIAC REHAB | Facility: HOSPITAL | Age: 78
End: 2018-01-24
Attending: INTERNAL MEDICINE
Payer: COMMERCIAL

## 2018-01-24 PROCEDURE — 93798 PHYS/QHP OP CAR RHAB W/ECG: CPT

## 2018-03-23 RX ORDER — ALFUZOSIN HYDROCHLORIDE 10 MG/1
TABLET, EXTENDED RELEASE ORAL
Qty: 30 TABLET | Refills: 0 | Status: SHIPPED | OUTPATIENT
Start: 2018-03-23 | End: 2018-05-05

## 2018-03-23 NOTE — TELEPHONE ENCOUNTER
LOV: 9/8/17  Future office visit: No upcoming visit   Last labs: 9/8/17 Cbc Cmp Potassium  Last RX: 10/3/17 #90 #1 Refill  Per protocol: Route to provider

## 2018-05-01 ENCOUNTER — TELEPHONE (OUTPATIENT)
Dept: INTERNAL MEDICINE CLINIC | Facility: CLINIC | Age: 78
End: 2018-05-01

## 2018-05-02 NOTE — TELEPHONE ENCOUNTER
Lala from [facility] called and asked if we had received the General Health Assessment form that either BERHANE or SD can fill out. I advised her that we have only received Medical Records Request and that it was being sent to [employer].      She advised that it may

## 2018-05-03 ENCOUNTER — TELEPHONE (OUTPATIENT)
Dept: INTERNAL MEDICINE CLINIC | Facility: CLINIC | Age: 78
End: 2018-05-03

## 2018-05-03 NOTE — TELEPHONE ENCOUNTER
Received fax from [facility] requesting general health assessment be completed and faxed back to [phone]  Called patient as last seen in Sept 2017 post CABG, pt will need to be seen. Called pt form is for life insurance that his wife is looking into.   Pt s

## 2018-05-04 NOTE — TELEPHONE ENCOUNTER
Fernandez White RN          5/3/18 1:20 PM   Note      Received fax from [facility] requesting general health assessment be completed and faxed back to [phone]  Called patient as last seen in Sept 2017 post CABG, pt will need to be seen.   Called pt form is f

## 2018-05-07 RX ORDER — ALFUZOSIN HYDROCHLORIDE 10 MG/1
TABLET, EXTENDED RELEASE ORAL
Qty: 90 TABLET | Refills: 0 | Status: SHIPPED | OUTPATIENT
Start: 2018-05-07 | End: 2018-07-28

## 2018-05-07 NOTE — TELEPHONE ENCOUNTER
LOV: 3/6/17 SD CPE 9/8/17 SD  Future office visit: 5/8/18   Last labs: 9/8/17 Cbc Cmp   Last RX: 3/23/18 #30 No Refill  Per protocol: Route to provider

## 2018-05-08 ENCOUNTER — OFFICE VISIT (OUTPATIENT)
Dept: INTERNAL MEDICINE CLINIC | Facility: CLINIC | Age: 78
End: 2018-05-08

## 2018-05-08 ENCOUNTER — PRIOR ORIGINAL RECORDS (OUTPATIENT)
Dept: OTHER | Age: 78
End: 2018-05-08

## 2018-05-08 ENCOUNTER — LAB ENCOUNTER (OUTPATIENT)
Dept: LAB | Age: 78
End: 2018-05-08
Attending: NURSE PRACTITIONER
Payer: COMMERCIAL

## 2018-05-08 VITALS
BODY MASS INDEX: 27.99 KG/M2 | HEIGHT: 69 IN | DIASTOLIC BLOOD PRESSURE: 60 MMHG | WEIGHT: 189 LBS | TEMPERATURE: 98 F | SYSTOLIC BLOOD PRESSURE: 108 MMHG | HEART RATE: 72 BPM

## 2018-05-08 DIAGNOSIS — I25.2 HISTORY OF MYOCARDIAL INFARCTION: ICD-10-CM

## 2018-05-08 DIAGNOSIS — Z95.1 S/P CABG (CORONARY ARTERY BYPASS GRAFT): ICD-10-CM

## 2018-05-08 DIAGNOSIS — R73.9 HYPERGLYCEMIA: ICD-10-CM

## 2018-05-08 DIAGNOSIS — Z86.010 HISTORY OF ADENOMATOUS POLYP OF COLON: ICD-10-CM

## 2018-05-08 DIAGNOSIS — R73.9 HYPERGLYCEMIA: Primary | ICD-10-CM

## 2018-05-08 DIAGNOSIS — I25.10 CORONARY ARTERY DISEASE INVOLVING NATIVE CORONARY ARTERY OF NATIVE HEART WITHOUT ANGINA PECTORIS: ICD-10-CM

## 2018-05-08 DIAGNOSIS — Z12.11 SCREEN FOR COLON CANCER: ICD-10-CM

## 2018-05-08 DIAGNOSIS — E78.5 DYSLIPIDEMIA: ICD-10-CM

## 2018-05-08 DIAGNOSIS — N40.0 BENIGN PROSTATIC HYPERPLASIA, UNSPECIFIED WHETHER LOWER URINARY TRACT SYMPTOMS PRESENT: ICD-10-CM

## 2018-05-08 DIAGNOSIS — Z00.00 ROUTINE GENERAL MEDICAL EXAMINATION AT A HEALTH CARE FACILITY: Primary | ICD-10-CM

## 2018-05-08 PROBLEM — R73.03 PREDIABETES: Status: ACTIVE | Noted: 2018-05-08

## 2018-05-08 PROCEDURE — 80061 LIPID PANEL: CPT | Performed by: NURSE PRACTITIONER

## 2018-05-08 PROCEDURE — 80050 GENERAL HEALTH PANEL: CPT | Performed by: NURSE PRACTITIONER

## 2018-05-08 PROCEDURE — 83036 HEMOGLOBIN GLYCOSYLATED A1C: CPT | Performed by: NURSE PRACTITIONER

## 2018-05-08 PROCEDURE — 99397 PER PM REEVAL EST PAT 65+ YR: CPT | Performed by: NURSE PRACTITIONER

## 2018-05-08 NOTE — PROGRESS NOTES
Leobardo Aldana is a 65year old male who presents for a complete physical exam.     HPI:   Pt complains of   He is doing well. Some constipation   Will trial benefiber daily.    Due for colonoscopy with Dr Lety Finnegan     s/p CABG last summer  Follows with  • High blood pressure    • High cholesterol    • Other and unspecified hyperlipidemia    • Unspecified essential hypertension       Past Surgical History:  No date: ANGIOGRAM  No date: BYPASS SURGERY  No date: CATARACT  No date: COLONOSCOPY  No date: HE Position: Sitting, Cuff Size: adult)   Pulse 72   Temp 98.4 °F (36.9 °C) (Oral)   Ht 69\"   Wt 189 lb   BMI 27.91 kg/m²   Body mass index is 27.91 kg/m².    GENERAL: well developed, well nourished,in no apparent distress  SKIN: no rashes,no suspicious lesio

## 2018-05-17 ENCOUNTER — PRIOR ORIGINAL RECORDS (OUTPATIENT)
Dept: OTHER | Age: 78
End: 2018-05-17

## 2018-06-07 LAB
ALBUMIN: 3.7 G/DL
ALKALINE PHOSPHATATE(ALK PHOS): 65 IU/L
BILIRUBIN TOTAL: 0.7 MG/DL
BUN: 24 MG/DL
CALCIUM: 9.2 MG/DL
CHLORIDE: 111 MEQ/L
CHOLESTEROL, TOTAL: 173 MG/DL
CREATININE, SERUM: 1.2 MG/DL
GLUCOSE: 111 MG/DL
HDL CHOLESTEROL: 51 MG/DL
HEMATOCRIT: 48.4 %
HEMOGLOBIN A1C: 6.1 %
HEMOGLOBIN: 15.4 G/DL
LDL CHOLESTEROL: 102 MG/DL
PLATELETS: 211 K/UL
POTASSIUM, SERUM: 4.9 MEQ/L
PROTEIN, TOTAL: 7.1 G/DL
RED BLOOD COUNT: 5.01 X 10-6/U
SGOT (AST): 24 IU/L
SGPT (ALT): 24 IU/L
SODIUM: 146 MEQ/L
THYROID STIMULATING HORMONE: 4.41 MLU/L
TRIGLYCERIDES: 98 MG/DL
WHITE BLOOD COUNT: 5.5 X 10-3/U

## 2018-06-25 ENCOUNTER — PRIOR ORIGINAL RECORDS (OUTPATIENT)
Dept: OTHER | Age: 78
End: 2018-06-25

## 2018-06-25 ENCOUNTER — LAB ENCOUNTER (OUTPATIENT)
Dept: LAB | Age: 78
End: 2018-06-25
Attending: INTERNAL MEDICINE
Payer: COMMERCIAL

## 2018-06-25 DIAGNOSIS — E78.00 PURE HYPERCHOLESTEROLEMIA: Primary | ICD-10-CM

## 2018-06-25 PROCEDURE — 36415 COLL VENOUS BLD VENIPUNCTURE: CPT

## 2018-06-25 PROCEDURE — 80061 LIPID PANEL: CPT

## 2018-06-25 PROCEDURE — 80053 COMPREHEN METABOLIC PANEL: CPT

## 2018-06-26 ENCOUNTER — PRIOR ORIGINAL RECORDS (OUTPATIENT)
Dept: OTHER | Age: 78
End: 2018-06-26

## 2018-06-26 LAB
ALBUMIN: 3.4 G/DL
ALKALINE PHOSPHATATE(ALK PHOS): 61 IU/L
ALT (SGPT): 26 U/L
AST (SGOT): 19 U/L
BILIRUBIN TOTAL: 0.6 MG/DL
BUN: 27 MG/DL
CALCIUM: 8.7 MG/DL
CHLORIDE: 114 MEQ/L
CHOLESTEROL, TOTAL: 136 MG/DL
CREATININE, SERUM: 1.1 MG/DL
GLUCOSE: 128 MG/DL
GLUCOSE: 128 MG/DL
HDL CHOLESTEROL: 50 MG/DL
LDL CHOLESTEROL: 70 MG/DL
NON-HDL CHOLESTEROL: 86 MG/DL
POTASSIUM, SERUM: 4.1 MEQ/L
PROTEIN, TOTAL: 6.7 G/DL
SGOT (AST): 19 IU/L
SGPT (ALT): 26 IU/L
SODIUM: 145 MEQ/L
TRIGLYCERIDES: 80 MG/DL

## 2018-07-30 ENCOUNTER — MED REC SCAN ONLY (OUTPATIENT)
Dept: INTERNAL MEDICINE CLINIC | Facility: CLINIC | Age: 78
End: 2018-07-30

## 2018-07-30 RX ORDER — ALFUZOSIN HYDROCHLORIDE 10 MG/1
TABLET, EXTENDED RELEASE ORAL
Qty: 90 TABLET | Refills: 1 | Status: SHIPPED | OUTPATIENT
Start: 2018-07-30 | End: 2019-01-19

## 2019-01-15 ENCOUNTER — MYAURORA ACCOUNT LINK (OUTPATIENT)
Dept: OTHER | Age: 79
End: 2019-01-15

## 2019-01-15 ENCOUNTER — PRIOR ORIGINAL RECORDS (OUTPATIENT)
Dept: OTHER | Age: 79
End: 2019-01-15

## 2019-01-19 NOTE — TELEPHONE ENCOUNTER
LOV:5/8/18 SD  FOV:none on file   LAST RX:7/30/18 10 mg take 1 tab with breakfast 90 tabs 1 refill   LAST LABS:6/25/18 lipids,cmp  PER PROTOCOL: to provider

## 2019-01-20 RX ORDER — ALFUZOSIN HYDROCHLORIDE 10 MG/1
TABLET, EXTENDED RELEASE ORAL
Qty: 30 TABLET | Refills: 0 | Status: SHIPPED | OUTPATIENT
Start: 2019-01-20 | End: 2019-03-08

## 2019-02-28 VITALS
SYSTOLIC BLOOD PRESSURE: 120 MMHG | BODY MASS INDEX: 28.58 KG/M2 | HEIGHT: 69 IN | WEIGHT: 193 LBS | HEART RATE: 63 BPM | DIASTOLIC BLOOD PRESSURE: 68 MMHG

## 2019-02-28 VITALS
WEIGHT: 189 LBS | BODY MASS INDEX: 27.99 KG/M2 | HEART RATE: 68 BPM | SYSTOLIC BLOOD PRESSURE: 118 MMHG | HEIGHT: 69 IN | DIASTOLIC BLOOD PRESSURE: 62 MMHG

## 2019-02-28 VITALS
WEIGHT: 184 LBS | HEIGHT: 69 IN | DIASTOLIC BLOOD PRESSURE: 60 MMHG | SYSTOLIC BLOOD PRESSURE: 118 MMHG | BODY MASS INDEX: 27.25 KG/M2 | HEART RATE: 76 BPM

## 2019-02-28 VITALS
HEART RATE: 64 BPM | BODY MASS INDEX: 26.96 KG/M2 | SYSTOLIC BLOOD PRESSURE: 116 MMHG | WEIGHT: 182 LBS | DIASTOLIC BLOOD PRESSURE: 60 MMHG | HEIGHT: 69 IN

## 2019-03-08 RX ORDER — ALFUZOSIN HYDROCHLORIDE 10 MG/1
10 TABLET, EXTENDED RELEASE ORAL DAILY
Qty: 30 TABLET | Refills: 0 | Status: SHIPPED | OUTPATIENT
Start: 2019-03-08 | End: 2019-04-04

## 2019-03-08 NOTE — TELEPHONE ENCOUNTER
LOV:5/8/18 SD  FOV:none on file   LAST RX:1/20/19 10 mg take 1 tab daily 30 tabs 0 refills   LAST LABS:6/25/18 lipids,cmp  PER PROTOCOL: to provider

## 2019-04-04 RX ORDER — ALFUZOSIN HYDROCHLORIDE 10 MG/1
TABLET, EXTENDED RELEASE ORAL
Qty: 90 TABLET | Refills: 0 | Status: SHIPPED | OUTPATIENT
Start: 2019-04-04 | End: 2019-06-24

## 2019-04-04 NOTE — TELEPHONE ENCOUNTER
LOV: 5/8/18 w/ SD  FOV: None  Last labs: 6/25/18 LIPID,CMP  Last Refill: 3/8/19 qt:30    Per protocol routed to provider

## 2019-04-15 RX ORDER — CLOPIDOGREL BISULFATE 75 MG/1
TABLET ORAL
COMMUNITY
Start: 2018-10-01 | End: 2020-02-19 | Stop reason: SDUPTHER

## 2019-04-15 RX ORDER — ALFUZOSIN HYDROCHLORIDE 10 MG/1
TABLET, EXTENDED RELEASE ORAL
COMMUNITY
Start: 2019-01-15

## 2019-04-15 RX ORDER — METOPROLOL SUCCINATE 25 MG/1
TABLET, EXTENDED RELEASE ORAL
COMMUNITY
Start: 2019-01-15 | End: 2020-07-13 | Stop reason: DRUGHIGH

## 2019-04-15 RX ORDER — CHLORAL HYDRATE 500 MG
CAPSULE ORAL
COMMUNITY
Start: 2017-09-12 | End: 2020-07-13 | Stop reason: DRUGHIGH

## 2019-04-15 RX ORDER — ATORVASTATIN CALCIUM 20 MG/1
TABLET, FILM COATED ORAL
COMMUNITY
Start: 2019-01-15 | End: 2019-06-01 | Stop reason: SDUPTHER

## 2019-06-04 RX ORDER — ATORVASTATIN CALCIUM 20 MG/1
TABLET, FILM COATED ORAL
Qty: 30 TABLET | Refills: 0 | Status: SHIPPED | OUTPATIENT
Start: 2019-06-04 | End: 2019-07-05 | Stop reason: SDUPTHER

## 2019-06-10 ENCOUNTER — TELEPHONE (OUTPATIENT)
Dept: INTERNAL MEDICINE CLINIC | Facility: CLINIC | Age: 79
End: 2019-06-10

## 2019-06-10 DIAGNOSIS — Z13.228 SCREENING FOR METABOLIC DISORDER: ICD-10-CM

## 2019-06-10 DIAGNOSIS — Z13.29 SCREENING FOR THYROID DISORDER: ICD-10-CM

## 2019-06-10 DIAGNOSIS — Z13.0 SCREENING FOR DISORDER OF BLOOD AND BLOOD-FORMING ORGANS: ICD-10-CM

## 2019-06-10 DIAGNOSIS — Z00.00 ROUTINE GENERAL MEDICAL EXAMINATION AT A HEALTH CARE FACILITY: Primary | ICD-10-CM

## 2019-06-10 DIAGNOSIS — R73.03 PREDIABETES: ICD-10-CM

## 2019-06-10 DIAGNOSIS — Z13.220 SCREENING FOR LIPID DISORDERS: ICD-10-CM

## 2019-06-10 NOTE — TELEPHONE ENCOUNTER
Future Appointments   Date Time Provider Melanie Yeh   6/24/2019  4:30 PM CARLY De La Cruz [facility]     Orders to edward- Pt aware to fast-no call back required

## 2019-06-24 ENCOUNTER — OFFICE VISIT (OUTPATIENT)
Dept: INTERNAL MEDICINE CLINIC | Facility: CLINIC | Age: 79
End: 2019-06-24
Payer: COMMERCIAL

## 2019-06-24 ENCOUNTER — LAB ENCOUNTER (OUTPATIENT)
Dept: LAB | Facility: HOSPITAL | Age: 79
End: 2019-06-24
Attending: NURSE PRACTITIONER
Payer: COMMERCIAL

## 2019-06-24 VITALS
BODY MASS INDEX: 28.14 KG/M2 | WEIGHT: 190 LBS | TEMPERATURE: 98 F | HEART RATE: 68 BPM | DIASTOLIC BLOOD PRESSURE: 62 MMHG | SYSTOLIC BLOOD PRESSURE: 110 MMHG | HEIGHT: 69 IN

## 2019-06-24 DIAGNOSIS — Z13.0 SCREENING FOR DISORDER OF BLOOD AND BLOOD-FORMING ORGANS: ICD-10-CM

## 2019-06-24 DIAGNOSIS — Z13.29 SCREENING FOR THYROID DISORDER: ICD-10-CM

## 2019-06-24 DIAGNOSIS — Z95.1 S/P CABG (CORONARY ARTERY BYPASS GRAFT): ICD-10-CM

## 2019-06-24 DIAGNOSIS — H61.23 BILATERAL IMPACTED CERUMEN: ICD-10-CM

## 2019-06-24 DIAGNOSIS — N40.0 BENIGN PROSTATIC HYPERPLASIA, UNSPECIFIED WHETHER LOWER URINARY TRACT SYMPTOMS PRESENT: ICD-10-CM

## 2019-06-24 DIAGNOSIS — H91.93 BILATERAL HEARING LOSS, UNSPECIFIED HEARING LOSS TYPE: ICD-10-CM

## 2019-06-24 DIAGNOSIS — I25.10 CORONARY ARTERY DISEASE INVOLVING NATIVE CORONARY ARTERY OF NATIVE HEART WITHOUT ANGINA PECTORIS: Primary | ICD-10-CM

## 2019-06-24 DIAGNOSIS — R35.1 NOCTURIA: ICD-10-CM

## 2019-06-24 DIAGNOSIS — Z00.00 ROUTINE GENERAL MEDICAL EXAMINATION AT A HEALTH CARE FACILITY: ICD-10-CM

## 2019-06-24 DIAGNOSIS — E55.9 VITAMIN D DEFICIENCY: ICD-10-CM

## 2019-06-24 DIAGNOSIS — N43.3 HYDROCELE, UNSPECIFIED HYDROCELE TYPE: ICD-10-CM

## 2019-06-24 DIAGNOSIS — R73.03 PREDIABETES: ICD-10-CM

## 2019-06-24 DIAGNOSIS — Z12.11 COLON CANCER SCREENING: ICD-10-CM

## 2019-06-24 DIAGNOSIS — N52.9 ERECTILE DYSFUNCTION, UNSPECIFIED ERECTILE DYSFUNCTION TYPE: ICD-10-CM

## 2019-06-24 DIAGNOSIS — R73.9 HYPERGLYCEMIA: ICD-10-CM

## 2019-06-24 DIAGNOSIS — Z13.228 SCREENING FOR METABOLIC DISORDER: ICD-10-CM

## 2019-06-24 DIAGNOSIS — E78.5 DYSLIPIDEMIA: ICD-10-CM

## 2019-06-24 DIAGNOSIS — Z13.220 SCREENING FOR LIPID DISORDERS: ICD-10-CM

## 2019-06-24 DIAGNOSIS — I25.2 HISTORY OF MYOCARDIAL INFARCTION: ICD-10-CM

## 2019-06-24 LAB
ABSOLUTE IMMATURE GRANULOCYTES (OFFPRE24): NORMAL
ALBUMIN SERPL-MCNC: 3.8 G/DL
ALBUMIN/GLOB SERPL: NORMAL {RATIO}
ALP SERPL-CCNC: 74 U/L
ALT SERPL-CCNC: 28 U/L
ANION GAP SERPL CALC-SCNC: NORMAL MMOL/L
AST SERPL-CCNC: 24 U/L
BASO+EOS+MONOS # BLD: NORMAL 10*3/UL
BASO+EOS+MONOS NFR BLD: NORMAL %
BASOPHILS # BLD: NORMAL 10*3/UL
BASOPHILS NFR BLD: NORMAL %
BILIRUB SERPL-MCNC: 1 MG/DL
BUN SERPL-MCNC: 22 MG/DL
BUN/CREAT SERPL: NORMAL
CALCIUM SERPL-MCNC: 8.8 MG/DL
CHLORIDE SERPL-SCNC: 110 MMOL/L
CHOLEST SERPL-MCNC: 123 MG/DL
CHOLEST/HDLC SERPL: NORMAL {RATIO}
CO2 SERPL-SCNC: NORMAL MMOL/L
CREAT SERPL-MCNC: 1.17 MG/DL
DIFFERENTIAL METHOD BLD: NORMAL
EOSINOPHIL # BLD: NORMAL 10*3/UL
EOSINOPHIL NFR BLD: NORMAL %
ERYTHROCYTE [DISTWIDTH] IN BLOOD: NORMAL %
EST. AVERAGE GLUCOSE BLD GHB EST-MCNC: NORMAL MG/DL
GLOBULIN SER-MCNC: 3.4 G/DL
GLUCOSE SERPL-MCNC: 106 MG/DL
HBA1C MFR BLD: 6.3 %
HBA1C MFR BLD: NORMAL % (ref 4.5–5.6)
HCT VFR BLD CALC: 46.9 %
HDLC SERPL-MCNC: 47 MG/DL
HGB BLD-MCNC: 15.5 G/DL
IMMATURE GRANULOCYTES (OFFPRE25): NORMAL
LDLC SERPL CALC-MCNC: 54 MG/DL
LENGTH OF FAST TIME PATIENT: NORMAL H
LENGTH OF FAST TIME PATIENT: NORMAL H
LYMPHOCYTES # BLD: NORMAL 10*3/UL
LYMPHOCYTES NFR BLD: NORMAL %
MCH RBC QN AUTO: NORMAL PG
MCHC RBC AUTO-ENTMCNC: NORMAL G/DL
MCV RBC AUTO: NORMAL FL
MONOCYTES # BLD: NORMAL 10*3/UL
MONOCYTES NFR BLD: NORMAL %
MPV (OFFPRE2): NORMAL
NEUTROPHILS # BLD: NORMAL 10*3/UL
NEUTROPHILS NFR BLD: NORMAL %
NONHDLC SERPL-MCNC: NORMAL MG/DL
NRBC BLD MANUAL-RTO: NORMAL %
PLAT MORPH BLD: NORMAL
PLATELET # BLD: 197 10*3/UL
POTASSIUM SERPL-SCNC: 4 MMOL/L
PROT SERPL-MCNC: 7.2 G/DL
RBC # BLD: 4.96 10*6/UL
RBC MORPH BLD: NORMAL
SODIUM SERPL-SCNC: 144 MMOL/L
T4 FREE SERPL-MCNC: 0.9 NG/DL
TRIGL SERPL-MCNC: 110 MG/DL
TSH SERPL-ACNC: 4.56 M[IU]/L
VLDLC SERPL CALC-MCNC: NORMAL MG/DL
WBC # BLD: 8.1 10*3/UL
WBC MORPH BLD: NORMAL

## 2019-06-24 PROCEDURE — 80061 LIPID PANEL: CPT

## 2019-06-24 PROCEDURE — 36415 COLL VENOUS BLD VENIPUNCTURE: CPT

## 2019-06-24 PROCEDURE — 85025 COMPLETE CBC W/AUTO DIFF WBC: CPT

## 2019-06-24 PROCEDURE — 90471 IMMUNIZATION ADMIN: CPT | Performed by: NURSE PRACTITIONER

## 2019-06-24 PROCEDURE — 69210 REMOVE IMPACTED EAR WAX UNI: CPT | Performed by: NURSE PRACTITIONER

## 2019-06-24 PROCEDURE — 84443 ASSAY THYROID STIM HORMONE: CPT

## 2019-06-24 PROCEDURE — 84439 ASSAY OF FREE THYROXINE: CPT

## 2019-06-24 PROCEDURE — 80053 COMPREHEN METABOLIC PANEL: CPT

## 2019-06-24 PROCEDURE — 99397 PER PM REEVAL EST PAT 65+ YR: CPT | Performed by: NURSE PRACTITIONER

## 2019-06-24 PROCEDURE — 90750 HZV VACC RECOMBINANT IM: CPT | Performed by: NURSE PRACTITIONER

## 2019-06-24 PROCEDURE — 83036 HEMOGLOBIN GLYCOSYLATED A1C: CPT

## 2019-06-24 RX ORDER — ATORVASTATIN CALCIUM 20 MG/1
20 TABLET, FILM COATED ORAL NIGHTLY
Refills: 0 | COMMUNITY
Start: 2019-06-04 | End: 2021-02-13

## 2019-06-24 RX ORDER — ALFUZOSIN HYDROCHLORIDE 10 MG/1
10 TABLET, EXTENDED RELEASE ORAL
Qty: 90 TABLET | Refills: 3 | Status: SHIPPED | OUTPATIENT
Start: 2019-06-24 | End: 2020-08-10

## 2019-06-24 RX ORDER — ALFUZOSIN HYDROCHLORIDE 10 MG/1
TABLET, EXTENDED RELEASE ORAL
Qty: 30 TABLET | Refills: 0 | Status: SHIPPED | OUTPATIENT
Start: 2019-06-24 | End: 2019-06-24

## 2019-06-24 RX ORDER — METOPROLOL SUCCINATE 50 MG/1
50 TABLET, EXTENDED RELEASE ORAL DAILY
Qty: 90 TABLET | Refills: 3 | Status: SHIPPED | OUTPATIENT
Start: 2019-06-24 | End: 2020-11-09

## 2019-06-24 NOTE — TELEPHONE ENCOUNTER
LOV:5/8/18 SD  FOV:6/24/19   LAST RX:4/4/19 10 mg take 1 tab daily 90 tabs 0 refills   LAST LABS:6/25/18 lipids,cmp   PER PROTOCOL: to provider

## 2019-06-24 NOTE — PROGRESS NOTES
Denae Gonzalez is a 77year old male who presents for a complete physical exam.     HPI:   Pt complains of . CAD s/p CABG  Dr Eleanor House, statin, plavix  toprol XL    Hydrocele. More bothersome  Nocturia.   Requesting urology referral    History of inguinal High blood pressure    • High cholesterol    • Other and unspecified hyperlipidemia    • Unspecified essential hypertension       Past Surgical History:   Procedure Laterality Date   • ANGIOGRAM     • BYPASS SURGERY     • CATARACT     • COLONOSCOPY     • H denies abdominal pain,denies heartburn  : denies nocturia or changes in stream  MUSCULOSKELETAL: as above   NEURO: denies headaches  PSYCH: denies depression or anxiety      EXAM:   /62 (BP Location: Left arm, Patient Position: Sitting, Cuff Size: infarction  Vitamin d deficiency  Cont supplement.    Benign prostatic hyperplasia, unspecified whether lower urinary tract symptoms present   Requesting to see urology  Dyslipidemia  Statin  Due for lipidl     Erectile dysfunction, unspecified erectile dys

## 2019-07-05 RX ORDER — ATORVASTATIN CALCIUM 20 MG/1
TABLET, FILM COATED ORAL
Qty: 30 TABLET | Refills: 4 | Status: SHIPPED | OUTPATIENT
Start: 2019-07-05 | End: 2019-11-20 | Stop reason: SDUPTHER

## 2019-07-08 ENCOUNTER — CLINICAL ABSTRACT (OUTPATIENT)
Dept: CARDIOLOGY | Age: 79
End: 2019-07-08

## 2019-10-03 SDOH — HEALTH STABILITY: MENTAL HEALTH: HOW OFTEN DO YOU HAVE A DRINK CONTAINING ALCOHOL?: NEVER

## 2019-10-08 ENCOUNTER — OFFICE VISIT (OUTPATIENT)
Dept: CARDIOLOGY | Age: 79
End: 2019-10-08

## 2019-10-08 VITALS
SYSTOLIC BLOOD PRESSURE: 124 MMHG | HEART RATE: 66 BPM | HEIGHT: 69 IN | WEIGHT: 197.9 LBS | DIASTOLIC BLOOD PRESSURE: 62 MMHG | BODY MASS INDEX: 29.31 KG/M2

## 2019-10-08 DIAGNOSIS — E78.00 PURE HYPERCHOLESTEROLEMIA: ICD-10-CM

## 2019-10-08 DIAGNOSIS — Z95.1 HX OF CABG: ICD-10-CM

## 2019-10-08 DIAGNOSIS — I25.10 CORONARY ARTERY DISEASE INVOLVING NATIVE CORONARY ARTERY OF NATIVE HEART WITHOUT ANGINA PECTORIS: Primary | ICD-10-CM

## 2019-10-08 DIAGNOSIS — R73.9 HYPERGLYCEMIA: ICD-10-CM

## 2019-10-08 PROCEDURE — 99215 OFFICE O/P EST HI 40 MIN: CPT | Performed by: INTERNAL MEDICINE

## 2019-10-08 SDOH — HEALTH STABILITY: MENTAL HEALTH: HOW OFTEN DO YOU HAVE A DRINK CONTAINING ALCOHOL?: NEVER

## 2019-10-08 ASSESSMENT — PATIENT HEALTH QUESTIONNAIRE - PHQ9
SUM OF ALL RESPONSES TO PHQ9 QUESTIONS 1 AND 2: 0
SUM OF ALL RESPONSES TO PHQ9 QUESTIONS 1 AND 2: 0
2. FEELING DOWN, DEPRESSED OR HOPELESS: NOT AT ALL
1. LITTLE INTEREST OR PLEASURE IN DOING THINGS: NOT AT ALL

## 2019-10-10 ENCOUNTER — MED REC SCAN ONLY (OUTPATIENT)
Dept: INTERNAL MEDICINE CLINIC | Facility: CLINIC | Age: 79
End: 2019-10-10

## 2019-11-20 RX ORDER — ATORVASTATIN CALCIUM 20 MG/1
TABLET, FILM COATED ORAL
Qty: 30 TABLET | Refills: 8 | Status: SHIPPED | OUTPATIENT
Start: 2019-11-20 | End: 2020-07-21

## 2020-01-09 ENCOUNTER — TELEPHONE (OUTPATIENT)
Dept: INTERNAL MEDICINE CLINIC | Facility: CLINIC | Age: 80
End: 2020-01-09

## 2020-01-28 NOTE — TELEPHONE ENCOUNTER
Spoke to the pt and he advised that he is not interested in getting the 2nd shingles vaccine     PERLITA

## 2020-02-19 RX ORDER — CLOPIDOGREL BISULFATE 75 MG/1
TABLET ORAL
Qty: 90 TABLET | Refills: 1 | Status: SHIPPED | OUTPATIENT
Start: 2020-02-19 | End: 2020-07-21

## 2020-07-13 ENCOUNTER — TELEPHONE (OUTPATIENT)
Dept: CARDIOLOGY | Age: 80
End: 2020-07-13

## 2020-07-13 ENCOUNTER — HOSPITAL ENCOUNTER (OUTPATIENT)
Dept: LAB | Facility: HOSPITAL | Age: 80
Discharge: HOME | End: 2020-07-13
Attending: INTERNAL MEDICINE
Payer: COMMERCIAL

## 2020-07-13 DIAGNOSIS — E78.00 PURE HYPERCHOLESTEROLEMIA: ICD-10-CM

## 2020-07-13 DIAGNOSIS — R73.9 HYPERGLYCEMIA: ICD-10-CM

## 2020-07-13 DIAGNOSIS — Z95.1 HX OF CABG: ICD-10-CM

## 2020-07-13 DIAGNOSIS — I25.10 CORONARY ARTERY DISEASE INVOLVING NATIVE CORONARY ARTERY OF NATIVE HEART WITHOUT ANGINA PECTORIS: Primary | ICD-10-CM

## 2020-07-13 LAB
ALBUMIN SERPL-MCNC: 3.7 G/DL (ref 3.4–5)
ALBUMIN/GLOB SERPL: 1.1 {RATIO} (ref 1–2)
ALP LIVER SERPL-CCNC: 69 U/L (ref 45–117)
ALT SERPL-CCNC: 27 U/L (ref 16–61)
ANION GAP SERPL CALC-SCNC: <0 MMOL/L (ref 0–18)
AST SERPL-CCNC: 21 U/L (ref 15–37)
BILIRUB SERPL-MCNC: 0.7 MG/DL (ref 0.1–2)
BUN BLD-MCNC: 29 MG/DL (ref 7–18)
BUN/CREAT SERPL: 22 (ref 10–20)
CALCIUM BLD-MCNC: 9.1 MG/DL (ref 8.5–10.1)
CHLORIDE SERPL-SCNC: 113 MMOL/L (ref 98–112)
CHOLEST SMN-MCNC: 122 MG/DL (ref ?–200)
CO2 SERPL-SCNC: 31 MMOL/L (ref 21–32)
CREAT BLD-MCNC: 1.32 MG/DL (ref 0.7–1.3)
EST. AVERAGE GLUCOSE BLD GHB EST-MCNC: 134 MG/DL (ref 68–126)
GLOBULIN PLAS-MCNC: 3.5 G/DL (ref 2.8–4.4)
GLUCOSE BLD-MCNC: 127 MG/DL (ref 70–99)
HBA1C MFR BLD HPLC: 6.3 % (ref ?–5.7)
HDLC SERPL-MCNC: 45 MG/DL (ref 40–59)
LDLC SERPL CALC-MCNC: 55 MG/DL (ref ?–100)
M PROTEIN MFR SERPL ELPH: 7.2 G/DL (ref 6.4–8.2)
NONHDLC SERPL-MCNC: 77 MG/DL (ref ?–130)
OSMOLALITY SERPL CALC.SUM OF ELEC: 301 MOSM/KG (ref 275–295)
PATIENT FASTING Y/N/NP: YES
PATIENT FASTING Y/N/NP: YES
POTASSIUM SERPL-SCNC: 5.1 MMOL/L (ref 3.5–5.1)
SODIUM SERPL-SCNC: 142 MMOL/L (ref 136–145)
TRIGL SERPL-MCNC: 112 MG/DL (ref 30–149)
VLDLC SERPL CALC-MCNC: 22 MG/DL (ref 0–30)

## 2020-07-13 PROCEDURE — 36415 COLL VENOUS BLD VENIPUNCTURE: CPT | Performed by: INTERNAL MEDICINE

## 2020-07-13 PROCEDURE — 83036 HEMOGLOBIN GLYCOSYLATED A1C: CPT | Performed by: INTERNAL MEDICINE

## 2020-07-13 PROCEDURE — 80061 LIPID PANEL: CPT | Performed by: INTERNAL MEDICINE

## 2020-07-13 PROCEDURE — 80053 COMPREHEN METABOLIC PANEL: CPT | Performed by: INTERNAL MEDICINE

## 2020-07-14 ENCOUNTER — OFFICE VISIT (OUTPATIENT)
Dept: CARDIOLOGY | Age: 80
End: 2020-07-14

## 2020-07-14 VITALS
WEIGHT: 190.2 LBS | HEIGHT: 69 IN | HEART RATE: 70 BPM | DIASTOLIC BLOOD PRESSURE: 60 MMHG | BODY MASS INDEX: 28.17 KG/M2 | SYSTOLIC BLOOD PRESSURE: 112 MMHG

## 2020-07-14 DIAGNOSIS — I25.10 CORONARY ARTERY DISEASE INVOLVING NATIVE CORONARY ARTERY OF NATIVE HEART WITHOUT ANGINA PECTORIS: ICD-10-CM

## 2020-07-14 DIAGNOSIS — Z95.1 HX OF CABG: ICD-10-CM

## 2020-07-14 DIAGNOSIS — E78.00 PURE HYPERCHOLESTEROLEMIA: ICD-10-CM

## 2020-07-14 DIAGNOSIS — R73.9 HYPERGLYCEMIA: Primary | ICD-10-CM

## 2020-07-14 PROCEDURE — 99215 OFFICE O/P EST HI 40 MIN: CPT | Performed by: INTERNAL MEDICINE

## 2020-07-14 RX ORDER — MULTIVITAMIN WITH IRON
250 TABLET ORAL DAILY
COMMUNITY

## 2020-07-14 RX ORDER — METOPROLOL SUCCINATE 50 MG/1
25 TABLET, EXTENDED RELEASE ORAL DAILY
COMMUNITY
Start: 2020-06-09

## 2020-07-14 RX ORDER — MULTIVIT-MIN/IRON/FOLIC ACID/K 18-600-40
CAPSULE ORAL DAILY
COMMUNITY

## 2020-07-14 SDOH — HEALTH STABILITY: MENTAL HEALTH: HOW OFTEN DO YOU HAVE A DRINK CONTAINING ALCOHOL?: MONTHLY OR LESS

## 2020-07-14 ASSESSMENT — PATIENT HEALTH QUESTIONNAIRE - PHQ9
2. FEELING DOWN, DEPRESSED OR HOPELESS: NOT AT ALL
CLINICAL INTERPRETATION OF PHQ2 SCORE: NO FURTHER SCREENING NEEDED
1. LITTLE INTEREST OR PLEASURE IN DOING THINGS: NOT AT ALL
SUM OF ALL RESPONSES TO PHQ9 QUESTIONS 1 AND 2: 0
SUM OF ALL RESPONSES TO PHQ9 QUESTIONS 1 AND 2: 0
CLINICAL INTERPRETATION OF PHQ9 SCORE: NO FURTHER SCREENING NEEDED

## 2020-07-21 RX ORDER — CLOPIDOGREL BISULFATE 75 MG/1
TABLET ORAL
Qty: 90 TABLET | Refills: 1 | Status: SHIPPED | OUTPATIENT
Start: 2020-07-21 | End: 2021-02-25

## 2020-07-21 RX ORDER — ATORVASTATIN CALCIUM 20 MG/1
TABLET, FILM COATED ORAL
Qty: 90 TABLET | Refills: 1 | Status: SHIPPED | OUTPATIENT
Start: 2020-07-21

## 2020-08-10 RX ORDER — ALFUZOSIN HYDROCHLORIDE 10 MG/1
TABLET, EXTENDED RELEASE ORAL
Qty: 90 TABLET | Refills: 0 | Status: SHIPPED | OUTPATIENT
Start: 2020-08-10 | End: 2020-11-09

## 2020-08-10 NOTE — TELEPHONE ENCOUNTER
Last Ov: 6/24/19, SD, CPE  Last labs: A1c, Lipid, CMP 7/13/20  Last Rx: alfuzosin ER 10mg, #90, 3R 6/24/19    No future appointments. Per Protocol - not on protocol, Rx pending.

## 2020-08-10 NOTE — TELEPHONE ENCOUNTER
Pt is overdue for OV with SD (follow up and annual physical or AWV). Please call to schedule. Also, SD cannot be listed as PCP. This needs to be fixed.

## 2020-11-09 RX ORDER — METOPROLOL SUCCINATE 50 MG/1
50 TABLET, EXTENDED RELEASE ORAL DAILY
Qty: 30 TABLET | Refills: 0 | Status: SHIPPED | OUTPATIENT
Start: 2020-11-09 | End: 2020-12-29

## 2020-11-09 RX ORDER — ALFUZOSIN HYDROCHLORIDE 10 MG/1
10 TABLET, EXTENDED RELEASE ORAL DAILY
Qty: 30 TABLET | Refills: 0 | Status: SHIPPED | OUTPATIENT
Start: 2020-11-09 | End: 2020-12-14

## 2020-11-09 NOTE — TELEPHONE ENCOUNTER
Last Ov: 6/24/19, SD, CPE  Last labs: CMP, Lipid, A1c 7/13/20  Last Rx: metoprolol ER 50mg, #90, 3R 6/14/19    No future appointments. Per Protocol - failed, pt due for appt. Rx pending.

## 2020-11-09 NOTE — TELEPHONE ENCOUNTER
Last VISIT 6/24/19 SD CAD    Last REFILL 8/10/20 Alfuzosin 90T 0R    Last LABS 7/13/20 HgA1c, Lipid, CMP    No future appointments. Per PROTOCOL? Alfuzosin not on protocol, Route to provider    Please Approve or Deny.

## 2020-12-14 RX ORDER — ALFUZOSIN HYDROCHLORIDE 10 MG/1
TABLET, EXTENDED RELEASE ORAL
Qty: 14 TABLET | Refills: 0 | Status: SHIPPED | OUTPATIENT
Start: 2020-12-14 | End: 2021-01-07

## 2020-12-14 NOTE — TELEPHONE ENCOUNTER
Patient overdue for annual please call and schedule OV. Last OV: 6/24/19 physical    No future appointments.      Latest labs: 7/13/20 CMP, Lipid and A1c    Latest RX: ALFUZOSIN ER 10MG TABLETS 30 tabs 0 refills on 11/9/20    Per protocol, not on protoco

## 2020-12-29 RX ORDER — METOPROLOL SUCCINATE 50 MG/1
TABLET, EXTENDED RELEASE ORAL
Qty: 30 TABLET | Refills: 0 | Status: SHIPPED | OUTPATIENT
Start: 2020-12-29 | End: 2021-03-01

## 2020-12-29 NOTE — TELEPHONE ENCOUNTER
Please call patient. His last ov was June 2019    I do not want to leave him without medication but he is overdue for ov.

## 2020-12-29 NOTE — TELEPHONE ENCOUNTER
Last Ov:6/24/19  Upcoming appt:none  Last labs:7/13/20 a1c, lipid, cmp  Last Rx:11/9/20 metoprolol succinate er 50mg    Per Protocol sent for review

## 2021-01-07 RX ORDER — ALFUZOSIN HYDROCHLORIDE 10 MG/1
10 TABLET, EXTENDED RELEASE ORAL DAILY
Qty: 30 TABLET | Refills: 0 | Status: SHIPPED | OUTPATIENT
Start: 2021-01-07 | End: 2021-02-10

## 2021-01-07 NOTE — TELEPHONE ENCOUNTER
LOV:06/24/19, CPE, SD  Last CPE:6/24/19, SD  Last refill:ALFUZOSIN HCL ER 10 MG Oral Tablet 24 Hr,12/14/20  Quantity:14 tablet, 0 refills  Last labs that are related: cmp, lipid 7/13/20  Future appointment:  Future Appointments   Date Time Provider Melanie Yeh   2/6/2021  9:00 AM CARLY Clifford [facility]     Protocol: pend for provider, no protocol    Please approve or deny

## 2021-01-07 NOTE — TELEPHONE ENCOUNTER
Future Appointments   Date Time Provider Melanie Yeh   2/6/2021  9:00 AM CARLY Davenport [facility]

## 2021-01-19 ENCOUNTER — APPOINTMENT (OUTPATIENT)
Dept: CARDIOLOGY | Age: 81
End: 2021-01-19

## 2021-02-06 ENCOUNTER — OFFICE VISIT (OUTPATIENT)
Dept: INTERNAL MEDICINE CLINIC | Facility: CLINIC | Age: 81
End: 2021-02-06
Payer: COMMERCIAL

## 2021-02-06 VITALS
HEIGHT: 69 IN | DIASTOLIC BLOOD PRESSURE: 62 MMHG | BODY MASS INDEX: 29.03 KG/M2 | HEART RATE: 60 BPM | WEIGHT: 196 LBS | TEMPERATURE: 97 F | SYSTOLIC BLOOD PRESSURE: 124 MMHG

## 2021-02-06 DIAGNOSIS — G89.29 CHRONIC BILATERAL LOW BACK PAIN WITHOUT SCIATICA: ICD-10-CM

## 2021-02-06 DIAGNOSIS — M54.50 CHRONIC BILATERAL LOW BACK PAIN WITHOUT SCIATICA: ICD-10-CM

## 2021-02-06 DIAGNOSIS — Z95.1 S/P CABG (CORONARY ARTERY BYPASS GRAFT): ICD-10-CM

## 2021-02-06 DIAGNOSIS — Z00.00 ROUTINE GENERAL MEDICAL EXAMINATION AT A HEALTH CARE FACILITY: Primary | ICD-10-CM

## 2021-02-06 DIAGNOSIS — N43.3 HYDROCELE IN ADULT: ICD-10-CM

## 2021-02-06 DIAGNOSIS — E78.5 DYSLIPIDEMIA: ICD-10-CM

## 2021-02-06 DIAGNOSIS — L98.9 SKIN LESION: ICD-10-CM

## 2021-02-06 DIAGNOSIS — I25.10 CORONARY ARTERY DISEASE INVOLVING NATIVE CORONARY ARTERY OF NATIVE HEART WITHOUT ANGINA PECTORIS: ICD-10-CM

## 2021-02-06 DIAGNOSIS — I25.2 HISTORY OF MYOCARDIAL INFARCTION: ICD-10-CM

## 2021-02-06 DIAGNOSIS — R41.3 MEMORY CHANGE: ICD-10-CM

## 2021-02-06 DIAGNOSIS — E55.9 VITAMIN D DEFICIENCY: ICD-10-CM

## 2021-02-06 DIAGNOSIS — R73.03 PREDIABETES: ICD-10-CM

## 2021-02-06 DIAGNOSIS — N40.0 BENIGN PROSTATIC HYPERPLASIA, UNSPECIFIED WHETHER LOWER URINARY TRACT SYMPTOMS PRESENT: ICD-10-CM

## 2021-02-06 DIAGNOSIS — N52.9 ERECTILE DYSFUNCTION, UNSPECIFIED ERECTILE DYSFUNCTION TYPE: ICD-10-CM

## 2021-02-06 PROBLEM — R73.9 HYPERGLYCEMIA: Status: RESOLVED | Noted: 2017-03-05 | Resolved: 2021-02-06

## 2021-02-06 PROCEDURE — 99397 PER PM REEVAL EST PAT 65+ YR: CPT | Performed by: NURSE PRACTITIONER

## 2021-02-06 PROCEDURE — 3074F SYST BP LT 130 MM HG: CPT | Performed by: NURSE PRACTITIONER

## 2021-02-06 PROCEDURE — 3078F DIAST BP <80 MM HG: CPT | Performed by: NURSE PRACTITIONER

## 2021-02-06 PROCEDURE — 3008F BODY MASS INDEX DOCD: CPT | Performed by: NURSE PRACTITIONER

## 2021-02-06 NOTE — PROGRESS NOTES
Roslyn Flores is a 79year old male who presents for a complete physical exam.     HPI:   Pt complains of . CAD/CABG  Stable  Per Dr Leidy Askew    Dyslipidemia  Due for labs. atrovastatin 20mg. Prediabetes  Due for labs.   Check a1c    Hydrocele/BPH Bisulfate 75 MG Oral Tab Take 1 tablet (75 mg total) by mouth daily. 30 tablet 11   • METOPROLOL SUCCINATE ER 50 MG Oral Tablet 24 Hr TAKE 1 TABLET(50 MG) BY MOUTH DAILY (Patient taking differently: Take 25 mg by mouth daily.  Half tab daily ) 30 tablet 0 01/06/2017  PSA:  Defer to Dr Ashish Quinn:   GENERAL: feels well otherwise  SKIN: denies any unusual skin lesions  EYES:denies blurred vision or double vision  HEENT: denies nasal congestion, sinus pain or ear discomfort  LUNGS: denies shortn Per Dr Arrieta Medicus  S/p cabg (coronary artery bypass graft)  Prediabetes  Check labs. Dyslipidemia  Cont lipitor.    Erectile dysfunction, unspecified erectile dysfunction type  Skin lesion  Refer to DR BLANTON [facility]  Memory change  Monitor  Check b12 and vit d    Chron

## 2021-02-06 NOTE — PATIENT INSTRUCTIONS
miralax   Stool softener    benefiber or metamucil daily    Hold b12 for a few days before labs done

## 2021-02-11 RX ORDER — ALFUZOSIN HYDROCHLORIDE 10 MG/1
10 TABLET, EXTENDED RELEASE ORAL DAILY
Qty: 30 TABLET | Refills: 0 | Status: SHIPPED | OUTPATIENT
Start: 2021-02-11 | End: 2021-03-01

## 2021-02-11 NOTE — TELEPHONE ENCOUNTER
Last OV 2.6.21 w/ SD (annual pe)   Last PE 2.6.21  Last REFILL 1.7.21 Alfuzosin 10mg #30 0R  Last LABS No recent labs within last 12 months     No future appointments. Per PROTOCOL?  Not on protocol     Please Advise

## 2021-02-12 ENCOUNTER — LAB ENCOUNTER (OUTPATIENT)
Dept: LAB | Age: 81
End: 2021-02-12
Attending: NURSE PRACTITIONER
Payer: COMMERCIAL

## 2021-02-12 DIAGNOSIS — L98.9 SKIN LESION: ICD-10-CM

## 2021-02-12 DIAGNOSIS — E55.9 VITAMIN D DEFICIENCY: ICD-10-CM

## 2021-02-12 DIAGNOSIS — R73.03 PREDIABETES: ICD-10-CM

## 2021-02-12 DIAGNOSIS — E78.5 DYSLIPIDEMIA: ICD-10-CM

## 2021-02-12 DIAGNOSIS — R41.3 MEMORY CHANGE: ICD-10-CM

## 2021-02-12 LAB
ALBUMIN SERPL-MCNC: 3.4 G/DL (ref 3.4–5)
ALBUMIN/GLOB SERPL: 1.1 {RATIO} (ref 1–2)
ALP LIVER SERPL-CCNC: 69 U/L
ALT SERPL-CCNC: 22 U/L
ANION GAP SERPL CALC-SCNC: 9 MMOL/L (ref 0–18)
AST SERPL-CCNC: 17 U/L (ref 15–37)
BASOPHILS # BLD AUTO: 0.02 X10(3) UL (ref 0–0.2)
BASOPHILS NFR BLD AUTO: 0.3 %
BILIRUB SERPL-MCNC: 0.7 MG/DL (ref 0.1–2)
BUN BLD-MCNC: 23 MG/DL (ref 7–18)
BUN/CREAT SERPL: 18.1 (ref 10–20)
CALCIUM BLD-MCNC: 9.1 MG/DL (ref 8.5–10.1)
CHLORIDE SERPL-SCNC: 111 MMOL/L (ref 98–112)
CHOLEST SMN-MCNC: 132 MG/DL (ref ?–200)
CO2 SERPL-SCNC: 24 MMOL/L (ref 21–32)
CREAT BLD-MCNC: 1.27 MG/DL
DEPRECATED RDW RBC AUTO: 44.6 FL (ref 35.1–46.3)
EOSINOPHIL # BLD AUTO: 0.18 X10(3) UL (ref 0–0.7)
EOSINOPHIL NFR BLD AUTO: 2.8 %
ERYTHROCYTE [DISTWIDTH] IN BLOOD BY AUTOMATED COUNT: 12.4 % (ref 11–15)
EST. AVERAGE GLUCOSE BLD GHB EST-MCNC: 140 MG/DL (ref 68–126)
GLOBULIN PLAS-MCNC: 3.1 G/DL (ref 2.8–4.4)
GLUCOSE BLD-MCNC: 127 MG/DL (ref 70–99)
HBA1C MFR BLD HPLC: 6.5 % (ref ?–5.7)
HCT VFR BLD AUTO: 45.7 %
HDLC SERPL-MCNC: 44 MG/DL (ref 40–59)
HGB BLD-MCNC: 14.7 G/DL
IMM GRANULOCYTES # BLD AUTO: 0.02 X10(3) UL (ref 0–1)
IMM GRANULOCYTES NFR BLD: 0.3 %
LDLC SERPL CALC-MCNC: 63 MG/DL (ref ?–100)
LYMPHOCYTES # BLD AUTO: 2.15 X10(3) UL (ref 1–4)
LYMPHOCYTES NFR BLD AUTO: 33 %
M PROTEIN MFR SERPL ELPH: 6.5 G/DL (ref 6.4–8.2)
MCH RBC QN AUTO: 31.2 PG (ref 26–34)
MCHC RBC AUTO-ENTMCNC: 32.2 G/DL (ref 31–37)
MCV RBC AUTO: 97 FL
MONOCYTES # BLD AUTO: 0.48 X10(3) UL (ref 0.1–1)
MONOCYTES NFR BLD AUTO: 7.4 %
NEUTROPHILS # BLD AUTO: 3.66 X10 (3) UL (ref 1.5–7.7)
NEUTROPHILS # BLD AUTO: 3.66 X10(3) UL (ref 1.5–7.7)
NEUTROPHILS NFR BLD AUTO: 56.2 %
NONHDLC SERPL-MCNC: 88 MG/DL (ref ?–130)
OSMOLALITY SERPL CALC.SUM OF ELEC: 303 MOSM/KG (ref 275–295)
PATIENT FASTING Y/N/NP: YES
PATIENT FASTING Y/N/NP: YES
PLATELET # BLD AUTO: 175 10(3)UL (ref 150–450)
POTASSIUM SERPL-SCNC: 4.4 MMOL/L (ref 3.5–5.1)
RBC # BLD AUTO: 4.71 X10(6)UL
SODIUM SERPL-SCNC: 144 MMOL/L (ref 136–145)
T4 FREE SERPL-MCNC: 0.9 NG/DL (ref 0.8–1.7)
TRIGL SERPL-MCNC: 126 MG/DL (ref 30–149)
TSI SER-ACNC: 5.88 MIU/ML (ref 0.36–3.74)
VIT B12 SERPL-MCNC: 561 PG/ML (ref 193–986)
VIT D+METAB SERPL-MCNC: 43.2 NG/ML (ref 30–100)
VLDLC SERPL CALC-MCNC: 25 MG/DL (ref 0–30)
WBC # BLD AUTO: 6.5 X10(3) UL (ref 4–11)

## 2021-02-12 PROCEDURE — 82607 VITAMIN B-12: CPT

## 2021-02-12 PROCEDURE — 80061 LIPID PANEL: CPT

## 2021-02-12 PROCEDURE — 84439 ASSAY OF FREE THYROXINE: CPT

## 2021-02-12 PROCEDURE — 83036 HEMOGLOBIN GLYCOSYLATED A1C: CPT

## 2021-02-12 PROCEDURE — 84443 ASSAY THYROID STIM HORMONE: CPT

## 2021-02-12 PROCEDURE — 85025 COMPLETE CBC W/AUTO DIFF WBC: CPT

## 2021-02-12 PROCEDURE — 36415 COLL VENOUS BLD VENIPUNCTURE: CPT

## 2021-02-12 PROCEDURE — 82306 VITAMIN D 25 HYDROXY: CPT

## 2021-02-12 PROCEDURE — 80053 COMPREHEN METABOLIC PANEL: CPT

## 2021-02-12 NOTE — PROGRESS NOTES
Chol controlled. TSH is high. B12 normal.  Can discuss all abnormalities with SD at OV (see other result note).

## 2021-02-12 NOTE — PROGRESS NOTES
A1c now falling in diabetic range. Needs OV with SD to discuss change in A1c (in the next month).   Normal CBC and Vit D.

## 2021-02-13 RX ORDER — ATORVASTATIN CALCIUM 20 MG/1
20 TABLET, FILM COATED ORAL NIGHTLY
Qty: 30 TABLET | Refills: 0 | Status: SHIPPED | OUTPATIENT
Start: 2021-02-13 | End: 2021-03-01

## 2021-02-25 RX ORDER — CLOPIDOGREL BISULFATE 75 MG/1
TABLET ORAL
Qty: 90 TABLET | Refills: 1 | Status: SHIPPED | OUTPATIENT
Start: 2021-02-25

## 2021-03-01 ENCOUNTER — OFFICE VISIT (OUTPATIENT)
Dept: INTERNAL MEDICINE CLINIC | Facility: CLINIC | Age: 81
End: 2021-03-01
Payer: COMMERCIAL

## 2021-03-01 VITALS
WEIGHT: 191 LBS | HEART RATE: 72 BPM | HEIGHT: 69 IN | BODY MASS INDEX: 28.29 KG/M2 | DIASTOLIC BLOOD PRESSURE: 54 MMHG | SYSTOLIC BLOOD PRESSURE: 108 MMHG | TEMPERATURE: 97 F

## 2021-03-01 DIAGNOSIS — I25.10 CORONARY ARTERY DISEASE INVOLVING NATIVE CORONARY ARTERY OF NATIVE HEART WITHOUT ANGINA PECTORIS: ICD-10-CM

## 2021-03-01 DIAGNOSIS — E78.5 DYSLIPIDEMIA: ICD-10-CM

## 2021-03-01 DIAGNOSIS — E11.9 NEW ONSET TYPE 2 DIABETES MELLITUS (HCC): Primary | ICD-10-CM

## 2021-03-01 DIAGNOSIS — E03.9 ACQUIRED HYPOTHYROIDISM: ICD-10-CM

## 2021-03-01 PROBLEM — R73.03 PREDIABETES: Status: RESOLVED | Noted: 2018-05-08 | Resolved: 2021-03-01

## 2021-03-01 LAB
CREAT UR-SCNC: 305 MG/DL
MICROALBUMIN UR-MCNC: 3.72 MG/DL
MICROALBUMIN/CREAT 24H UR-RTO: 12.2 UG/MG (ref ?–30)

## 2021-03-01 PROCEDURE — 82570 ASSAY OF URINE CREATININE: CPT | Performed by: NURSE PRACTITIONER

## 2021-03-01 PROCEDURE — 3078F DIAST BP <80 MM HG: CPT | Performed by: NURSE PRACTITIONER

## 2021-03-01 PROCEDURE — 3074F SYST BP LT 130 MM HG: CPT | Performed by: NURSE PRACTITIONER

## 2021-03-01 PROCEDURE — 82043 UR ALBUMIN QUANTITATIVE: CPT | Performed by: NURSE PRACTITIONER

## 2021-03-01 PROCEDURE — 99214 OFFICE O/P EST MOD 30 MIN: CPT | Performed by: NURSE PRACTITIONER

## 2021-03-01 PROCEDURE — 3008F BODY MASS INDEX DOCD: CPT | Performed by: NURSE PRACTITIONER

## 2021-03-01 RX ORDER — ALFUZOSIN HYDROCHLORIDE 10 MG/1
10 TABLET, EXTENDED RELEASE ORAL DAILY
Qty: 90 TABLET | Refills: 3 | Status: SHIPPED | OUTPATIENT
Start: 2021-03-01

## 2021-03-01 RX ORDER — METOPROLOL SUCCINATE 50 MG/1
25 TABLET, EXTENDED RELEASE ORAL DAILY
Qty: 90 TABLET | Refills: 2 | Status: SHIPPED | OUTPATIENT
Start: 2021-03-01

## 2021-03-01 RX ORDER — ATORVASTATIN CALCIUM 20 MG/1
20 TABLET, FILM COATED ORAL NIGHTLY
Qty: 90 TABLET | Refills: 2 | Status: SHIPPED | OUTPATIENT
Start: 2021-03-01 | End: 2021-12-06

## 2021-03-01 RX ORDER — CLOPIDOGREL BISULFATE 75 MG/1
75 TABLET ORAL DAILY
Qty: 90 TABLET | Refills: 3 | Status: ON HOLD | OUTPATIENT
Start: 2021-03-01 | End: 2021-11-09

## 2021-03-01 RX ORDER — LEVOTHYROXINE SODIUM 0.03 MG/1
25 TABLET ORAL
Qty: 90 TABLET | Refills: 1 | Status: SHIPPED | OUTPATIENT
Start: 2021-03-01 | End: 2021-08-24

## 2021-03-01 NOTE — PROGRESS NOTES
Cole Nash is a 79year old male. Patient presents with:  Diabetes: AJ rm 10 f/u labs A1c- 6.5      HPI:   Here for lab follow up    New onset diabetes   a1c 6.5 on most recent labs. Discussed diabetes. On statin  Will check urine micro.   BP low hypertension       Social History:  Social History    Tobacco Use      Smoking status: Never Smoker      Smokeless tobacco: Never Used    Alcohol use: No    Drug use: No    Family History   Problem Relation Age of Onset   • Heart Disorder Father    • Canclementine Orders Placed This Encounter      Microalb/Creat Ratio, Random Urine [E]      Hemoglobin A1C [E]      TSH Free T4 3mo      Meds & Refills for this Visit:  Requested Prescriptions     Signed Prescriptions Disp Refills   • Levothyroxine Sodium 25 MCG Ora

## 2021-03-03 DIAGNOSIS — Z23 NEED FOR VACCINATION: ICD-10-CM

## 2021-03-06 ENCOUNTER — IMMUNIZATION (OUTPATIENT)
Dept: LAB | Age: 81
End: 2021-03-06
Attending: HOSPITALIST
Payer: MEDICARE

## 2021-03-06 DIAGNOSIS — Z23 NEED FOR VACCINATION: Primary | ICD-10-CM

## 2021-03-06 PROCEDURE — 0001A SARSCOV2 VAC 30MCG/0.3ML IM: CPT

## 2021-03-27 ENCOUNTER — IMMUNIZATION (OUTPATIENT)
Dept: LAB | Age: 81
End: 2021-03-27
Attending: HOSPITALIST
Payer: MEDICARE

## 2021-03-27 DIAGNOSIS — Z23 NEED FOR VACCINATION: Primary | ICD-10-CM

## 2021-03-27 PROCEDURE — 0002A SARSCOV2 VAC 30MCG/0.3ML IM: CPT

## 2021-06-24 ENCOUNTER — TELEPHONE (OUTPATIENT)
Dept: INTERNAL MEDICINE CLINIC | Facility: CLINIC | Age: 81
End: 2021-06-24

## 2021-06-24 NOTE — TELEPHONE ENCOUNTER
Abstracted Diabetic eye exam from [facility] 6/22/21. Updated HM. Placed on provider SD desk for review.

## 2021-08-24 RX ORDER — LEVOTHYROXINE SODIUM 0.03 MG/1
TABLET ORAL
Qty: 30 TABLET | Refills: 0 | Status: SHIPPED | OUTPATIENT
Start: 2021-08-24 | End: 2021-09-28

## 2021-08-24 NOTE — TELEPHONE ENCOUNTER
Last OV: 3/1/21 DM f/u  Last PE: 2/6/21- SD      No future appointments. Latest labs: 2/12/21 TSH and T4.  Pending to complete are TSH and A1c (repeat overdue-mychart sent to remind)    Latest RX: LEVOTHYROXINE 0.025MG (25MCG) TAB 90 tabs 1 refill on 3/

## 2021-09-01 ENCOUNTER — LAB ENCOUNTER (OUTPATIENT)
Dept: LAB | Age: 81
End: 2021-09-01
Attending: NURSE PRACTITIONER
Payer: COMMERCIAL

## 2021-09-01 DIAGNOSIS — E03.9 ACQUIRED HYPOTHYROIDISM: ICD-10-CM

## 2021-09-01 DIAGNOSIS — E11.9 NEW ONSET TYPE 2 DIABETES MELLITUS (HCC): ICD-10-CM

## 2021-09-01 LAB
EST. AVERAGE GLUCOSE BLD GHB EST-MCNC: 131 MG/DL (ref 68–126)
HBA1C MFR BLD HPLC: 6.2 % (ref ?–5.7)
T4 FREE SERPL-MCNC: 1.1 NG/DL (ref 0.8–1.7)
TSI SER-ACNC: 3.11 MIU/ML (ref 0.36–3.74)

## 2021-09-01 PROCEDURE — 84439 ASSAY OF FREE THYROXINE: CPT

## 2021-09-01 PROCEDURE — 36415 COLL VENOUS BLD VENIPUNCTURE: CPT

## 2021-09-01 PROCEDURE — 84443 ASSAY THYROID STIM HORMONE: CPT

## 2021-09-01 PROCEDURE — 83036 HEMOGLOBIN GLYCOSYLATED A1C: CPT

## 2021-09-28 RX ORDER — LEVOTHYROXINE SODIUM 0.03 MG/1
TABLET ORAL
Qty: 30 TABLET | Refills: 0 | Status: SHIPPED | OUTPATIENT
Start: 2021-09-28 | End: 2021-10-26

## 2021-10-26 RX ORDER — LEVOTHYROXINE SODIUM 0.03 MG/1
TABLET ORAL
Qty: 30 TABLET | Refills: 0 | Status: SHIPPED | OUTPATIENT
Start: 2021-10-26 | End: 2021-12-06

## 2021-11-01 ENCOUNTER — HOSPITAL ENCOUNTER (OUTPATIENT)
Age: 81
Discharge: HOME | End: 2021-11-01
Attending: EMERGENCY MEDICINE
Payer: COMMERCIAL

## 2021-11-01 ENCOUNTER — APPOINTMENT (OUTPATIENT)
Dept: ULTRASOUND IMAGING | Age: 81
End: 2021-11-01
Attending: EMERGENCY MEDICINE
Payer: COMMERCIAL

## 2021-11-01 ENCOUNTER — HOSPITAL ENCOUNTER (INPATIENT)
Facility: HOSPITAL | Age: 81
LOS: 8 days | Discharge: HOME | DRG: 712 | End: 2021-11-10
Attending: EMERGENCY MEDICINE | Admitting: HOSPITALIST
Payer: COMMERCIAL

## 2021-11-01 VITALS
SYSTOLIC BLOOD PRESSURE: 119 MMHG | HEART RATE: 94 BPM | DIASTOLIC BLOOD PRESSURE: 65 MMHG | RESPIRATION RATE: 16 BRPM | OXYGEN SATURATION: 96 % | HEIGHT: 69 IN | WEIGHT: 187 LBS | BODY MASS INDEX: 27.7 KG/M2 | TEMPERATURE: 98 F

## 2021-11-01 DIAGNOSIS — N43.3 HYDROCELE, UNSPECIFIED HYDROCELE TYPE: ICD-10-CM

## 2021-11-01 DIAGNOSIS — N49.2 CELLULITIS, SCROTUM: Primary | ICD-10-CM

## 2021-11-01 DIAGNOSIS — N49.2 CELLULITIS OF SCROTUM: Primary | ICD-10-CM

## 2021-11-01 PROCEDURE — 99223 1ST HOSP IP/OBS HIGH 75: CPT | Performed by: HOSPITALIST

## 2021-11-01 PROCEDURE — 87086 URINE CULTURE/COLONY COUNT: CPT | Performed by: EMERGENCY MEDICINE

## 2021-11-01 PROCEDURE — 36415 COLL VENOUS BLD VENIPUNCTURE: CPT

## 2021-11-01 PROCEDURE — 76870 US EXAM SCROTUM: CPT | Performed by: EMERGENCY MEDICINE

## 2021-11-01 PROCEDURE — 81001 URINALYSIS AUTO W/SCOPE: CPT | Performed by: EMERGENCY MEDICINE

## 2021-11-01 PROCEDURE — 80047 BASIC METABLC PNL IONIZED CA: CPT

## 2021-11-01 PROCEDURE — 85025 COMPLETE CBC W/AUTO DIFF WBC: CPT | Performed by: EMERGENCY MEDICINE

## 2021-11-01 PROCEDURE — 99214 OFFICE O/P EST MOD 30 MIN: CPT

## 2021-11-01 PROCEDURE — 93975 VASCULAR STUDY: CPT | Performed by: EMERGENCY MEDICINE

## 2021-11-01 NOTE — ED PROVIDER NOTES
Patient Seen in: [facility]      History   Patient presents with:  Isra    Stated Complaint: BLOOD TINGED URINE / SWELLING OF TESTICLES    Subjective:   HPI    Patient here for left-sided scrotal pain. Hydrocele, longstanding.     See Pupils are equal, round, and reactive to light. Neck: Normal range of motion. Neck supple. Cardiovascular: Normal rate, regular rhythm  Pulmonary/Chest: Normal effort. No accessory muscle use. No clubbing, no cyanosis. Abdominal: Soft.  Bowel sounds Normal arterial and venous waveforms are present bilaterally. EPIDIDYMIS:  Right epididymal head cyst has an overall simple appearance and measures up to 16 mm. Left epididymal head cyst measures up to 8 mm.  HYDROCELE:  There are complex hydroceles prese

## 2021-11-01 NOTE — ED INITIAL ASSESSMENT (HPI)
Pt c/o testicular pain, hydrocele, sent to ED for further work up. Pt also has elevated WBC's per wife.

## 2021-11-01 NOTE — ED INITIAL ASSESSMENT (HPI)
Patient reports chronic issues with swelling to scrotum. Reports having fluid drained on 10/27 with some relief but since then pain and swelling increasing. Reports some chronic prostate issues as well, states last night urine was pink in color.  Still able

## 2021-11-02 PROCEDURE — 99232 SBSQ HOSP IP/OBS MODERATE 35: CPT | Performed by: HOSPITALIST

## 2021-11-02 RX ORDER — POLYETHYLENE GLYCOL 3350 17 G/17G
17 POWDER, FOR SOLUTION ORAL DAILY PRN
Status: DISCONTINUED | OUTPATIENT
Start: 2021-11-02 | End: 2021-11-10

## 2021-11-02 RX ORDER — METOPROLOL SUCCINATE 25 MG/1
25 TABLET, EXTENDED RELEASE ORAL DAILY
Status: DISCONTINUED | OUTPATIENT
Start: 2021-11-02 | End: 2021-11-10

## 2021-11-02 RX ORDER — MULTIVITAMIN/IRON/FOLIC ACID 18MG-0.4MG
250 TABLET ORAL 4 TIMES DAILY
Status: DISCONTINUED | OUTPATIENT
Start: 2021-11-02 | End: 2021-11-10

## 2021-11-02 RX ORDER — ACETAMINOPHEN 325 MG/1
650 TABLET ORAL EVERY 6 HOURS PRN
Status: DISCONTINUED | OUTPATIENT
Start: 2021-11-02 | End: 2021-11-10

## 2021-11-02 RX ORDER — MELATONIN
3 NIGHTLY PRN
Status: DISCONTINUED | OUTPATIENT
Start: 2021-11-02 | End: 2021-11-10

## 2021-11-02 RX ORDER — DOCUSATE SODIUM 100 MG/1
100 CAPSULE, LIQUID FILLED ORAL 2 TIMES DAILY
Status: DISCONTINUED | OUTPATIENT
Start: 2021-11-02 | End: 2021-11-10

## 2021-11-02 RX ORDER — ATORVASTATIN CALCIUM 20 MG/1
20 TABLET, FILM COATED ORAL NIGHTLY
Status: DISCONTINUED | OUTPATIENT
Start: 2021-11-02 | End: 2021-11-10

## 2021-11-02 RX ORDER — MORPHINE SULFATE 4 MG/ML
4 INJECTION, SOLUTION INTRAMUSCULAR; INTRAVENOUS EVERY 2 HOUR PRN
Status: DISCONTINUED | OUTPATIENT
Start: 2021-11-01 | End: 2021-11-10

## 2021-11-02 RX ORDER — SODIUM CHLORIDE 9 MG/ML
INJECTION, SOLUTION INTRAVENOUS CONTINUOUS
Status: DISCONTINUED | OUTPATIENT
Start: 2021-11-02 | End: 2021-11-04

## 2021-11-02 RX ORDER — ONDANSETRON 2 MG/ML
4 INJECTION INTRAMUSCULAR; INTRAVENOUS EVERY 6 HOURS PRN
Status: DISCONTINUED | OUTPATIENT
Start: 2021-11-02 | End: 2021-11-10

## 2021-11-02 RX ORDER — METOCLOPRAMIDE HYDROCHLORIDE 5 MG/ML
10 INJECTION INTRAMUSCULAR; INTRAVENOUS EVERY 8 HOURS PRN
Status: DISCONTINUED | OUTPATIENT
Start: 2021-11-02 | End: 2021-11-10

## 2021-11-02 RX ORDER — SODIUM PHOSPHATE, DIBASIC AND SODIUM PHOSPHATE, MONOBASIC 7; 19 G/133ML; G/133ML
1 ENEMA RECTAL ONCE AS NEEDED
Status: DISCONTINUED | OUTPATIENT
Start: 2021-11-02 | End: 2021-11-10

## 2021-11-02 RX ORDER — BISACODYL 10 MG
10 SUPPOSITORY, RECTAL RECTAL
Status: DISCONTINUED | OUTPATIENT
Start: 2021-11-02 | End: 2021-11-10

## 2021-11-02 RX ORDER — HEPARIN SODIUM 5000 [USP'U]/ML
5000 INJECTION, SOLUTION INTRAVENOUS; SUBCUTANEOUS EVERY 8 HOURS SCHEDULED
Status: DISCONTINUED | OUTPATIENT
Start: 2021-11-02 | End: 2021-11-10

## 2021-11-02 RX ORDER — CLOPIDOGREL BISULFATE 75 MG/1
75 TABLET ORAL DAILY
Status: DISCONTINUED | OUTPATIENT
Start: 2021-11-02 | End: 2021-11-02

## 2021-11-02 RX ORDER — MORPHINE SULFATE 2 MG/ML
2 INJECTION, SOLUTION INTRAMUSCULAR; INTRAVENOUS EVERY 2 HOUR PRN
Status: DISCONTINUED | OUTPATIENT
Start: 2021-11-01 | End: 2021-11-10

## 2021-11-02 RX ORDER — MORPHINE SULFATE 2 MG/ML
1 INJECTION, SOLUTION INTRAMUSCULAR; INTRAVENOUS EVERY 2 HOUR PRN
Status: DISCONTINUED | OUTPATIENT
Start: 2021-11-01 | End: 2021-11-10

## 2021-11-02 RX ORDER — LEVOTHYROXINE SODIUM 0.03 MG/1
25 TABLET ORAL
Status: DISCONTINUED | OUTPATIENT
Start: 2021-11-02 | End: 2021-11-10

## 2021-11-02 RX ORDER — TAMSULOSIN HYDROCHLORIDE 0.4 MG/1
0.4 CAPSULE ORAL DAILY
Status: DISCONTINUED | OUTPATIENT
Start: 2021-11-02 | End: 2021-11-10

## 2021-11-02 NOTE — ED QUICK NOTES
Report given to JOSE R Kurtz. Pt resting comfortably with family at bedside. Waiting for inpatient room clean.

## 2021-11-02 NOTE — ED PROVIDER NOTES
Patient Seen in: [facility] Emergency Department      History   Patient presents with:  Rash Skin Problem    Stated Complaint: cellulitis    Subjective:   HPI    Patient is an 19-year-old male sent from [facility] immediate care for further evaluat and vital signs reviewed. All other systems reviewed and negative except as noted above.     Physical Exam     ED Triage Vitals [11/01/21 1513]   /59   Pulse 91   Resp 20   Temp 98.7 °F (37.1 °C)   Temp src    SpO2 96 %   O2 Device None (Room air duplex scan with B-mode, Doppler color flow, and spectral analysis were also performed. PATIENT STATED HISTORY: (As transcribed by Technologist)     FINDINGS:  TESTES:  Right testis measures 3.8 x 2.0 x 2.4 cm. Left testis measures 4.5 x 2.2 x 2.9 cm.   No Prescribed:  Current Discharge Medication List                          Hospital Problems             Present on Admission  Date Reviewed: 11/1/2021          ICD-10-CM Noted POA    * (Principal) Cellulitis of scrotum N49.2 11/1/2021 Unknown

## 2021-11-02 NOTE — H&P
[facility] HOSPITALIST  History and Physical     [facility] Patient Status:  Emergency    1940 MRN [id number]   Location [facility] Attending Apollo Alberto MD   Hosp Day # 0 PCP Lucero Genao MD     Chief Co MCG Oral Tab, TAKE 1 TABLET(25 MCG) BY MOUTH BEFORE BREAKFAST, Disp: 30 tablet, Rfl: 0  atorvastatin 20 MG Oral Tab, Take 1 tablet (20 mg total) by mouth nightly., Disp: 90 tablet, Rfl: 2  Clopidogrel Bisulfate 75 MG Oral Tab, Take 1 tablet (75 mg total) b maximum 7 days allowed. ). No results for input(s): PTP, INR in the last 168 hours.     COVID-19 Lab Results    COVID-19  Lab Results   Component Value Date    COVID19 Not Detected 11/01/2021       Pro-Calcitonin  No results for input(s): PCT in the last

## 2021-11-02 NOTE — CONSULTS
[facility]  [facility] Urology  Consultation Note    Ben Drake Patient Status:  Inpatient    1940 MRN [id number]   [facility] 4NW-A Attending Sonia Kendall MD   Hosp Day # 0 PCP Bubba Burciaga MD     [facility] for [facility], [facility] Other (Other) Brother         buergers   • Other (Other) Brother         buergers   • Cancer Maternal Grandmother    • Diabetes Paternal Grandmother       reports that he has never smoked.  He has never used smokeless tobacco. He reports that he does not dr HPI.    Physical Exam:  /58 (BP Location: Right arm)   Pulse 84   Temp 98.6 °F (37 °C) (Oral)   Resp 18   Ht 5' 9\" (1.753 m)   Wt 186 lb (84.4 kg)   SpO2 95%   BMI 27.47 kg/m²   GENERAL: the patient is resting in bed in no acute distress.     HEENT: 2.2 x 2.9 cm.  No intratesticular mass is seen. Neri Dominion are findings suggestive of tubular ectasia of the rete testes bilaterally.  This is similar to the prior exam.  Normal   arterial and venous waveforms are present bilaterally.     EPIDIDYMIS:  Right ep patient today as he is responding to IV abx. Follow serial examination  Monitor for fever  Continue to hold Plavix if ok with hospitalist  NPO after MN in case we need to consider surgical intervention  Will re-assess patient tomorrow.     Above discussed

## 2021-11-02 NOTE — PROGRESS NOTES
[facility]     Hospitalist Progress Note     Narinder Mendoza Patient Status:  Inpatient    1940 MRN [id number]   [facility] 4NW-A Attending Kailash Cordova MD   Hosp Day # 0 PCP Neema Fong MD     Chief Complaint: scro 0.4 mg Oral Daily   • atorvastatin  20 mg Oral Nightly   • levothyroxine  25 mcg Oral Before breakfast   • magnesium  250 mg Oral QID   • metoprolol succinate  25 mg Oral Daily   • Heparin Sodium (Porcine)  5,000 Units Subcutaneous Q8H [address]   • docusate sod

## 2021-11-03 PROCEDURE — 99232 SBSQ HOSP IP/OBS MODERATE 35: CPT | Performed by: HOSPITALIST

## 2021-11-03 NOTE — PROGRESS NOTES
[facility]  Urology Progress Note    Flip Cisneros Patient Status:  Inpatient    1940 MRN [id number]   [facility] 4NW-A Attending Matt Choudhury, [address] [facility] Day # 1 PCP Cony Archibald MD     Subjective:  [address] [facility]

## 2021-11-03 NOTE — PAYOR COMM NOTE
--------------  ADMISSION REVIEW     Payor: [address] [facility]  Subscriber #:  [id number]  Authorization Number: [id number]    Admit date: 11/2/21  Admit time: 12:37 AM       REVIEW DOCUMENTATION:     ED Provider Notes      ED Provider Notes signed by Review of Systems    Positive for stated complaint: cellulitis  Other systems are as noted in HPI. Constitutional and vital signs reviewed. All other systems reviewed and negative except as noted above.     Physical Exam     ED Triage Vitals [1 ultrasound was performed of the scrotal contents including the testicles, epididymis, spermatic cord, and scrotal wall. A duplex scan with B-mode, Doppler color flow, and spectral analysis were also performed.   PATIENT STATED HISTORY: (As transcribed by Te Disposition:  Admit  11/1/2021  7:56 pm        Hospital Problems             Present on Admission  Date Reviewed: 11/1/2021          ICD-10-CM Noted POA    * (Principal) Cellulitis of scrotum N49.2 11/1/2021 Unknown                 Signed by Mara Cruz bruits. Respiratory: Clear to auscultation bilaterally. No wheezes. No rhonchi. Cardiovascular: S1, S2. Regular rate and rhythm. No murmurs, rubs or gallops. Equal pulses. Chest and Back: No tenderness or deformity.   Abdomen: Soft, nontender, nondisten scrotal pain when walking or moving, none at rest     Objective:    Review of Systems:   10 point ROS completed and was negative, except for pertinent positive and negatives stated in subjective.     Vital signs:  Temp:  [98.1 °F (36.7 °C)-98.6 °F (37 °C)] piperacillin-tazobactam  3.375 g Intravenous Q8H         Assessment & Plan:       # Scrotal swelling with possible infected hydroceles hematoma  - urology following  - empiric antibiotics  - may need Surgical intervention in am- will keep NPO     #Hyperlip Cisco Marroquin RN      levothyroxine (Synthroid) tab 25 mcg     Date Action Dose Route User    11/3/2021 0515 Given 25 mcg Oral Cisco Marroquin RN      magnesium oxide (MAG-OX) tab 250 mg     Date Action Dose Route User    11/3/2021 1448 Given 2 0729 98.6 °F (37 °C) — 18 — — — None (Room air) — OY    11/02/21 0416 98.6 °F (37 °C) 73 18 122/49 97 % — None (Room air) — DANIA        CIWA Scores (since admission)     None

## 2021-11-03 NOTE — PLAN OF CARE
Pt Aox4, reports pain 2/10 but refuses pain meds. IV abx given per MAR. Scrotum very swollen and red. Elevated scrotum to help.  Urology MD stated potential surgical procedure Friday, okay to continue Heparin and to hold Plavix, also to continue on a regula

## 2021-11-03 NOTE — PROGRESS NOTES
[facility]     Hospitalist Progress Note     Franny Lawrence Patient Status:  Inpatient    1940 MRN [id number]   [facility] 4NW-A Attending Shaye Brown MD   Hosp Day # 1 PCP Natasha Fox MD     Chief Complaint: scro reviewed in Epic.     Medications:   • tamsulosin  0.4 mg Oral Daily   • atorvastatin  20 mg Oral Nightly   • levothyroxine  25 mcg Oral Before breakfast   • magnesium  250 mg Oral QID   • metoprolol succinate  25 mg Oral Daily   • Heparin Sodium (Porcine)

## 2021-11-03 NOTE — PLAN OF CARE
Pt Aox4, reports no pain. IVF and abx running. Talked w/ Dr. Renetta Wylie about holding the Plavix for a possible procedure tomorrow, pt will also be NPO @0000. Pt has edema in the scrotum, it is elevated. Pt has poor appetite.

## 2021-11-04 ENCOUNTER — APPOINTMENT (OUTPATIENT)
Dept: GENERAL RADIOLOGY | Facility: HOSPITAL | Age: 81
DRG: 712 | End: 2021-11-04
Attending: HOSPITALIST
Payer: COMMERCIAL

## 2021-11-04 ENCOUNTER — ANESTHESIA EVENT (OUTPATIENT)
Dept: SURGERY | Facility: HOSPITAL | Age: 81
DRG: 712 | End: 2021-11-04
Payer: COMMERCIAL

## 2021-11-04 PROCEDURE — 99232 SBSQ HOSP IP/OBS MODERATE 35: CPT | Performed by: HOSPITALIST

## 2021-11-04 PROCEDURE — 71045 X-RAY EXAM CHEST 1 VIEW: CPT | Performed by: HOSPITALIST

## 2021-11-04 RX ORDER — FUROSEMIDE 10 MG/ML
20 INJECTION INTRAMUSCULAR; INTRAVENOUS ONCE
Status: COMPLETED | OUTPATIENT
Start: 2021-11-04 | End: 2021-11-04

## 2021-11-04 NOTE — PROGRESS NOTES
[facility]     Hospitalist Progress Note     Franny Lawrence Patient Status:  Inpatient    1940 MRN [id number]   [facility] 4NW-A Attending Shaye Brown MD   Hosp Day # 2 PCP Natasha Fox MD     Chief Complaint: scro Imaging data reviewed in Epic.     Medications:   • piperacillin-tazobactam  3.375 g Intravenous Q8H   • tamsulosin  0.4 mg Oral Daily   • atorvastatin  20 mg Oral Nightly   • levothyroxine  25 mcg Oral Before breakfast   • magnesium  250 mg Oral QID   • me

## 2021-11-04 NOTE — PROGRESS NOTES
[facility]  Urology Progress Note    Justin Spears Patient Status:  Inpatient    1940 MRN [id number]   [facility] 4NW-A Attending Aurora [place], [address] [facility] Day # 2 PCP Akua Weber MD     Subjective:  [facility],[address]

## 2021-11-04 NOTE — ANESTHESIA PREPROCEDURE EVALUATION
PRE-OP EVALUATION    Patient Name: Coleen Birmingham    Admit Diagnosis: Cellulitis of scrotum [N49.2]    Pre-op Diagnosis: IN PATIENT    INCISION AND DRAINAGE LEFT SCROTAL ABSCESS, POSSIBLE HYDROCELECTOMY    Anesthesia Procedure: INCISION AND Amber Starcher injection 10 mg, 10 mg, Intravenous, Q8H PRN  [COMPLETED] Piperacillin Sod-Tazobactam So (ZOSYN) 3.375 g in dextrose 5% 100 mL IVPB-ADDV, 3.375 g, Intravenous, Once        Outpatient Medications:   LEVOTHYROXINE 25 MCG Oral Tab, TAKE 1 TABLET(25 MCG) BY MO Smoking status: Never Smoker      Smokeless tobacco: Never Used    Alcohol use: No      Drug use: No     Available pre-op labs reviewed.   Lab Results   Component Value Date    WBC 15.2 (H) 11/03/2021    RBC 3.92 11/03/2021    HGB 12.2 (L) 11/03/2021    HCT

## 2021-11-04 NOTE — PLAN OF CARE
Pt A&Ox4. Due to SOB, XR chest ordered, Lasix administered per MD, and IVF stopped. Pt is scheduled to have I&D procedure tomorrow 11/5 @ 1700. Consent is signed and on the chart. Pt will be clear liquids at midnight & NPO at 12pm 11/5.  Hold heparin aftern Progressing

## 2021-11-05 ENCOUNTER — ANESTHESIA (OUTPATIENT)
Dept: SURGERY | Facility: HOSPITAL | Age: 81
DRG: 712 | End: 2021-11-05
Payer: COMMERCIAL

## 2021-11-05 PROCEDURE — 99232 SBSQ HOSP IP/OBS MODERATE 35: CPT | Performed by: HOSPITALIST

## 2021-11-05 PROCEDURE — 0VB70ZZ EXCISION OF LEFT TUNICA VAGINALIS, OPEN APPROACH: ICD-10-PCS | Performed by: UROLOGY

## 2021-11-05 RX ORDER — MEPERIDINE HYDROCHLORIDE 25 MG/ML
12.5 INJECTION INTRAMUSCULAR; INTRAVENOUS; SUBCUTANEOUS AS NEEDED
Status: DISCONTINUED | OUTPATIENT
Start: 2021-11-05 | End: 2021-11-05 | Stop reason: HOSPADM

## 2021-11-05 RX ORDER — PHENYLEPHRINE HCL 10 MG/ML
VIAL (ML) INJECTION AS NEEDED
Status: DISCONTINUED | OUTPATIENT
Start: 2021-11-05 | End: 2021-11-05 | Stop reason: SURG

## 2021-11-05 RX ORDER — HYDROCODONE BITARTRATE AND ACETAMINOPHEN 5; 325 MG/1; MG/1
1 TABLET ORAL AS NEEDED
Status: DISCONTINUED | OUTPATIENT
Start: 2021-11-05 | End: 2021-11-05 | Stop reason: HOSPADM

## 2021-11-05 RX ORDER — POTASSIUM CHLORIDE 20 MEQ/1
40 TABLET, EXTENDED RELEASE ORAL EVERY 4 HOURS
Status: DISPENSED | OUTPATIENT
Start: 2021-11-05 | End: 2021-11-05

## 2021-11-05 RX ORDER — DIPHENHYDRAMINE HYDROCHLORIDE 50 MG/ML
12.5 INJECTION INTRAMUSCULAR; INTRAVENOUS AS NEEDED
Status: DISCONTINUED | OUTPATIENT
Start: 2021-11-05 | End: 2021-11-05 | Stop reason: HOSPADM

## 2021-11-05 RX ORDER — HYDROCODONE BITARTRATE AND ACETAMINOPHEN 5; 325 MG/1; MG/1
1 TABLET ORAL EVERY 6 HOURS PRN
Status: DISCONTINUED | OUTPATIENT
Start: 2021-11-05 | End: 2021-11-10

## 2021-11-05 RX ORDER — MIDAZOLAM HYDROCHLORIDE 1 MG/ML
1 INJECTION INTRAMUSCULAR; INTRAVENOUS EVERY 5 MIN PRN
Status: DISCONTINUED | OUTPATIENT
Start: 2021-11-05 | End: 2021-11-05 | Stop reason: HOSPADM

## 2021-11-05 RX ORDER — HYDROCODONE BITARTRATE AND ACETAMINOPHEN 5; 325 MG/1; MG/1
2 TABLET ORAL AS NEEDED
Status: DISCONTINUED | OUTPATIENT
Start: 2021-11-05 | End: 2021-11-05 | Stop reason: HOSPADM

## 2021-11-05 RX ORDER — METOPROLOL TARTRATE 5 MG/5ML
2.5 INJECTION INTRAVENOUS ONCE
Status: DISCONTINUED | OUTPATIENT
Start: 2021-11-05 | End: 2021-11-05 | Stop reason: HOSPADM

## 2021-11-05 RX ORDER — DEXTROSE MONOHYDRATE 25 G/50ML
50 INJECTION, SOLUTION INTRAVENOUS
Status: DISCONTINUED | OUTPATIENT
Start: 2021-11-05 | End: 2021-11-05 | Stop reason: HOSPADM

## 2021-11-05 RX ORDER — SODIUM CHLORIDE, SODIUM LACTATE, POTASSIUM CHLORIDE, CALCIUM CHLORIDE 600; 310; 30; 20 MG/100ML; MG/100ML; MG/100ML; MG/100ML
INJECTION, SOLUTION INTRAVENOUS CONTINUOUS
Status: DISCONTINUED | OUTPATIENT
Start: 2021-11-05 | End: 2021-11-05 | Stop reason: HOSPADM

## 2021-11-05 RX ORDER — ONDANSETRON 2 MG/ML
4 INJECTION INTRAMUSCULAR; INTRAVENOUS AS NEEDED
Status: DISCONTINUED | OUTPATIENT
Start: 2021-11-05 | End: 2021-11-05 | Stop reason: HOSPADM

## 2021-11-05 RX ORDER — BUPIVACAINE HYDROCHLORIDE 5 MG/ML
INJECTION, SOLUTION EPIDURAL; INTRACAUDAL AS NEEDED
Status: DISCONTINUED | OUTPATIENT
Start: 2021-11-05 | End: 2021-11-05 | Stop reason: HOSPADM

## 2021-11-05 RX ORDER — METOCLOPRAMIDE HYDROCHLORIDE 5 MG/ML
10 INJECTION INTRAMUSCULAR; INTRAVENOUS AS NEEDED
Status: DISCONTINUED | OUTPATIENT
Start: 2021-11-05 | End: 2021-11-05 | Stop reason: HOSPADM

## 2021-11-05 RX ORDER — DEXAMETHASONE SODIUM PHOSPHATE 4 MG/ML
VIAL (ML) INJECTION AS NEEDED
Status: DISCONTINUED | OUTPATIENT
Start: 2021-11-05 | End: 2021-11-05

## 2021-11-05 RX ORDER — NALOXONE HYDROCHLORIDE 0.4 MG/ML
80 INJECTION, SOLUTION INTRAMUSCULAR; INTRAVENOUS; SUBCUTANEOUS AS NEEDED
Status: DISCONTINUED | OUTPATIENT
Start: 2021-11-05 | End: 2021-11-05 | Stop reason: HOSPADM

## 2021-11-05 RX ORDER — ONDANSETRON 2 MG/ML
INJECTION INTRAMUSCULAR; INTRAVENOUS AS NEEDED
Status: DISCONTINUED | OUTPATIENT
Start: 2021-11-05 | End: 2021-11-05 | Stop reason: SURG

## 2021-11-05 RX ORDER — HYDROMORPHONE HYDROCHLORIDE 1 MG/ML
0.4 INJECTION, SOLUTION INTRAMUSCULAR; INTRAVENOUS; SUBCUTANEOUS EVERY 5 MIN PRN
Status: DISCONTINUED | OUTPATIENT
Start: 2021-11-05 | End: 2021-11-05 | Stop reason: HOSPADM

## 2021-11-05 RX ORDER — SODIUM CHLORIDE 9 MG/ML
INJECTION, SOLUTION INTRAVENOUS CONTINUOUS PRN
Status: DISCONTINUED | OUTPATIENT
Start: 2021-11-05 | End: 2021-11-05 | Stop reason: SURG

## 2021-11-05 RX ADMIN — SODIUM CHLORIDE: 9 INJECTION, SOLUTION INTRAVENOUS at 17:41:00

## 2021-11-05 RX ADMIN — PHENYLEPHRINE HCL 100 MCG: 10 MG/ML VIAL (ML) INJECTION at 18:13:00

## 2021-11-05 RX ADMIN — ONDANSETRON 4 MG: 2 INJECTION INTRAMUSCULAR; INTRAVENOUS at 18:34:00

## 2021-11-05 NOTE — ANESTHESIA PROCEDURE NOTES
Airway  Date/Time: 11/5/2021 5:49 PM  Urgency: elective      General Information and Staff    Patient location during procedure: OR  Anesthesiologist: Connie Lund MD  Performed: anesthesiologist     Indications and Patient Condition  Indications for airw

## 2021-11-05 NOTE — PROGRESS NOTES
Visiting with family. Scheduled for I/D at 5pm.  Afebrile. Voiding without difficulty. Took tylenol for back pain.

## 2021-11-05 NOTE — PROGRESS NOTES
[facility]     Hospitalist Progress Note     Donald Frazier Patient Status:  Inpatient    1940 MRN [id number]   [facility] 4NW-A Attending Gisselle Garrett MD   Hosp Day # 3 PCP Natacha Lopez MD     Chief Complaint: scro CK in the last 168 hours. Inflammatory Markers  No results for input(s): CRP, EMMA, LDH, DDIMER in the last 168 hours. Imaging: Imaging data reviewed in Epic.     Medications:   • potassium chloride  40 mEq Oral Q4H   • piperacillin-tazobactam  3.375 g

## 2021-11-05 NOTE — PROGRESS NOTES
[facility]  Urology Progress Note    Yancy Ventura Patient Status:  Inpatient    1940 MRN [id number]   [facility] 4NW-A Attending Tiffany Back, [address] [facility] Day # 3 PCP Durand Sandifer, MD     Subjective:  [facility],2Nd Floor

## 2021-11-05 NOTE — ANESTHESIA PROCEDURE NOTES
Peripheral IV  Date/Time: 11/5/2021 6:10 PM  Inserted by: Darius Yoder MD    Placement  Needle size: 18 G  Laterality: right  Location: hand  Local anesthetic: none  Site prep: alcohol  Technique: anatomical landmarks  Attempts: 1

## 2021-11-05 NOTE — PLAN OF CARE
Patient down to OR via bed and transport at approx 1640. NPO since 1200 as ordered. Scrotum elevated. Patient and family at bedside, updated and in agreement with POC. Patient progressing per POC. Patient voids. Denies need for pain medications.

## 2021-11-06 PROCEDURE — 99232 SBSQ HOSP IP/OBS MODERATE 35: CPT | Performed by: HOSPITALIST

## 2021-11-06 RX ORDER — POTASSIUM CHLORIDE 20 MEQ/1
40 TABLET, EXTENDED RELEASE ORAL EVERY 4 HOURS
Status: COMPLETED | OUTPATIENT
Start: 2021-11-06 | End: 2021-11-06

## 2021-11-06 NOTE — OPERATIVE REPORT
Operative Note    Patient Name: Amelie Bentley    Preoperative Diagnosis: Pyocele, left    Postoperative Diagnosis: Pyocele, left    Surgeon(s) and Role:     * Radha Rede MD - Primary     Assistant:      Procedures: scrotal exploration, left hydr thickened sac. I was able to open up any loculations and connect the anterior hydrocele space to the posterior. The thick sac was then opened longitudinally with cautery up to the spermatic cord.  The testicle was examined and seen to be grossly normal. Th

## 2021-11-06 NOTE — PROGRESS NOTES
[facility]     Hospitalist Progress Note     De Alicia Patient Status:  Inpatient    1940 MRN [id number]   [facility] 4NW-A Attending Cristofer Roa MD   Hosp Day # 4 PCP Aidee Ireland MD     Chief Complaint: scro hours.    Inflammatory Markers  No results for input(s): CRP, EMMA, LDH, DDIMER in the last 168 hours. Imaging: Imaging data reviewed in Epic.     Medications:   • piperacillin-tazobactam  3.375 g Intravenous Q8H   • tamsulosin  0.4 mg Oral Daily   • ator

## 2021-11-06 NOTE — PLAN OF CARE
Resting in bed. Awake & alert,patient is drinking well. Didn't have appetite this am. Had mainly liquids. Advancing diet from surgery yesterday. Tolerated liquids this am. Mesh underwear replaced. Dressings reinforced,replaced PRN.  Ice packs to affected area

## 2021-11-06 NOTE — ANESTHESIA POSTPROCEDURE EVALUATION
[address] [facility] Patient Status:  Inpatient   Age/Gender 79year old male MRN [id number]   Location [facility] Attending Rahel Stephen, [address] [facility] Day # 3 PCP Chioma Fitzgerald MD       Anesthesia Post

## 2021-11-06 NOTE — PROGRESS NOTES
Returned from recovery in stable condition. Complaining of pain 9/10 to scrotum. Alert and orientated x4. Afebrile. OR dressing dry and intact, scrotal support in place. Given morpine 4mg ivp and ice applied.   Good pain relief reported half hour later

## 2021-11-06 NOTE — PROGRESS NOTES
Progress Note    Osmel Mark Patient Status:  Inpatient    1940 MRN [id number]   [facility] 4NW-A Attending Mackenzie Perdue, [address] [facility] Day # 4 PCP Savage Ashley MD     Subjective: Follow up pyocele.   POD #1 scrotal explo

## 2021-11-07 PROCEDURE — 99232 SBSQ HOSP IP/OBS MODERATE 35: CPT | Performed by: HOSPITALIST

## 2021-11-07 NOTE — PROGRESS NOTES
[facility]  Progress Note    Kamille Ralph Patient Status:  Inpatient    1940 MRN [id number]   [facility] 4NW-A Attending Douglas Graves, [facility] Day # 5 PCP Marychuy Colin MD     Subjective:  Kamille Ralph is empirically  Cont with ice  Poss restart plavix as early as tomorrow  Anticipate dc home in the next 1-2 days    Jorge A Crouch MD  11/7/2021  7:23 AM

## 2021-11-07 NOTE — PROGRESS NOTES
Pt has large dressing to scrotal area with a penrose drain draining and pt has complaints of pain and given morphine with good relief. Pt receiving iv antibiotics as ordered.

## 2021-11-07 NOTE — PLAN OF CARE
Brought in some supplies to change dressing at 1600 but patient/ spouse said that the doctor was just in & did it. Patient's spouse passed by  & asked for 1 new ice pack & to do dressing change,RN went to see patient,offered dressing change but patient decl

## 2021-11-07 NOTE — PROGRESS NOTES
[facility]     Hospitalist Progress Note     Amelie Bentley Patient Status:  Inpatient    1940 MRN [id number]   [facility] 4NW-A Attending Jasson Huynh MD   Hosp Day # 5 PCP Mitchell Forrest MD     Chief Complaint: scro hours.    Creatinine Kinase  No results for input(s): CK in the last 168 hours. Inflammatory Markers  No results for input(s): CRP, EMMA, LDH, DDIMER in the last 168 hours. Imaging: Imaging data reviewed in Epic.     Medications:   • piperacillin-tazob

## 2021-11-08 PROCEDURE — 99232 SBSQ HOSP IP/OBS MODERATE 35: CPT | Performed by: HOSPITALIST

## 2021-11-08 NOTE — PROGRESS NOTES
[facility]     Hospitalist Progress Note     Amelie Bentley Patient Status:  Inpatient    1940 MRN [id number]   [facility] 4NW-A Attending Jasson Huynh MD   Hosp Day # 6 PCP Mitchell Forrest MD     Chief Complaint: scro Markers  No results for input(s): CRP, EMMA, LDH, DDIMER in the last 168 hours. Imaging: Imaging data reviewed in Epic.     Medications:   • piperacillin-tazobactam  3.375 g Intravenous Q8H   • tamsulosin  0.4 mg Oral Daily   • atorvastatin  20 mg Oral Ni

## 2021-11-08 NOTE — PROGRESS NOTES
Pt is alert and oriented and has no complaints of pain and has been receiving antibiotics as scheduled. Pt has ice to scrotum area and penrose drain draining in to dressing.

## 2021-11-08 NOTE — DIETARY NOTE
[address] [facility].     PMH:  has a past medical history of Atherosclerosis of coronary artery, Fibroma, Hearing impairment, High blood pressure, High cholesterol, Other and unspecified hyperlipidemia, Pyocele (11

## 2021-11-08 NOTE — PAYOR COMM NOTE
--------------  CONTINUED STAY REVIEW----REQUESTING ADDITIONAL DAY 11/8      Payor: [address] [place] [address]  Subscriber #:  [id number]  Authorization Number: [id number]    Admit date: 11/2/21  Admit time: 12:37 AM    Admitting Physician: Gabriele Farr MD  At results for input(s): TROP, PBNP in the last 168 hours.     Creatinine Kinase  No results for input(s): CK in the last 168 hours.     Inflammatory Markers  No results for input(s): CRP, EMMA, LDH, DDIMER in the last 168 hours.     Imaging: Imaging data revi (Left Upper Abdomen) Ela Chacon, RN      levothyroxine (Synthroid) tab 25 mcg     Date Action Dose Route User    11/8/2021 0506 Given 25 mcg Oral Nirmal Montiel, RN      magnesium oxide (MAG-OX) tab 250 mg     Date Action Dose Route User    1

## 2021-11-09 PROCEDURE — 99232 SBSQ HOSP IP/OBS MODERATE 35: CPT | Performed by: HOSPITALIST

## 2021-11-09 RX ORDER — CLOPIDOGREL BISULFATE 75 MG/1
75 TABLET ORAL DAILY
Qty: 90 TABLET | Refills: 3 | Status: SHIPPED | COMMUNITY
Start: 2021-11-11

## 2021-11-09 RX ORDER — AMOXICILLIN AND CLAVULANATE POTASSIUM 875; 125 MG/1; MG/1
1 TABLET, FILM COATED ORAL 2 TIMES DAILY
Qty: 18 TABLET | Refills: 0 | Status: SHIPPED | OUTPATIENT
Start: 2021-11-09 | End: 2021-11-18

## 2021-11-09 NOTE — PROGRESS NOTES
Pt is alert and oriented x4. Pt has no complaints of pain. Scrotum is elevated and pt has scrotum with ice on it. Pt is receiving iv antibiotics.

## 2021-11-09 NOTE — PLAN OF CARE
Problem: Patient/Family Goals  Goal: Patient/Family Short Term Goal  Description: Patient's Short Term Goal: Have scrotal swelling improved and able to go home soon. Interventions:   - Urology consult  -Elevate scrotum  -IV Antibiotics.   - See additio stable. No pain  at rest, pt feels pain and tender upon touch or movement. This morning pt scrotum was swollen with some drainage coming from the incision. Elevated the scrotum and Gauze fluffs changed.  Ice pack applied for discomfort per pt request. Pt d

## 2021-11-09 NOTE — PROGRESS NOTES
[facility]  Urology Progress Note    Odette Medico Patient Status:  Inpatient    1940 MRN [id number]   [facility] 4NW-A Attending Carolina Medina, [address] [facility] Day # 6 PCP Bart Bethea MD     Assessment/Plan:  POD#3 Left h

## 2021-11-09 NOTE — PROGRESS NOTES
[facility]     Hospitalist Progress Note     Angel Garcia Patient Status:  Inpatient    1940 MRN [id number]   [facility] 4NW-A Attending Thomas Coates MD   Hosp Day # 7 PCP Arleen Villafana MD     Chief Complaint: scro data reviewed in Epic.     Medications:   • piperacillin-tazobactam  3.375 g Intravenous Q8H   • tamsulosin  0.4 mg Oral Daily   • atorvastatin  20 mg Oral Nightly   • levothyroxine  25 mcg Oral Before breakfast   • magnesium  250 mg Oral QID   • metoprolol

## 2021-11-09 NOTE — PAYOR COMM NOTE
--------------  CONTINUED STAY REVIEW-----REQUESTING ADDITIONAL DAY 11/9      Payor: [facility]  Subscriber #:  [id number]  Authorization Number: [id number]    Admit date: 11/2/21  Admit time: 12:37 AM    Admitting Physician: MD PATIENCE Miles input(s): CK in the last 168 hours.     Inflammatory Markers  No results for input(s): CRP, EMMA, LDH, DDIMER in the last 168 hours.     Imaging: Imaging data reviewed in Epic.     Medications:   • piperacillin-tazobactam  3.375 g Intravenous Q8H   • tamsul Lower Abdomen) Laura Chacon, RN      levothyroxine (Synthroid) tab 25 mcg     Date Action Dose Route User    11/9/2021 0500 Given 25 mcg Oral Danish Amaya, JOSE R      magnesium oxide (MAG-OX) tab 250 mg     Date Action Dose Route User    11/9/20

## 2021-11-09 NOTE — PROGRESS NOTES
[address]  Patient Status:  Inpatient    1940 MRN [id number]   [facility] 4NW-A Attending Austin Nayak, [address] [facility] [address] Day # 7 PCP Lori Singh MD     Subjective:   Interval History: has complaints of some

## 2021-11-10 ENCOUNTER — TELEPHONE (OUTPATIENT)
Dept: SURGERY | Facility: HOSPITAL | Age: 81
End: 2021-11-10

## 2021-11-10 VITALS
BODY MASS INDEX: 28.51 KG/M2 | HEIGHT: 69 IN | RESPIRATION RATE: 23 BRPM | WEIGHT: 192.5 LBS | DIASTOLIC BLOOD PRESSURE: 58 MMHG | SYSTOLIC BLOOD PRESSURE: 157 MMHG | OXYGEN SATURATION: 97 % | HEART RATE: 57 BPM | TEMPERATURE: 98 F

## 2021-11-10 PROCEDURE — 99239 HOSP IP/OBS DSCHRG MGMT >30: CPT | Performed by: HOSPITALIST

## 2021-11-10 NOTE — TELEPHONE ENCOUNTER
F/U left Pyocele. I&D. 11/5/21 by Dr. Jessica Howell.   Arrange ov for M.D. exam and suture removal with Dr. Cristy Man or PL in 7-10 days.

## 2021-11-10 NOTE — PROGRESS NOTES
[facility]     Hospitalist Progress Note     De Alicia Patient Status:  Inpatient    1940 MRN [id number]   [facility] 4NW-A Attending Cristofer Roa MD   [facility] Day # 8 PCP Aidee Ireland MD     Chief Complaint: scro piperacillin-tazobactam  3.375 g Intravenous Q8H   • tamsulosin  0.4 mg Oral Daily   • atorvastatin  20 mg Oral Nightly   • levothyroxine  25 mcg Oral Before breakfast   • magnesium  250 mg Oral QID   • metoprolol succinate  25 mg Oral Daily   • Heparin So

## 2021-11-10 NOTE — PROGRESS NOTES
Patient discharged to home explained all medications and follow up appt. Also explained to keep scrotum with support. explained to let doctor know if there is any changes. Patient verbalized there understanding of teaching.

## 2021-11-10 NOTE — PROGRESS NOTES
[facility]  Urology Progress Note    Amelie Bentley Patient Status:  Inpatient    1940 MRN [id number]   [facility] 4NW-A Attending Johnathon Chew MD   Hosp Day # 8 PCP Mitchell Forrest MD     Assessment/Plan:  Left scrotal

## 2021-11-11 ENCOUNTER — PATIENT OUTREACH (OUTPATIENT)
Dept: CASE MANAGEMENT | Age: 81
End: 2021-11-11

## 2021-11-11 NOTE — PROGRESS NOTES
Attempted to contact patient for TCM. No answer and voicemail box has not been set up. Will follow-up.

## 2021-11-11 NOTE — PAYOR COMM NOTE
--------------  DISCHARGE REVIEW    Payor: [address] [facility]  Subscriber #:  [id number]  Authorization Number: [id number]    Admit date: 11/2/21  Admit time:  12:37 AM  Discharge Date: 11/10/2021  2:34 PM     Admitting Physician: MD Juan Carlos Barragan

## 2021-11-12 NOTE — DISCHARGE SUMMARY
[facility] HOSPITALIST  DISCHARGE SUMMARY     Faviola Carpenter Patient Status:  Inpatient    1940 MRN [id number]   [facility] 4NW-A Attending No att. providers found   [facility] Day # 8 PCP Vibha Jarquin MD     Date of Admission:  875-125 MG Tabs  Commonly known as: AUGMENTIN      Take 1 tablet by mouth 2 (two) times daily for 9 days.    Stop taking on: November 18, 2021  Quantity: 18 tablet  Refills: 0        CONTINUE taking these medications      Instructions Prescription details patients and visitors are required to wear a mask during their entire visit, only to be removed if asked to do so by your provider.   We are working to limit the number of people in our waiting rooms and ask that patients do not bring anyone with them to th

## 2021-11-16 NOTE — PROGRESS NOTES
Future Appointments - on abx  11/16/2021 9:30 AM    Kelly Reed MD            [facility]         [facility]

## 2021-12-03 ENCOUNTER — IMMUNIZATION (OUTPATIENT)
Dept: LAB | Facility: HOSPITAL | Age: 81
End: 2021-12-03
Attending: EMERGENCY MEDICINE
Payer: MEDICARE

## 2021-12-03 DIAGNOSIS — Z23 NEED FOR VACCINATION: Primary | ICD-10-CM

## 2021-12-03 PROCEDURE — 0004A SARSCOV2 VAC 30MCG/0.3ML IM: CPT

## 2021-12-06 RX ORDER — ATORVASTATIN CALCIUM 20 MG/1
TABLET, FILM COATED ORAL
Qty: 90 TABLET | Refills: 0 | Status: SHIPPED | OUTPATIENT
Start: 2021-12-06

## 2021-12-06 RX ORDER — LEVOTHYROXINE SODIUM 0.03 MG/1
TABLET ORAL
Qty: 90 TABLET | Refills: 0 | Status: SHIPPED | OUTPATIENT
Start: 2021-12-06

## 2021-12-15 PROBLEM — N49.2 CELLULITIS OF SCROTUM: Status: RESOLVED | Noted: 2021-11-01 | Resolved: 2021-12-15

## 2022-03-03 RX ORDER — LEVOTHYROXINE SODIUM 0.03 MG/1
TABLET ORAL
Qty: 90 TABLET | Refills: 0 | Status: SHIPPED | OUTPATIENT
Start: 2022-03-03

## 2022-03-03 NOTE — TELEPHONE ENCOUNTER
Last VISIT - 3/1/21 DM    Last CPE - 2/6/21    Last REFILL -     LEVOTHYROXINE 25 MCG Oral Tab 90 tablet 0 12/6/2021     Last LABS - 11/5/21 bmp, cbc    Future Appointments   Date Time Provider Melanie Yeh   3/8/2022  9:40 AM CARLY Yang [facility] [address] [facility]       Per PROTOCOL? PASSED    Please Approve or Deny.

## 2022-03-07 ENCOUNTER — TELEPHONE (OUTPATIENT)
Dept: INTERNAL MEDICINE CLINIC | Facility: CLINIC | Age: 82
End: 2022-03-07

## 2022-03-07 NOTE — TELEPHONE ENCOUNTER
Patient scheduled a [facility] appt for 3/8/22 with SD for Intense lower back pain and swollen lower legs and feet. Patient states he has had a back issue since he was 24years of age, thinks this may be a sciatic pain L>R, most of the time does not have pain but sometimes pain can be an 8, also has left groin pain sometimes which makes it difficult to walk, all of his joints 'make noise'. Pt states his legs, ankles are a little swollen, no pain, socks make marks at the end of the day, can be a little short of breath. Pt states he has not seen Dr Heather Callahan in some time so will call his office as well to schedule a f/u.     Moved pt's appt from 3/8/22 with SD to 2:20pm 40 minutes with SD.    FYI to SD.

## 2022-03-08 ENCOUNTER — HOSPITAL ENCOUNTER (EMERGENCY)
Facility: HOSPITAL | Age: 82
Discharge: HOME | End: 2022-03-08
Attending: EMERGENCY MEDICINE
Payer: COMMERCIAL

## 2022-03-08 ENCOUNTER — HOSPITAL ENCOUNTER (OUTPATIENT)
Dept: GENERAL RADIOLOGY | Facility: HOSPITAL | Age: 82
Discharge: HOME | End: 2022-03-08
Attending: NURSE PRACTITIONER
Payer: COMMERCIAL

## 2022-03-08 ENCOUNTER — HOSPITAL ENCOUNTER (OUTPATIENT)
Dept: ULTRASOUND IMAGING | Facility: HOSPITAL | Age: 82
Discharge: HOME | End: 2022-03-08
Attending: NURSE PRACTITIONER
Payer: COMMERCIAL

## 2022-03-08 ENCOUNTER — OFFICE VISIT (OUTPATIENT)
Dept: INTERNAL MEDICINE CLINIC | Facility: CLINIC | Age: 82
End: 2022-03-08
Payer: COMMERCIAL

## 2022-03-08 ENCOUNTER — LAB ENCOUNTER (OUTPATIENT)
Dept: LAB | Facility: HOSPITAL | Age: 82
End: 2022-03-08
Attending: NURSE PRACTITIONER
Payer: COMMERCIAL

## 2022-03-08 ENCOUNTER — APPOINTMENT (OUTPATIENT)
Dept: CT IMAGING | Facility: HOSPITAL | Age: 82
End: 2022-03-08
Attending: EMERGENCY MEDICINE
Payer: COMMERCIAL

## 2022-03-08 VITALS
HEIGHT: 69 IN | DIASTOLIC BLOOD PRESSURE: 61 MMHG | HEART RATE: 57 BPM | WEIGHT: 180 LBS | RESPIRATION RATE: 16 BRPM | OXYGEN SATURATION: 97 % | TEMPERATURE: 97 F | BODY MASS INDEX: 26.66 KG/M2 | SYSTOLIC BLOOD PRESSURE: 157 MMHG

## 2022-03-08 VITALS
BODY MASS INDEX: 27.25 KG/M2 | SYSTOLIC BLOOD PRESSURE: 124 MMHG | WEIGHT: 184 LBS | DIASTOLIC BLOOD PRESSURE: 74 MMHG | HEIGHT: 69 IN | HEART RATE: 70 BPM | TEMPERATURE: 97 F

## 2022-03-08 DIAGNOSIS — G89.29 CHRONIC BILATERAL LOW BACK PAIN WITHOUT SCIATICA: ICD-10-CM

## 2022-03-08 DIAGNOSIS — M79.89 LEG SWELLING: Primary | ICD-10-CM

## 2022-03-08 DIAGNOSIS — R19.09 GROIN MASS: ICD-10-CM

## 2022-03-08 DIAGNOSIS — R06.02 SOBOE (SHORTNESS OF BREATH ON EXERTION): ICD-10-CM

## 2022-03-08 DIAGNOSIS — R10.32 LEFT GROIN PAIN: ICD-10-CM

## 2022-03-08 DIAGNOSIS — E03.9 ACQUIRED HYPOTHYROIDISM: ICD-10-CM

## 2022-03-08 DIAGNOSIS — M54.50 CHRONIC BILATERAL LOW BACK PAIN WITHOUT SCIATICA: ICD-10-CM

## 2022-03-08 DIAGNOSIS — M79.89 LEG SWELLING: ICD-10-CM

## 2022-03-08 DIAGNOSIS — I25.10 CORONARY ARTERY DISEASE INVOLVING NATIVE CORONARY ARTERY OF NATIVE HEART WITHOUT ANGINA PECTORIS: ICD-10-CM

## 2022-03-08 DIAGNOSIS — E11.9 CONTROLLED TYPE 2 DIABETES MELLITUS WITHOUT COMPLICATION, WITHOUT LONG-TERM CURRENT USE OF INSULIN (HCC): ICD-10-CM

## 2022-03-08 DIAGNOSIS — R41.3 MEMORY CHANGES: ICD-10-CM

## 2022-03-08 DIAGNOSIS — E78.5 DYSLIPIDEMIA: ICD-10-CM

## 2022-03-08 DIAGNOSIS — I82.4Y1 ACUTE DEEP VEIN THROMBOSIS (DVT) OF PROXIMAL VEIN OF RIGHT LOWER EXTREMITY (HCC): Primary | ICD-10-CM

## 2022-03-08 DIAGNOSIS — D69.6 THROMBOCYTOPENIA (HCC): ICD-10-CM

## 2022-03-08 DIAGNOSIS — R59.1 LYMPHADENOPATHY: ICD-10-CM

## 2022-03-08 LAB
ALBUMIN SERPL-MCNC: 3.5 G/DL (ref 3.4–5)
ALBUMIN SERPL-MCNC: 3.7 G/DL (ref 3.4–5)
ALBUMIN/GLOB SERPL: 1.1 {RATIO} (ref 1–2)
ALBUMIN/GLOB SERPL: 1.3 {RATIO} (ref 1–2)
ALP LIVER SERPL-CCNC: 70 U/L
ALP LIVER SERPL-CCNC: 72 U/L
ALT SERPL-CCNC: 17 U/L
ALT SERPL-CCNC: 18 U/L
ANION GAP SERPL CALC-SCNC: 1 MMOL/L (ref 0–18)
ANION GAP SERPL CALC-SCNC: 2 MMOL/L (ref 0–18)
APTT PPP: 29.9 SECONDS (ref 23.3–35.6)
AST SERPL-CCNC: 15 U/L (ref 15–37)
AST SERPL-CCNC: 19 U/L (ref 15–37)
BASOPHILS # BLD AUTO: 0.02 X10(3) UL (ref 0–0.2)
BASOPHILS # BLD AUTO: 0.02 X10(3) UL (ref 0–0.2)
BASOPHILS NFR BLD AUTO: 0.3 %
BASOPHILS NFR BLD AUTO: 0.3 %
BILIRUB SERPL-MCNC: 0.6 MG/DL (ref 0.1–2)
BILIRUB SERPL-MCNC: 0.6 MG/DL (ref 0.1–2)
BUN BLD-MCNC: 22 MG/DL (ref 7–18)
BUN BLD-MCNC: 23 MG/DL (ref 7–18)
CALCIUM BLD-MCNC: 8.9 MG/DL (ref 8.5–10.1)
CALCIUM BLD-MCNC: 9 MG/DL (ref 8.5–10.1)
CHLORIDE SERPL-SCNC: 113 MMOL/L (ref 98–112)
CHLORIDE SERPL-SCNC: 113 MMOL/L (ref 98–112)
CHOLEST SERPL-MCNC: 134 MG/DL (ref ?–200)
CO2 SERPL-SCNC: 28 MMOL/L (ref 21–32)
CO2 SERPL-SCNC: 28 MMOL/L (ref 21–32)
CREAT BLD-MCNC: 1.13 MG/DL
CREAT BLD-MCNC: 1.18 MG/DL
CREAT UR-SCNC: 303 MG/DL
EOSINOPHIL # BLD AUTO: 0.11 X10(3) UL (ref 0–0.7)
EOSINOPHIL # BLD AUTO: 0.14 X10(3) UL (ref 0–0.7)
EOSINOPHIL NFR BLD AUTO: 1.9 %
EOSINOPHIL NFR BLD AUTO: 1.9 %
ERYTHROCYTE [DISTWIDTH] IN BLOOD BY AUTOMATED COUNT: 12.9 %
ERYTHROCYTE [DISTWIDTH] IN BLOOD BY AUTOMATED COUNT: 12.9 %
EST. AVERAGE GLUCOSE BLD GHB EST-MCNC: 123 MG/DL (ref 68–126)
FASTING PATIENT LIPID ANSWER: YES
FASTING STATUS PATIENT QL REPORTED: YES
GLOBULIN PLAS-MCNC: 2.9 G/DL (ref 2.8–4.4)
GLOBULIN PLAS-MCNC: 3.1 G/DL (ref 2.8–4.4)
GLUCOSE BLD-MCNC: 104 MG/DL (ref 70–99)
GLUCOSE BLD-MCNC: 116 MG/DL (ref 70–99)
HBA1C MFR BLD: 5.9 % (ref ?–5.7)
HCT VFR BLD AUTO: 42.8 %
HCT VFR BLD AUTO: 44.9 %
HDLC SERPL-MCNC: 53 MG/DL (ref 40–59)
HGB BLD-MCNC: 14.2 G/DL
HGB BLD-MCNC: 14.6 G/DL
IMM GRANULOCYTES # BLD AUTO: 0.01 X10(3) UL (ref 0–1)
IMM GRANULOCYTES # BLD AUTO: 0.02 X10(3) UL (ref 0–1)
IMM GRANULOCYTES NFR BLD: 0.2 %
INR BLD: 1.03 (ref 0.8–1.2)
LDH SERPL L TO P-CCNC: 175 U/L
LDLC SERPL CALC-MCNC: 64 MG/DL (ref ?–100)
LYMPHOCYTES # BLD AUTO: 1.54 X10(3) UL (ref 1–4)
LYMPHOCYTES # BLD AUTO: 1.88 X10(3) UL (ref 1–4)
LYMPHOCYTES NFR BLD AUTO: 25.6 %
LYMPHOCYTES NFR BLD AUTO: 26.3 %
MCH RBC QN AUTO: 30.8 PG (ref 26–34)
MCH RBC QN AUTO: 30.8 PG (ref 26–34)
MCHC RBC AUTO-ENTMCNC: 32.5 G/DL (ref 31–37)
MCHC RBC AUTO-ENTMCNC: 33.2 G/DL (ref 31–37)
MCV RBC AUTO: 92.8 FL
MCV RBC AUTO: 94.7 FL
MICROALBUMIN UR-MCNC: 2.42 MG/DL
MICROALBUMIN/CREAT 24H UR-RTO: 8 UG/MG (ref ?–30)
MONOCYTES # BLD AUTO: 0.47 X10(3) UL (ref 0.1–1)
MONOCYTES # BLD AUTO: 0.58 X10(3) UL (ref 0.1–1)
MONOCYTES NFR BLD AUTO: 7.9 %
MONOCYTES NFR BLD AUTO: 8 %
NEUTROPHILS # BLD AUTO: 3.71 X10 (3) UL (ref 1.5–7.7)
NEUTROPHILS # BLD AUTO: 3.71 X10(3) UL (ref 1.5–7.7)
NEUTROPHILS # BLD AUTO: 4.71 X10 (3) UL (ref 1.5–7.7)
NEUTROPHILS # BLD AUTO: 4.71 X10(3) UL (ref 1.5–7.7)
NEUTROPHILS NFR BLD AUTO: 63.3 %
NEUTROPHILS NFR BLD AUTO: 64 %
NONHDLC SERPL-MCNC: 81 MG/DL (ref ?–130)
NT-PROBNP SERPL-MCNC: 587 PG/ML (ref ?–450)
NT-PROBNP SERPL-MCNC: 671 PG/ML (ref ?–450)
OSMOLALITY SERPL CALC.SUM OF ELEC: 298 MOSM/KG (ref 275–295)
OSMOLALITY SERPL CALC.SUM OF ELEC: 301 MOSM/KG (ref 275–295)
PLATELET # BLD AUTO: 168 10(3)UL (ref 150–450)
PLATELET # BLD AUTO: 179 10(3)UL (ref 150–450)
POTASSIUM SERPL-SCNC: 4.3 MMOL/L (ref 3.5–5.1)
POTASSIUM SERPL-SCNC: 4.6 MMOL/L (ref 3.5–5.1)
PROT SERPL-MCNC: 6.6 G/DL (ref 6.4–8.2)
PROT SERPL-MCNC: 6.6 G/DL (ref 6.4–8.2)
PROTHROMBIN TIME: 13.5 SECONDS (ref 11.6–14.8)
RBC # BLD AUTO: 4.61 X10(6)UL
RBC # BLD AUTO: 4.74 X10(6)UL
SODIUM SERPL-SCNC: 142 MMOL/L (ref 136–145)
SODIUM SERPL-SCNC: 143 MMOL/L (ref 136–145)
TRIGL SERPL-MCNC: 86 MG/DL (ref 30–149)
TROPONIN I HIGH SENSITIVITY: 10 NG/L
TSI SER-ACNC: 2.9 MIU/ML (ref 0.36–3.74)
VIT B12 SERPL-MCNC: 446 PG/ML (ref 193–986)
VLDLC SERPL CALC-MCNC: 13 MG/DL (ref 0–30)
WBC # BLD AUTO: 7.4 X10(3) UL (ref 4–11)

## 2022-03-08 PROCEDURE — 99284 EMERGENCY DEPT VISIT MOD MDM: CPT

## 2022-03-08 PROCEDURE — 80053 COMPREHEN METABOLIC PANEL: CPT | Performed by: EMERGENCY MEDICINE

## 2022-03-08 PROCEDURE — 82570 ASSAY OF URINE CREATININE: CPT

## 2022-03-08 PROCEDURE — 3074F SYST BP LT 130 MM HG: CPT | Performed by: NURSE PRACTITIONER

## 2022-03-08 PROCEDURE — 3008F BODY MASS INDEX DOCD: CPT | Performed by: NURSE PRACTITIONER

## 2022-03-08 PROCEDURE — 80061 LIPID PANEL: CPT

## 2022-03-08 PROCEDURE — 84484 ASSAY OF TROPONIN QUANT: CPT | Performed by: EMERGENCY MEDICINE

## 2022-03-08 PROCEDURE — 83615 LACTATE (LD) (LDH) ENZYME: CPT

## 2022-03-08 PROCEDURE — 85025 COMPLETE CBC W/AUTO DIFF WBC: CPT | Performed by: EMERGENCY MEDICINE

## 2022-03-08 PROCEDURE — 93971 EXTREMITY STUDY: CPT | Performed by: NURSE PRACTITIONER

## 2022-03-08 PROCEDURE — 85025 COMPLETE CBC W/AUTO DIFF WBC: CPT

## 2022-03-08 PROCEDURE — 82607 VITAMIN B-12: CPT

## 2022-03-08 PROCEDURE — 80053 COMPREHEN METABOLIC PANEL: CPT

## 2022-03-08 PROCEDURE — 71046 X-RAY EXAM CHEST 2 VIEWS: CPT | Performed by: NURSE PRACTITIONER

## 2022-03-08 PROCEDURE — 36415 COLL VENOUS BLD VENIPUNCTURE: CPT

## 2022-03-08 PROCEDURE — 72110 X-RAY EXAM L-2 SPINE 4/>VWS: CPT | Performed by: NURSE PRACTITIONER

## 2022-03-08 PROCEDURE — 71275 CT ANGIOGRAPHY CHEST: CPT | Performed by: EMERGENCY MEDICINE

## 2022-03-08 PROCEDURE — 82043 UR ALBUMIN QUANTITATIVE: CPT

## 2022-03-08 PROCEDURE — 83036 HEMOGLOBIN GLYCOSYLATED A1C: CPT

## 2022-03-08 PROCEDURE — 85610 PROTHROMBIN TIME: CPT | Performed by: EMERGENCY MEDICINE

## 2022-03-08 PROCEDURE — 83880 ASSAY OF NATRIURETIC PEPTIDE: CPT | Performed by: EMERGENCY MEDICINE

## 2022-03-08 PROCEDURE — 83880 ASSAY OF NATRIURETIC PEPTIDE: CPT

## 2022-03-08 PROCEDURE — 85730 THROMBOPLASTIN TIME PARTIAL: CPT | Performed by: EMERGENCY MEDICINE

## 2022-03-08 PROCEDURE — 99214 OFFICE O/P EST MOD 30 MIN: CPT | Performed by: NURSE PRACTITIONER

## 2022-03-08 PROCEDURE — 3078F DIAST BP <80 MM HG: CPT | Performed by: NURSE PRACTITIONER

## 2022-03-08 PROCEDURE — 84443 ASSAY THYROID STIM HORMONE: CPT

## 2022-03-08 RX ORDER — IOHEXOL 350 MG/ML
100 INJECTION, SOLUTION INTRAVENOUS
Status: COMPLETED | OUTPATIENT
Start: 2022-03-08 | End: 2022-03-08

## 2022-03-08 NOTE — ED INITIAL ASSESSMENT (HPI)
Pt ambulatory to er with family   Sent for dvt at [facility] today in [place]  Pain = \"not much\"  Swelling x 5 wks per pt  On bld thinners for 4 yrs

## 2022-03-09 ENCOUNTER — TELEPHONE (OUTPATIENT)
Dept: HEMATOLOGY/ONCOLOGY | Facility: HOSPITAL | Age: 82
End: 2022-03-09

## 2022-03-09 NOTE — TELEPHONE ENCOUNTER
I tried calling patient back to see if they would see another provider, but no answer or option to leave a message.

## 2022-03-09 NOTE — TELEPHONE ENCOUNTER
Patient was in the ER at [facility] last night. He was referred to Dr. Aisha Gomez. Possible blood clot in his right leg. Patient was told he needs to be seen within the next 5 days. Please help me find a consult earlier than 4/5/22. Please advise. Thank you.

## 2022-03-10 ENCOUNTER — HOSPITAL ENCOUNTER (OUTPATIENT)
Dept: ULTRASOUND IMAGING | Facility: HOSPITAL | Age: 82
Discharge: HOME | End: 2022-03-10
Attending: NURSE PRACTITIONER
Payer: COMMERCIAL

## 2022-03-10 DIAGNOSIS — R19.09 GROIN MASS: ICD-10-CM

## 2022-03-10 PROCEDURE — 76882 US LMTD JT/FCL EVL NVASC XTR: CPT | Performed by: NURSE PRACTITIONER

## 2022-03-15 ENCOUNTER — OFFICE VISIT (OUTPATIENT)
Dept: INTERNAL MEDICINE CLINIC | Facility: CLINIC | Age: 82
End: 2022-03-15
Payer: COMMERCIAL

## 2022-03-15 ENCOUNTER — HOSPITAL ENCOUNTER (OUTPATIENT)
Dept: GENERAL RADIOLOGY | Age: 82
Discharge: HOME | End: 2022-03-15
Attending: NURSE PRACTITIONER
Payer: COMMERCIAL

## 2022-03-15 VITALS
WEIGHT: 181 LBS | HEART RATE: 74 BPM | HEIGHT: 69 IN | TEMPERATURE: 98 F | BODY MASS INDEX: 26.81 KG/M2 | SYSTOLIC BLOOD PRESSURE: 122 MMHG | DIASTOLIC BLOOD PRESSURE: 78 MMHG

## 2022-03-15 DIAGNOSIS — E78.5 DYSLIPIDEMIA: ICD-10-CM

## 2022-03-15 DIAGNOSIS — D17.24 LIPOMA OF LEFT LOWER EXTREMITY: ICD-10-CM

## 2022-03-15 DIAGNOSIS — R91.8 PULMONARY NODULES: ICD-10-CM

## 2022-03-15 DIAGNOSIS — M51.36 LUMBAR DEGENERATIVE DISC DISEASE: ICD-10-CM

## 2022-03-15 DIAGNOSIS — D71 GRANULOMATOUS DISEASE (HCC): ICD-10-CM

## 2022-03-15 DIAGNOSIS — K22.89 ESOPHAGEAL THICKENING: ICD-10-CM

## 2022-03-15 DIAGNOSIS — I25.10 CORONARY ARTERY DISEASE INVOLVING NATIVE CORONARY ARTERY OF NATIVE HEART WITHOUT ANGINA PECTORIS: ICD-10-CM

## 2022-03-15 DIAGNOSIS — E11.9 CONTROLLED TYPE 2 DIABETES MELLITUS WITHOUT COMPLICATION, WITHOUT LONG-TERM CURRENT USE OF INSULIN (HCC): ICD-10-CM

## 2022-03-15 DIAGNOSIS — K63.5 POLYP OF COLON, UNSPECIFIED PART OF COLON, UNSPECIFIED TYPE: ICD-10-CM

## 2022-03-15 DIAGNOSIS — M25.552 LEFT HIP PAIN: ICD-10-CM

## 2022-03-15 DIAGNOSIS — M25.552 LEFT HIP PAIN: Primary | ICD-10-CM

## 2022-03-15 DIAGNOSIS — I82.4Y1 ACUTE DEEP VEIN THROMBOSIS (DVT) OF PROXIMAL VEIN OF RIGHT LOWER EXTREMITY (HCC): ICD-10-CM

## 2022-03-15 PROBLEM — M51.369 LUMBAR DEGENERATIVE DISC DISEASE: Status: ACTIVE | Noted: 2022-03-15

## 2022-03-15 PROCEDURE — 3074F SYST BP LT 130 MM HG: CPT | Performed by: NURSE PRACTITIONER

## 2022-03-15 PROCEDURE — 99214 OFFICE O/P EST MOD 30 MIN: CPT | Performed by: NURSE PRACTITIONER

## 2022-03-15 PROCEDURE — 3078F DIAST BP <80 MM HG: CPT | Performed by: NURSE PRACTITIONER

## 2022-03-15 PROCEDURE — 3008F BODY MASS INDEX DOCD: CPT | Performed by: NURSE PRACTITIONER

## 2022-03-15 PROCEDURE — 73502 X-RAY EXAM HIP UNI 2-3 VIEWS: CPT | Performed by: NURSE PRACTITIONER

## 2022-03-15 RX ORDER — NICOTINE POLACRILEX 4 MG/1
20 GUM, CHEWING ORAL DAILY
Qty: 90 TABLET | Refills: 0 | Status: SHIPPED | OUTPATIENT
Start: 2022-03-15 | End: 2022-06-13

## 2022-03-19 ENCOUNTER — HOSPITAL ENCOUNTER (OUTPATIENT)
Dept: CV DIAGNOSTICS | Facility: HOSPITAL | Age: 82
Discharge: HOME | End: 2022-03-19
Attending: NURSE PRACTITIONER
Payer: COMMERCIAL

## 2022-03-19 DIAGNOSIS — I25.10 CORONARY ARTERY DISEASE INVOLVING NATIVE CORONARY ARTERY OF NATIVE HEART WITHOUT ANGINA PECTORIS: ICD-10-CM

## 2022-03-19 DIAGNOSIS — R06.02 SOBOE (SHORTNESS OF BREATH ON EXERTION): ICD-10-CM

## 2022-03-19 DIAGNOSIS — M79.89 LEG SWELLING: ICD-10-CM

## 2022-03-19 PROCEDURE — 93306 TTE W/DOPPLER COMPLETE: CPT | Performed by: NURSE PRACTITIONER

## 2022-03-24 ENCOUNTER — TELEPHONE (OUTPATIENT)
Dept: INTERNAL MEDICINE CLINIC | Facility: CLINIC | Age: 82
End: 2022-03-24

## 2022-03-24 RX ORDER — ALFUZOSIN HYDROCHLORIDE 10 MG/1
10 TABLET, EXTENDED RELEASE ORAL DAILY
Qty: 90 TABLET | Refills: 1 | Status: SHIPPED | OUTPATIENT
Start: 2022-03-24 | End: 2022-09-19

## 2022-03-24 NOTE — TELEPHONE ENCOUNTER
Pt is currently at [place] [address] office and they have him scheduled for a crown re-cemented.  Pt is on blood thinners and they need the OK to cont with appt

## 2022-03-24 NOTE — TELEPHONE ENCOUNTER
Spoke with dental office. They did not complete procedure today they are referring patient to oral surgeon r/t cracked tooth. They asked if patient is able to schedule and complete a dental cleaning. Their office was notified that if preventative can delay until follow up with heme given recent medication changes. They denies questions.

## 2022-03-24 NOTE — TELEPHONE ENCOUNTER
He has an ov with hematology April 4 to determine duration of anticoagulation. Hoping some of these procedures can be delayed.

## 2022-03-24 NOTE — TELEPHONE ENCOUNTER
Last visit- 03/15/2022 type 2 diabetes     Last refill- 03/01/2021 alfuzosin hcl er 10mg QTY90 3R    Last labs- 03/08/2022 vitamin b12, tsh, ldh, cbc, microalb, hemoglobin a1c, lipid, cmp   Future Appointments   Date Time Provider Melanie Yeh   4/4/2022  3:00 PM Raechel Lombard, MD [address]       Per Protocol?   Please approve or deny

## 2022-04-04 ENCOUNTER — OFFICE VISIT (OUTPATIENT)
Dept: HEMATOLOGY/ONCOLOGY | Facility: HOSPITAL | Age: 82
End: 2022-04-04
Attending: INTERNAL MEDICINE
Payer: COMMERCIAL

## 2022-04-04 VITALS
HEIGHT: 66.89 IN | RESPIRATION RATE: 18 BRPM | TEMPERATURE: 98 F | OXYGEN SATURATION: 100 % | SYSTOLIC BLOOD PRESSURE: 121 MMHG | BODY MASS INDEX: 28.09 KG/M2 | WEIGHT: 179 LBS | DIASTOLIC BLOOD PRESSURE: 76 MMHG | HEART RATE: 92 BPM

## 2022-04-04 DIAGNOSIS — Z92.29 HX: ANTICOAGULATION: ICD-10-CM

## 2022-04-04 DIAGNOSIS — I82.4Y1 ACUTE DEEP VEIN THROMBOSIS (DVT) OF PROXIMAL VEIN OF RIGHT LOWER EXTREMITY (HCC): Primary | ICD-10-CM

## 2022-04-04 PROCEDURE — 99205 OFFICE O/P NEW HI 60 MIN: CPT | Performed by: INTERNAL MEDICINE

## 2022-04-04 NOTE — PATIENT INSTRUCTIONS
For triage nurse: [phone] Monday through Friday 7:30-5:00.  *Please note this is a new phone number*    After hours or weekends for emergent needs:  [phone].      To schedule diagnostic testing: Central [phone]    For Medical Records: [phone]

## 2022-04-12 RX ORDER — ATORVASTATIN CALCIUM 20 MG/1
TABLET, FILM COATED ORAL
Qty: 90 TABLET | Refills: 0 | Status: SHIPPED | OUTPATIENT
Start: 2022-04-12

## 2022-04-12 NOTE — TELEPHONE ENCOUNTER
Pt due to annual please call to   Last PE: 2/6/21    No future appointments. Per protocol, passed. Rx sent.

## 2022-04-18 ENCOUNTER — OFFICE VISIT (OUTPATIENT)
Dept: OTOLARYNGOLOGY | Facility: CLINIC | Age: 82
End: 2022-04-18
Payer: COMMERCIAL

## 2022-04-18 VITALS — HEIGHT: 66 IN | WEIGHT: 179 LBS | BODY MASS INDEX: 28.77 KG/M2

## 2022-04-18 DIAGNOSIS — H61.23 BILATERAL IMPACTED CERUMEN: Primary | ICD-10-CM

## 2022-04-18 PROCEDURE — 3008F BODY MASS INDEX DOCD: CPT | Performed by: OTOLARYNGOLOGY

## 2022-04-18 PROCEDURE — 99203 OFFICE O/P NEW LOW 30 MIN: CPT | Performed by: OTOLARYNGOLOGY

## 2022-04-19 ENCOUNTER — OFFICE VISIT (OUTPATIENT)
Dept: INTERNAL MEDICINE CLINIC | Facility: CLINIC | Age: 82
End: 2022-04-19
Payer: COMMERCIAL

## 2022-04-19 VITALS
WEIGHT: 180 LBS | HEIGHT: 66 IN | BODY MASS INDEX: 28.93 KG/M2 | HEART RATE: 74 BPM | DIASTOLIC BLOOD PRESSURE: 56 MMHG | TEMPERATURE: 98 F | SYSTOLIC BLOOD PRESSURE: 118 MMHG

## 2022-04-19 DIAGNOSIS — I82.4Y1 ACUTE DEEP VEIN THROMBOSIS (DVT) OF PROXIMAL VEIN OF RIGHT LOWER EXTREMITY (HCC): ICD-10-CM

## 2022-04-19 DIAGNOSIS — M51.36 LUMBAR DEGENERATIVE DISC DISEASE: Primary | ICD-10-CM

## 2022-04-19 DIAGNOSIS — E78.5 DYSLIPIDEMIA: ICD-10-CM

## 2022-04-19 DIAGNOSIS — I25.10 CORONARY ARTERY DISEASE INVOLVING NATIVE CORONARY ARTERY OF NATIVE HEART WITHOUT ANGINA PECTORIS: ICD-10-CM

## 2022-04-19 DIAGNOSIS — E11.9 CONTROLLED TYPE 2 DIABETES MELLITUS WITHOUT COMPLICATION, WITHOUT LONG-TERM CURRENT USE OF INSULIN (HCC): ICD-10-CM

## 2022-04-19 PROCEDURE — 3074F SYST BP LT 130 MM HG: CPT | Performed by: NURSE PRACTITIONER

## 2022-04-19 PROCEDURE — 3078F DIAST BP <80 MM HG: CPT | Performed by: NURSE PRACTITIONER

## 2022-04-19 PROCEDURE — 99214 OFFICE O/P EST MOD 30 MIN: CPT | Performed by: NURSE PRACTITIONER

## 2022-04-19 PROCEDURE — 3008F BODY MASS INDEX DOCD: CPT | Performed by: NURSE PRACTITIONER

## 2022-05-03 ENCOUNTER — PATIENT MESSAGE (OUTPATIENT)
Dept: INTERNAL MEDICINE CLINIC | Facility: CLINIC | Age: 82
End: 2022-05-03

## 2022-05-03 RX ORDER — CLOPIDOGREL BISULFATE 75 MG/1
TABLET ORAL
Qty: 90 TABLET | Refills: 3 | OUTPATIENT
Start: 2022-05-03

## 2022-05-03 NOTE — TELEPHONE ENCOUNTER
Last REFILL   Medication Quantity Refills Start End   clopidogrel 75 MG Oral Tab 90 tablet 3 11/11/2021      Pt has yr supply   Denying request

## 2022-05-04 RX ORDER — CLOPIDOGREL BISULFATE 75 MG/1
75 TABLET ORAL DAILY
Qty: 30 TABLET | Refills: 0 | Status: SHIPPED | OUTPATIENT
Start: 2022-05-04

## 2022-05-04 NOTE — TELEPHONE ENCOUNTER
Last OV 4/19/22  Last PE 2/6/21  Last REFILL   Medication Quantity Refills Start End   clopidogrel 75 MG Oral Tab 30 tablet 0 5/4/2022      Last LABS 3/8/22 tsh, cbc, microalb, a1c, lipid, cmp    Future Appointments   Date Time Provider Melanie Yeh   6/14/2022  4:30 PM Francheska Thomas [address]         Per PROTOCOL?   Not on protocol   Pt wants 90 day suply    Please Advise

## 2022-05-04 NOTE — TELEPHONE ENCOUNTER
From: Torin Rodríguez  To: CARLY Snyder  Sent: 5/3/2022 3:09 PM CDT  Subject: Clopidogrel 75 mg    Hi,  I run out of of the medicine above. Bala informed me that it was not authorized by my physician. Could you please send an order to the pharmacy? As far as I know I should continue taking this medicine (for ever?) Let me know if this is not the case. Thank you  Gabriele Cummings.

## 2022-05-04 NOTE — TELEPHONE ENCOUNTER
Please see my previous note. Needs to be filled by cardiology  if he wants 90 days, he should contact them.   Gave him med to hold him over until he gets script from cardiology

## 2022-05-04 NOTE — TELEPHONE ENCOUNTER
I filled 30 days. He follows with cardiology  this medication should be filled by them in the future. Please have him call their office for 90 day supply.

## 2022-05-06 RX ORDER — CLOPIDOGREL BISULFATE 75 MG/1
TABLET ORAL
Qty: 90 TABLET | Refills: 0 | OUTPATIENT
Start: 2022-05-06

## 2022-06-02 ENCOUNTER — PATIENT MESSAGE (OUTPATIENT)
Dept: INTERNAL MEDICINE CLINIC | Facility: CLINIC | Age: 82
End: 2022-06-02

## 2022-06-02 RX ORDER — LEVOTHYROXINE SODIUM 0.03 MG/1
TABLET ORAL
Qty: 90 TABLET | Refills: 1 | Status: SHIPPED | OUTPATIENT
Start: 2022-06-02 | End: 2022-12-05

## 2022-06-02 RX ORDER — CLOPIDOGREL BISULFATE 75 MG/1
TABLET ORAL
Qty: 30 TABLET | Refills: 0 | OUTPATIENT
Start: 2022-06-02

## 2022-06-02 NOTE — TELEPHONE ENCOUNTER
Levothyroxine filled   See previous TE for Plavix. Should be filled by cardiology. I agree to fill for 1 week to avoid him being out but script was refused in order to have him obtain refills from cardiology   Please call patient.   Due for CPE and follow up

## 2022-06-03 NOTE — TELEPHONE ENCOUNTER
Spoke patient and before I can advise pt was self aware that refill goes through his cardiologist and he is going to contact for refill. He thought message was sent to cardio not our office. Closing TE nothing further needed.

## 2022-06-03 NOTE — TELEPHONE ENCOUNTER
From: Danielle Mata  To: Waldo Meckel, APRN  Sent: 6/2/2022 11:27 AM CDT  Subject: Copidogrel refill    I was informed by my pharmacy [facility]) that you refused to authorize a refill of Copidogrel. I am going out of town for a few days and I need a refill in order not to discontinue the medicine. In case your refusal is due to the fact that I don't need to take the medicine any longer, I would appreciate if you inform me so, either via E-mail or [facility]. It was my understanding that this medicine is very important for my health. Thank you. I would appreciate your prompt response.

## 2022-06-14 ENCOUNTER — HOSPITAL ENCOUNTER (OUTPATIENT)
Dept: ULTRASOUND IMAGING | Facility: HOSPITAL | Age: 82
Discharge: HOME | End: 2022-06-14
Attending: INTERNAL MEDICINE
Payer: COMMERCIAL

## 2022-06-14 DIAGNOSIS — I82.4Y1 ACUTE DEEP VEIN THROMBOSIS (DVT) OF PROXIMAL VEIN OF RIGHT LOWER EXTREMITY (HCC): ICD-10-CM

## 2022-06-14 PROCEDURE — 93971 EXTREMITY STUDY: CPT | Performed by: INTERNAL MEDICINE

## 2022-06-15 DIAGNOSIS — I82.4Y1 ACUTE DEEP VEIN THROMBOSIS (DVT) OF PROXIMAL VEIN OF RIGHT LOWER EXTREMITY (HCC): Primary | ICD-10-CM

## 2022-06-16 ENCOUNTER — LAB ENCOUNTER (OUTPATIENT)
Dept: LAB | Age: 82
End: 2022-06-16
Attending: INTERNAL MEDICINE
Payer: COMMERCIAL

## 2022-06-16 DIAGNOSIS — I82.4Y1 ACUTE DEEP VEIN THROMBOSIS (DVT) OF PROXIMAL VEIN OF RIGHT LOWER EXTREMITY (HCC): ICD-10-CM

## 2022-06-16 LAB — D DIMER PPP FEU-MCNC: <0.27 UG/ML FEU (ref ?–0.82)

## 2022-06-16 PROCEDURE — 85379 FIBRIN DEGRADATION QUANT: CPT

## 2022-06-16 PROCEDURE — 36415 COLL VENOUS BLD VENIPUNCTURE: CPT

## 2022-06-17 ENCOUNTER — TELEPHONE (OUTPATIENT)
Dept: HEMATOLOGY/ONCOLOGY | Facility: HOSPITAL | Age: 82
End: 2022-06-17

## 2022-06-17 DIAGNOSIS — I82.4Y1 ACUTE DEEP VEIN THROMBOSIS (DVT) OF PROXIMAL VEIN OF RIGHT LOWER EXTREMITY (HCC): Primary | ICD-10-CM

## 2022-06-17 NOTE — TELEPHONE ENCOUNTER
Not bed bound though activity limited due to bad back and arthritis. Able to do ADLs, walks around and does stairs. Will finish up Eliquis he has then stop. Cannot take ASA once off Eliquis as allergic. He is on Plavix. Plan to repeat d dimer in 4 weeks. If high may need to reconsider anticoagulation due to higher risk. He verbalized understanding.

## 2022-07-11 RX ORDER — ATORVASTATIN CALCIUM 20 MG/1
TABLET, FILM COATED ORAL
Qty: 90 TABLET | Refills: 0 | Status: SHIPPED | OUTPATIENT
Start: 2022-07-11

## 2022-07-15 RX ORDER — METOPROLOL SUCCINATE 50 MG/1
25 TABLET, EXTENDED RELEASE ORAL DAILY
Qty: 90 TABLET | Refills: 2 | Status: CANCELLED | OUTPATIENT
Start: 2022-07-15

## 2022-07-19 RX ORDER — METOPROLOL SUCCINATE 50 MG/1
TABLET, EXTENDED RELEASE ORAL
Qty: 90 TABLET | Refills: 2 | Status: SHIPPED | OUTPATIENT
Start: 2022-07-19

## 2022-09-19 RX ORDER — ALFUZOSIN HYDROCHLORIDE 10 MG/1
10 TABLET, EXTENDED RELEASE ORAL DAILY
Qty: 90 TABLET | Refills: 2 | Status: SHIPPED | OUTPATIENT
Start: 2022-09-19

## 2022-09-19 NOTE — TELEPHONE ENCOUNTER
Please call to schedule annual. Pt is overdue. Last OV: 4/19/22 f/u  Last PE: 2/6/21    No future appointments. Latest labs: 3/8/22 Vit B12, TSH, LDH, CBC, Microalb, A1c, Lipid and CMP    Latest RX: ALFUZOSIN ER 10MG TABLETS 90 tabs 1 refill on 3/24/22    Per protocol, not on protocol. Rx pending.

## 2022-10-03 RX ORDER — ATORVASTATIN CALCIUM 20 MG/1
TABLET, FILM COATED ORAL
Qty: 90 TABLET | Refills: 0 | Status: SHIPPED | OUTPATIENT
Start: 2022-10-03

## 2022-11-15 ENCOUNTER — E-VISIT (OUTPATIENT)
Dept: TELEHEALTH | Age: 82
End: 2022-11-15

## 2022-11-15 DIAGNOSIS — U07.1 POSITIVE SELF-ADMINISTERED ANTIGEN TEST FOR COVID-19: Primary | ICD-10-CM

## 2022-11-15 PROCEDURE — 99422 OL DIG E/M SVC 11-20 MIN: CPT | Performed by: NURSE PRACTITIONER

## 2022-11-21 ENCOUNTER — LAB ENCOUNTER (OUTPATIENT)
Dept: LAB | Age: 82
End: 2022-11-21
Attending: INTERNAL MEDICINE
Payer: COMMERCIAL

## 2022-11-21 DIAGNOSIS — E78.5 HYPERLIPEMIA: ICD-10-CM

## 2022-11-21 DIAGNOSIS — I82.4Y1 ACUTE DEEP VEIN THROMBOSIS (DVT) OF PROXIMAL VEIN OF RIGHT LOWER EXTREMITY (HCC): ICD-10-CM

## 2022-11-21 DIAGNOSIS — I82.4Y1 ACUTE DEEP VEIN THROMBOSIS (DVT) OF PROXIMAL VEIN OF RIGHT LOWER EXTREMITY (HCC): Primary | ICD-10-CM

## 2022-11-21 DIAGNOSIS — E11.9 DIABETES MELLITUS (HCC): Primary | ICD-10-CM

## 2022-11-21 DIAGNOSIS — E55.9 AVITAMINOSIS D: ICD-10-CM

## 2022-11-21 LAB
ALBUMIN SERPL-MCNC: 3.6 G/DL (ref 3.4–5)
ALBUMIN/GLOB SERPL: 1.2 {RATIO} (ref 1–2)
ALP LIVER SERPL-CCNC: 80 U/L
ALT SERPL-CCNC: 25 U/L
ANION GAP SERPL CALC-SCNC: 7 MMOL/L (ref 0–18)
AST SERPL-CCNC: 22 U/L (ref 15–37)
BILIRUB SERPL-MCNC: 0.7 MG/DL (ref 0.1–2)
BUN BLD-MCNC: 24 MG/DL (ref 7–18)
BUN/CREAT SERPL: 20.2 (ref 10–20)
CALCIUM BLD-MCNC: 9.1 MG/DL (ref 8.5–10.1)
CHLORIDE SERPL-SCNC: 111 MMOL/L (ref 98–112)
CHOLEST SERPL-MCNC: 128 MG/DL (ref ?–200)
CO2 SERPL-SCNC: 26 MMOL/L (ref 21–32)
CREAT BLD-MCNC: 1.19 MG/DL
D DIMER PPP FEU-MCNC: 0.65 UG/ML FEU (ref ?–0.83)
FASTING PATIENT LIPID ANSWER: YES
FASTING STATUS PATIENT QL REPORTED: YES
GFR SERPLBLD BASED ON 1.73 SQ M-ARVRAT: 61 ML/MIN/1.73M2 (ref 60–?)
GLOBULIN PLAS-MCNC: 3 G/DL (ref 2.8–4.4)
GLUCOSE BLD-MCNC: 125 MG/DL (ref 70–99)
HDLC SERPL-MCNC: 44 MG/DL (ref 40–59)
LDLC SERPL CALC-MCNC: 62 MG/DL (ref ?–100)
NONHDLC SERPL-MCNC: 84 MG/DL (ref ?–130)
OSMOLALITY SERPL CALC.SUM OF ELEC: 304 MOSM/KG (ref 275–295)
POTASSIUM SERPL-SCNC: 4.8 MMOL/L (ref 3.5–5.1)
PROT SERPL-MCNC: 6.6 G/DL (ref 6.4–8.2)
SODIUM SERPL-SCNC: 144 MMOL/L (ref 136–145)
TRIGL SERPL-MCNC: 125 MG/DL (ref 30–149)
VLDLC SERPL CALC-MCNC: 19 MG/DL (ref 0–30)

## 2022-11-21 PROCEDURE — 80061 LIPID PANEL: CPT

## 2022-11-21 PROCEDURE — 85379 FIBRIN DEGRADATION QUANT: CPT

## 2022-11-21 PROCEDURE — 80053 COMPREHEN METABOLIC PANEL: CPT

## 2022-11-21 PROCEDURE — 36415 COLL VENOUS BLD VENIPUNCTURE: CPT

## 2022-12-05 RX ORDER — LEVOTHYROXINE SODIUM 0.03 MG/1
TABLET ORAL
Qty: 90 TABLET | Refills: 0 | Status: SHIPPED | OUTPATIENT
Start: 2022-12-05

## 2022-12-05 NOTE — TELEPHONE ENCOUNTER
[facility] are overdue for a physical exam. Your Levothyroxine was refilled for 3 months. You will need to contact our office and schedule an appointment for a physical in order to continue getting further refills.     Thank you,    Arpit Milton ([address] [place] [address])

## 2023-01-05 RX ORDER — ATORVASTATIN CALCIUM 20 MG/1
TABLET, FILM COATED ORAL
Qty: 90 TABLET | Refills: 0 | Status: SHIPPED | OUTPATIENT
Start: 2023-01-05

## 2023-01-06 ENCOUNTER — TELEPHONE (OUTPATIENT)
Dept: INTERNAL MEDICINE CLINIC | Facility: CLINIC | Age: 83
End: 2023-01-06

## 2023-01-06 DIAGNOSIS — E03.9 ACQUIRED HYPOTHYROIDISM: ICD-10-CM

## 2023-01-06 DIAGNOSIS — E11.9 CONTROLLED TYPE 2 DIABETES MELLITUS WITHOUT COMPLICATION, WITHOUT LONG-TERM CURRENT USE OF INSULIN (HCC): ICD-10-CM

## 2023-01-06 DIAGNOSIS — E78.5 DYSLIPIDEMIA: Primary | ICD-10-CM

## 2023-01-06 NOTE — TELEPHONE ENCOUNTER
Future Appointments   Date Time Provider Melanie Ruba   1/30/2023  1:00 PM CARLY Arzola [facility]     Informed must fast no call back required.  Orders to Mary

## 2023-01-16 ENCOUNTER — HOSPITAL ENCOUNTER (OUTPATIENT)
Dept: CT IMAGING | Facility: HOSPITAL | Age: 83
Discharge: HOME | End: 2023-01-16
Attending: NURSE PRACTITIONER
Payer: COMMERCIAL

## 2023-01-16 DIAGNOSIS — R91.8 PULMONARY NODULES: ICD-10-CM

## 2023-01-16 PROCEDURE — 71250 CT THORAX DX C-: CPT | Performed by: NURSE PRACTITIONER

## 2023-01-24 ENCOUNTER — MED REC SCAN ONLY (OUTPATIENT)
Dept: INTERNAL MEDICINE CLINIC | Facility: CLINIC | Age: 83
End: 2023-01-24

## 2023-01-30 ENCOUNTER — OFFICE VISIT (OUTPATIENT)
Dept: INTERNAL MEDICINE CLINIC | Facility: CLINIC | Age: 83
End: 2023-01-30
Payer: COMMERCIAL

## 2023-01-30 VITALS
HEART RATE: 68 BPM | DIASTOLIC BLOOD PRESSURE: 74 MMHG | OXYGEN SATURATION: 96 % | WEIGHT: 184.63 LBS | BODY MASS INDEX: 29.67 KG/M2 | SYSTOLIC BLOOD PRESSURE: 126 MMHG | RESPIRATION RATE: 18 BRPM | HEIGHT: 66 IN

## 2023-01-30 DIAGNOSIS — Z86.718 HISTORY OF DVT IN ADULTHOOD: ICD-10-CM

## 2023-01-30 DIAGNOSIS — R91.8 PULMONARY NODULES: ICD-10-CM

## 2023-01-30 DIAGNOSIS — E11.9 CONTROLLED TYPE 2 DIABETES MELLITUS WITHOUT COMPLICATION, WITHOUT LONG-TERM CURRENT USE OF INSULIN (HCC): ICD-10-CM

## 2023-01-30 DIAGNOSIS — M51.36 LUMBAR DEGENERATIVE DISC DISEASE: ICD-10-CM

## 2023-01-30 DIAGNOSIS — I70.0 ATHEROSCLEROSIS OF AORTA (HCC): ICD-10-CM

## 2023-01-30 DIAGNOSIS — J47.9 BRONCHIECTASIS WITHOUT COMPLICATION (HCC): ICD-10-CM

## 2023-01-30 DIAGNOSIS — Z95.1 S/P CABG (CORONARY ARTERY BYPASS GRAFT): ICD-10-CM

## 2023-01-30 DIAGNOSIS — K22.89 ESOPHAGEAL THICKENING: ICD-10-CM

## 2023-01-30 DIAGNOSIS — Z00.00 ENCOUNTER FOR ANNUAL HEALTH EXAMINATION: Primary | ICD-10-CM

## 2023-01-30 DIAGNOSIS — E55.9 VITAMIN D DEFICIENCY: ICD-10-CM

## 2023-01-30 DIAGNOSIS — N40.0 BENIGN PROSTATIC HYPERPLASIA, UNSPECIFIED WHETHER LOWER URINARY TRACT SYMPTOMS PRESENT: ICD-10-CM

## 2023-01-30 DIAGNOSIS — I25.10 CORONARY ARTERY DISEASE INVOLVING NATIVE CORONARY ARTERY OF NATIVE HEART WITHOUT ANGINA PECTORIS: ICD-10-CM

## 2023-01-30 DIAGNOSIS — R06.02 SOB (SHORTNESS OF BREATH) ON EXERTION: ICD-10-CM

## 2023-01-30 DIAGNOSIS — E03.9 ACQUIRED HYPOTHYROIDISM: ICD-10-CM

## 2023-01-30 DIAGNOSIS — E78.5 DYSLIPIDEMIA: ICD-10-CM

## 2023-01-30 DIAGNOSIS — I25.2 HISTORY OF MYOCARDIAL INFARCTION: ICD-10-CM

## 2023-01-30 DIAGNOSIS — J84.10 CALCIFIED GRANULOMA OF LUNG (HCC): ICD-10-CM

## 2023-01-30 DIAGNOSIS — N43.3 HYDROCELE IN ADULT: ICD-10-CM

## 2023-01-30 DIAGNOSIS — D69.6 THROMBOCYTOPENIA (HCC): ICD-10-CM

## 2023-01-30 PROBLEM — D71 GRANULOMATOUS DISEASE (HCC): Status: RESOLVED | Noted: 2022-03-15 | Resolved: 2023-01-30

## 2023-01-30 PROBLEM — J98.4 CALCIFIED GRANULOMA OF LUNG: Status: ACTIVE | Noted: 2023-01-30

## 2023-01-30 PROCEDURE — 3008F BODY MASS INDEX DOCD: CPT | Performed by: NURSE PRACTITIONER

## 2023-01-30 PROCEDURE — 3074F SYST BP LT 130 MM HG: CPT | Performed by: NURSE PRACTITIONER

## 2023-01-30 PROCEDURE — 99397 PER PM REEVAL EST PAT 65+ YR: CPT | Performed by: NURSE PRACTITIONER

## 2023-01-30 PROCEDURE — 3078F DIAST BP <80 MM HG: CPT | Performed by: NURSE PRACTITIONER

## 2023-01-30 NOTE — PROGRESS NOTES
CPE - Completed 1/30/2023  LDL - Due Order Entered 1/7/2023      DIABETIC PT  Eye Exam - Due Referral Pended  Foot Exam - Due Today   Micro - Due Ordered Ordered 1/7/2023  A1C - Due Ordered 1/7/2023      Patient is due for yearly foot examination. Advised patient to remove his/her shoes. Diabetic flow sheet updated.

## 2023-03-01 RX ORDER — LEVOTHYROXINE SODIUM 0.03 MG/1
TABLET ORAL
Qty: 90 TABLET | Refills: 0 | Status: SHIPPED | OUTPATIENT
Start: 2023-03-01

## 2023-04-17 RX ORDER — ATORVASTATIN CALCIUM 20 MG/1
TABLET, FILM COATED ORAL
Qty: 90 TABLET | Refills: 0 | Status: SHIPPED | OUTPATIENT
Start: 2023-04-17

## 2023-05-15 RX ORDER — LEVOTHYROXINE SODIUM 0.03 MG/1
TABLET ORAL
Qty: 90 TABLET | Refills: 0 | Status: SHIPPED | OUTPATIENT
Start: 2023-05-15

## 2023-05-17 NOTE — TELEPHONE ENCOUNTER
Pt informed.  He scheduled his 6 month follow up   Future Appointments   Date Time Provider Melanie Yeh   7/3/2023 10:00 AM CARLY Ramirez [facility]

## 2023-06-13 ENCOUNTER — LAB ENCOUNTER (OUTPATIENT)
Dept: LAB | Age: 83
End: 2023-06-13
Attending: NURSE PRACTITIONER
Payer: COMMERCIAL

## 2023-06-13 DIAGNOSIS — E78.5 DYSLIPIDEMIA: ICD-10-CM

## 2023-06-13 DIAGNOSIS — E11.9 CONTROLLED TYPE 2 DIABETES MELLITUS WITHOUT COMPLICATION, WITHOUT LONG-TERM CURRENT USE OF INSULIN (HCC): ICD-10-CM

## 2023-06-13 DIAGNOSIS — E03.9 ACQUIRED HYPOTHYROIDISM: ICD-10-CM

## 2023-06-13 LAB
ALBUMIN SERPL-MCNC: 3.5 G/DL (ref 3.4–5)
ALBUMIN/GLOB SERPL: 1 {RATIO} (ref 1–2)
ALP LIVER SERPL-CCNC: 71 U/L
ALT SERPL-CCNC: 22 U/L
ANION GAP SERPL CALC-SCNC: 7 MMOL/L (ref 0–18)
AST SERPL-CCNC: 20 U/L (ref 15–37)
BASOPHILS # BLD AUTO: 0.03 X10(3) UL (ref 0–0.2)
BASOPHILS NFR BLD AUTO: 0.5 %
BILIRUB SERPL-MCNC: 0.5 MG/DL (ref 0.1–2)
BUN BLD-MCNC: 27 MG/DL (ref 7–18)
CALCIUM BLD-MCNC: 9 MG/DL (ref 8.5–10.1)
CHLORIDE SERPL-SCNC: 115 MMOL/L (ref 98–112)
CHOLEST SERPL-MCNC: 123 MG/DL (ref ?–200)
CO2 SERPL-SCNC: 21 MMOL/L (ref 21–32)
CREAT BLD-MCNC: 1.27 MG/DL
CREAT UR-SCNC: 235 MG/DL
EOSINOPHIL # BLD AUTO: 0.15 X10(3) UL (ref 0–0.7)
EOSINOPHIL NFR BLD AUTO: 2.4 %
ERYTHROCYTE [DISTWIDTH] IN BLOOD BY AUTOMATED COUNT: 12.7 %
EST. AVERAGE GLUCOSE BLD GHB EST-MCNC: 134 MG/DL (ref 68–126)
FASTING PATIENT LIPID ANSWER: YES
FASTING STATUS PATIENT QL REPORTED: YES
GFR SERPLBLD BASED ON 1.73 SQ M-ARVRAT: 56 ML/MIN/1.73M2 (ref 60–?)
GLOBULIN PLAS-MCNC: 3.5 G/DL (ref 2.8–4.4)
GLUCOSE BLD-MCNC: 134 MG/DL (ref 70–99)
HBA1C MFR BLD: 6.3 % (ref ?–5.7)
HCT VFR BLD AUTO: 48.4 %
HDLC SERPL-MCNC: 53 MG/DL (ref 40–59)
HGB BLD-MCNC: 15.6 G/DL
IMM GRANULOCYTES # BLD AUTO: 0.03 X10(3) UL (ref 0–1)
IMM GRANULOCYTES NFR BLD: 0.5 %
LDLC SERPL CALC-MCNC: 52 MG/DL (ref ?–100)
LYMPHOCYTES # BLD AUTO: 2.11 X10(3) UL (ref 1–4)
LYMPHOCYTES NFR BLD AUTO: 34.3 %
MCH RBC QN AUTO: 30.5 PG (ref 26–34)
MCHC RBC AUTO-ENTMCNC: 32.2 G/DL (ref 31–37)
MCV RBC AUTO: 94.5 FL
MICROALBUMIN UR-MCNC: 2.1 MG/DL
MICROALBUMIN/CREAT 24H UR-RTO: 8.9 UG/MG (ref ?–30)
MONOCYTES # BLD AUTO: 0.48 X10(3) UL (ref 0.1–1)
MONOCYTES NFR BLD AUTO: 7.8 %
NEUTROPHILS # BLD AUTO: 3.35 X10 (3) UL (ref 1.5–7.7)
NEUTROPHILS # BLD AUTO: 3.35 X10(3) UL (ref 1.5–7.7)
NEUTROPHILS NFR BLD AUTO: 54.5 %
NONHDLC SERPL-MCNC: 70 MG/DL (ref ?–130)
OSMOLALITY SERPL CALC.SUM OF ELEC: 303 MOSM/KG (ref 275–295)
PLATELET # BLD AUTO: 201 10(3)UL (ref 150–450)
POTASSIUM SERPL-SCNC: 4.7 MMOL/L (ref 3.5–5.1)
PROT SERPL-MCNC: 7 G/DL (ref 6.4–8.2)
RBC # BLD AUTO: 5.12 X10(6)UL
SODIUM SERPL-SCNC: 143 MMOL/L (ref 136–145)
T4 FREE SERPL-MCNC: 1.1 NG/DL (ref 0.8–1.7)
TRIGL SERPL-MCNC: 92 MG/DL (ref 30–149)
TSI SER-ACNC: 4.22 MIU/ML (ref 0.36–3.74)
VLDLC SERPL CALC-MCNC: 13 MG/DL (ref 0–30)
WBC # BLD AUTO: 6.2 X10(3) UL (ref 4–11)

## 2023-06-13 PROCEDURE — 84443 ASSAY THYROID STIM HORMONE: CPT

## 2023-06-13 PROCEDURE — 83036 HEMOGLOBIN GLYCOSYLATED A1C: CPT

## 2023-06-13 PROCEDURE — 80053 COMPREHEN METABOLIC PANEL: CPT

## 2023-06-13 PROCEDURE — 85025 COMPLETE CBC W/AUTO DIFF WBC: CPT

## 2023-06-13 PROCEDURE — 36415 COLL VENOUS BLD VENIPUNCTURE: CPT

## 2023-06-13 PROCEDURE — 82043 UR ALBUMIN QUANTITATIVE: CPT

## 2023-06-13 PROCEDURE — 82570 ASSAY OF URINE CREATININE: CPT

## 2023-06-13 PROCEDURE — 84439 ASSAY OF FREE THYROXINE: CPT

## 2023-06-13 PROCEDURE — 80061 LIPID PANEL: CPT

## 2023-07-03 ENCOUNTER — OFFICE VISIT (OUTPATIENT)
Dept: INTERNAL MEDICINE CLINIC | Facility: CLINIC | Age: 83
End: 2023-07-03
Payer: COMMERCIAL

## 2023-07-03 VITALS
HEIGHT: 66 IN | DIASTOLIC BLOOD PRESSURE: 62 MMHG | SYSTOLIC BLOOD PRESSURE: 124 MMHG | WEIGHT: 184 LBS | HEART RATE: 79 BPM | BODY MASS INDEX: 29.57 KG/M2 | RESPIRATION RATE: 16 BRPM | OXYGEN SATURATION: 98 %

## 2023-07-03 DIAGNOSIS — M54.16 LUMBAR RADICULOPATHY: ICD-10-CM

## 2023-07-03 DIAGNOSIS — R35.0 BENIGN PROSTATIC HYPERPLASIA WITH URINARY FREQUENCY: ICD-10-CM

## 2023-07-03 DIAGNOSIS — N40.1 BENIGN PROSTATIC HYPERPLASIA WITH URINARY FREQUENCY: ICD-10-CM

## 2023-07-03 DIAGNOSIS — J47.9 BRONCHIECTASIS WITHOUT COMPLICATION (HCC): ICD-10-CM

## 2023-07-03 DIAGNOSIS — R91.8 PULMONARY NODULES: ICD-10-CM

## 2023-07-03 DIAGNOSIS — E11.9 CONTROLLED TYPE 2 DIABETES MELLITUS WITHOUT COMPLICATION, WITHOUT LONG-TERM CURRENT USE OF INSULIN (HCC): Primary | ICD-10-CM

## 2023-07-03 DIAGNOSIS — K22.89 ESOPHAGEAL THICKENING: ICD-10-CM

## 2023-07-03 DIAGNOSIS — J84.10 CALCIFIED GRANULOMA OF LUNG (HCC): ICD-10-CM

## 2023-07-03 DIAGNOSIS — M54.42 CHRONIC BILATERAL LOW BACK PAIN WITH BILATERAL SCIATICA: ICD-10-CM

## 2023-07-03 DIAGNOSIS — E03.9 ACQUIRED HYPOTHYROIDISM: ICD-10-CM

## 2023-07-03 DIAGNOSIS — G89.29 CHRONIC BILATERAL LOW BACK PAIN WITH BILATERAL SCIATICA: ICD-10-CM

## 2023-07-03 DIAGNOSIS — M51.36 LUMBAR DEGENERATIVE DISC DISEASE: ICD-10-CM

## 2023-07-03 DIAGNOSIS — M54.41 CHRONIC BILATERAL LOW BACK PAIN WITH BILATERAL SCIATICA: ICD-10-CM

## 2023-07-03 PROCEDURE — 3078F DIAST BP <80 MM HG: CPT | Performed by: NURSE PRACTITIONER

## 2023-07-03 PROCEDURE — 3008F BODY MASS INDEX DOCD: CPT | Performed by: NURSE PRACTITIONER

## 2023-07-03 PROCEDURE — 3074F SYST BP LT 130 MM HG: CPT | Performed by: NURSE PRACTITIONER

## 2023-07-03 PROCEDURE — 99214 OFFICE O/P EST MOD 30 MIN: CPT | Performed by: NURSE PRACTITIONER

## 2023-07-03 RX ORDER — GABAPENTIN 100 MG/1
100 CAPSULE ORAL 3 TIMES DAILY
Qty: 90 CAPSULE | Refills: 0 | Status: SHIPPED | OUTPATIENT
Start: 2023-07-03

## 2023-07-03 RX ORDER — TAMSULOSIN HYDROCHLORIDE 0.4 MG/1
0.4 CAPSULE ORAL NIGHTLY
Qty: 90 CAPSULE | Refills: 2 | Status: SHIPPED | OUTPATIENT
Start: 2023-07-03

## 2023-07-17 RX ORDER — ATORVASTATIN CALCIUM 20 MG/1
20 TABLET, FILM COATED ORAL NIGHTLY
Qty: 90 TABLET | Refills: 0 | Status: SHIPPED | OUTPATIENT
Start: 2023-07-17

## 2023-07-19 ENCOUNTER — TELEPHONE (OUTPATIENT)
Dept: INTERNAL MEDICINE CLINIC | Facility: CLINIC | Age: 83
End: 2023-07-19

## 2023-08-11 RX ORDER — ATORVASTATIN CALCIUM 20 MG/1
20 TABLET, FILM COATED ORAL NIGHTLY
Qty: 90 TABLET | Refills: 0 | Status: SHIPPED | OUTPATIENT
Start: 2023-08-11

## 2023-08-11 RX ORDER — LEVOTHYROXINE SODIUM 0.03 MG/1
TABLET ORAL
Qty: 90 TABLET | Refills: 0 | Status: SHIPPED | OUTPATIENT
Start: 2023-08-11

## 2023-08-11 NOTE — TELEPHONE ENCOUNTER
PASSED per protocol, refill sent.   Last PE 1/30/23  Future Appointments   Date Time Provider Melanie Ruba   8/14/2023  1:00 PM CARLY Redding [facility]

## 2023-08-14 ENCOUNTER — OFFICE VISIT (OUTPATIENT)
Dept: INTERNAL MEDICINE CLINIC | Facility: CLINIC | Age: 83
End: 2023-08-14
Payer: COMMERCIAL

## 2023-08-14 VITALS
RESPIRATION RATE: 16 BRPM | OXYGEN SATURATION: 99 % | BODY MASS INDEX: 30 KG/M2 | WEIGHT: 185.63 LBS | SYSTOLIC BLOOD PRESSURE: 116 MMHG | HEART RATE: 70 BPM | TEMPERATURE: 97 F | DIASTOLIC BLOOD PRESSURE: 64 MMHG

## 2023-08-14 DIAGNOSIS — E78.5 DYSLIPIDEMIA: ICD-10-CM

## 2023-08-14 DIAGNOSIS — R35.0 BENIGN PROSTATIC HYPERPLASIA WITH URINARY FREQUENCY: ICD-10-CM

## 2023-08-14 DIAGNOSIS — N40.1 BENIGN PROSTATIC HYPERPLASIA WITH URINARY FREQUENCY: ICD-10-CM

## 2023-08-14 DIAGNOSIS — E03.9 ACQUIRED HYPOTHYROIDISM: ICD-10-CM

## 2023-08-14 DIAGNOSIS — E11.9 CONTROLLED TYPE 2 DIABETES MELLITUS WITHOUT COMPLICATION, WITHOUT LONG-TERM CURRENT USE OF INSULIN (HCC): ICD-10-CM

## 2023-08-14 DIAGNOSIS — M54.16 LUMBAR RADICULOPATHY: Primary | ICD-10-CM

## 2023-08-14 PROCEDURE — 99214 OFFICE O/P EST MOD 30 MIN: CPT | Performed by: NURSE PRACTITIONER

## 2023-08-14 PROCEDURE — 3074F SYST BP LT 130 MM HG: CPT | Performed by: NURSE PRACTITIONER

## 2023-08-14 PROCEDURE — 3078F DIAST BP <80 MM HG: CPT | Performed by: NURSE PRACTITIONER

## 2023-10-11 NOTE — PHYSICAL THERAPY NOTE
PHYSICAL THERAPY EVALUATION - INPATIENT     Room Number: 9049/6159-X  Evaluation Date: 8/29/2017  Type of Evaluation: Initial  Physician Order: PT Eval and Treat    Presenting Problem: NSTEMI, s/p CABG 8/28/17  Reason for Therapy: Mobility Dysfunction Good  Dynamic Sitting: Fair +  Static Standing: Poor +  Dynamic Standing: Poor +    ADDITIONAL TESTS                                    NEUROLOGICAL FINDINGS                      ACTIVITY TOLERANCE  O2 Saturation: 93%  Room air  No shortness of breath    A conservation  Functional activity tolerated  Gait training  Posture  Strengthening  Lower therapeutic exercise: Ankle pumps  Transfer training    Patient End of Session: Up in chair;Needs met;Call light within reach;RN aware of session/findings; All patien 61  60 y.o female scheduled for Wide Local Excision of Malignant Melanoma of Right Lower Extremity with Sayre Node Mapping and Biopsy, possible skin graft   Plan  1. Stop all NSAIDS, herbal supplements and vitamins for 7 days.  2. NPO at midnight.  3. Take the following medications Buproprion with small sips of water on the morning of your procedure/surgery.  4. Use EZ sponges as directed  5. Labs, EKG as per surgeon  6. PMD J. Kb visit for optimization prior to surgery as per surgeon  7. Preprocedure education provided

## 2023-11-09 RX ORDER — LEVOTHYROXINE SODIUM 0.03 MG/1
TABLET ORAL
Qty: 90 TABLET | Refills: 0 | Status: SHIPPED | OUTPATIENT
Start: 2023-11-09

## 2023-11-09 NOTE — TELEPHONE ENCOUNTER
Last OV: Acute 08/14/23 SD    No future appointments.      Latest labs:   CMP,CBC,Lipid, TSH,A1C 06/13/23  Latest RX:   Medication Quantity Refills Start End   LEVOTHYROXINE 25 MCG Oral Tab 90 tablet 0 8/11/2023    Sig:   TAKE 1 TABLET(25 MCG) BY MOUTH BEFORE BREAKFAST     Route:   (none)       Per protocol  Thyroid Supplements Protocol [facility]1711/09/2023 06:04 AM   Protocol Details TSH test in past 12 months    TSH value between 0.350 and 5.500 IU/ml    Appointment in past 12 or next 3 months

## 2024-02-01 RX ORDER — ATORVASTATIN CALCIUM 20 MG/1
20 TABLET, FILM COATED ORAL NIGHTLY
Qty: 90 TABLET | Refills: 1 | Status: SHIPPED | OUTPATIENT
Start: 2024-02-01

## 2024-02-01 RX ORDER — LEVOTHYROXINE SODIUM 0.03 MG/1
TABLET ORAL
Qty: 90 TABLET | Refills: 1 | Status: SHIPPED | OUTPATIENT
Start: 2024-02-01

## 2024-02-01 NOTE — TELEPHONE ENCOUNTER
Last VISIT 8/14/20237337-NW-Bytvmpf visit    Last CPE 1/30/2023    Last REFILL  LEVOTHYROXINE 25 MCG Oral Tab 90 tablet 0 11/9/2023 --   Sig:   TAKE 1 TABLET(25 MCG) BY MOUTH BEFORE BREAKFAST     Route:   (none)     Order #:   [id number]       ATORVASTATIN 20 MG Oral Tab 90 tablet 0 8/11/2023 --   Sig:   TAKE 1 TABLET(20 MG) BY MOUTH EVERY NIGHT     Route:   Oral     Order #:   [id number]       Last LABS 6/13/2023-TSH,Lipid    No future appointments.      Per PROTOCOL? MULTIPLE    Please Approve or Deny.

## 2024-02-14 ENCOUNTER — TELEPHONE (OUTPATIENT)
Dept: INTERNAL MEDICINE CLINIC | Facility: CLINIC | Age: 84
End: 2024-02-14

## 2024-02-14 DIAGNOSIS — Z00.00 ROUTINE GENERAL MEDICAL EXAMINATION AT A HEALTH CARE FACILITY: Primary | ICD-10-CM

## 2024-02-14 DIAGNOSIS — E11.9 CONTROLLED TYPE 2 DIABETES MELLITUS WITHOUT COMPLICATION, WITHOUT LONG-TERM CURRENT USE OF INSULIN (HCC): ICD-10-CM

## 2024-02-14 DIAGNOSIS — E03.9 ACQUIRED HYPOTHYROIDISM: ICD-10-CM

## 2024-02-14 DIAGNOSIS — E78.5 DYSLIPIDEMIA: ICD-10-CM

## 2024-02-14 DIAGNOSIS — Z12.5 SCREENING FOR MALIGNANT NEOPLASM OF PROSTATE: ICD-10-CM

## 2024-02-14 NOTE — TELEPHONE ENCOUNTER
Future Appointments   Date Time Provider Department Center   3/6/2024  9:00 AM Neelam Lainez APRN [facility]     Orders to edward- Pt informed that labs need to be completed no sooner than 2 weeks prior to the appt. Pt aware to fast-no call back required     Negative

## 2024-02-29 ENCOUNTER — LAB ENCOUNTER (OUTPATIENT)
Dept: LAB | Age: 84
End: 2024-02-29
Attending: NURSE PRACTITIONER
Payer: COMMERCIAL

## 2024-02-29 DIAGNOSIS — Z00.00 ROUTINE GENERAL MEDICAL EXAMINATION AT A HEALTH CARE FACILITY: ICD-10-CM

## 2024-02-29 DIAGNOSIS — E11.9 CONTROLLED TYPE 2 DIABETES MELLITUS WITHOUT COMPLICATION, WITHOUT LONG-TERM CURRENT USE OF INSULIN (HCC): ICD-10-CM

## 2024-02-29 DIAGNOSIS — Z12.5 SCREENING FOR MALIGNANT NEOPLASM OF PROSTATE: ICD-10-CM

## 2024-02-29 DIAGNOSIS — E03.9 ACQUIRED HYPOTHYROIDISM: ICD-10-CM

## 2024-02-29 DIAGNOSIS — E78.5 DYSLIPIDEMIA: ICD-10-CM

## 2024-02-29 LAB
ALBUMIN SERPL-MCNC: 3.7 G/DL (ref 3.4–5)
ALBUMIN/GLOB SERPL: 1.1 {RATIO} (ref 1–2)
ALP LIVER SERPL-CCNC: 87 U/L
ALT SERPL-CCNC: 24 U/L
ANION GAP SERPL CALC-SCNC: 5 MMOL/L (ref 0–18)
AST SERPL-CCNC: 22 U/L (ref 15–37)
BASOPHILS # BLD AUTO: 0.03 X10(3) UL (ref 0–0.2)
BASOPHILS NFR BLD AUTO: 0.4 %
BILIRUB SERPL-MCNC: 0.6 MG/DL (ref 0.1–2)
BUN BLD-MCNC: 26 MG/DL (ref 9–23)
CALCIUM BLD-MCNC: 9.3 MG/DL (ref 8.5–10.1)
CHLORIDE SERPL-SCNC: 111 MMOL/L (ref 98–112)
CHOLEST SERPL-MCNC: 135 MG/DL (ref ?–200)
CO2 SERPL-SCNC: 27 MMOL/L (ref 21–32)
COMPLEXED PSA SERPL-MCNC: 3.19 NG/ML (ref ?–4)
CREAT BLD-MCNC: 1.3 MG/DL
EGFRCR SERPLBLD CKD-EPI 2021: 54 ML/MIN/1.73M2 (ref 60–?)
EOSINOPHIL # BLD AUTO: 0.16 X10(3) UL (ref 0–0.7)
EOSINOPHIL NFR BLD AUTO: 2.4 %
ERYTHROCYTE [DISTWIDTH] IN BLOOD BY AUTOMATED COUNT: 12.9 %
EST. AVERAGE GLUCOSE BLD GHB EST-MCNC: 140 MG/DL (ref 68–126)
FASTING PATIENT LIPID ANSWER: YES
FASTING STATUS PATIENT QL REPORTED: YES
GLOBULIN PLAS-MCNC: 3.5 G/DL (ref 2.8–4.4)
GLUCOSE BLD-MCNC: 123 MG/DL (ref 70–99)
HBA1C MFR BLD: 6.5 % (ref ?–5.7)
HCT VFR BLD AUTO: 48.7 %
HDLC SERPL-MCNC: 51 MG/DL (ref 40–59)
HGB BLD-MCNC: 15.4 G/DL
IMM GRANULOCYTES # BLD AUTO: 0.02 X10(3) UL (ref 0–1)
IMM GRANULOCYTES NFR BLD: 0.3 %
LDLC SERPL CALC-MCNC: 67 MG/DL (ref ?–100)
LYMPHOCYTES # BLD AUTO: 2.3 X10(3) UL (ref 1–4)
LYMPHOCYTES NFR BLD AUTO: 34.4 %
MCH RBC QN AUTO: 30.6 PG (ref 26–34)
MCHC RBC AUTO-ENTMCNC: 31.6 G/DL (ref 31–37)
MCV RBC AUTO: 96.6 FL
MONOCYTES # BLD AUTO: 0.5 X10(3) UL (ref 0.1–1)
MONOCYTES NFR BLD AUTO: 7.5 %
NEUTROPHILS # BLD AUTO: 3.67 X10 (3) UL (ref 1.5–7.7)
NEUTROPHILS # BLD AUTO: 3.67 X10(3) UL (ref 1.5–7.7)
NEUTROPHILS NFR BLD AUTO: 55 %
NONHDLC SERPL-MCNC: 84 MG/DL (ref ?–130)
OSMOLALITY SERPL CALC.SUM OF ELEC: 302 MOSM/KG (ref 275–295)
PLATELET # BLD AUTO: 201 10(3)UL (ref 150–450)
POTASSIUM SERPL-SCNC: 4 MMOL/L (ref 3.5–5.1)
PROT SERPL-MCNC: 7.2 G/DL (ref 6.4–8.2)
RBC # BLD AUTO: 5.04 X10(6)UL
SODIUM SERPL-SCNC: 143 MMOL/L (ref 136–145)
T4 FREE SERPL-MCNC: 1 NG/DL (ref 0.8–1.7)
TRIGL SERPL-MCNC: 92 MG/DL (ref 30–149)
TSI SER-ACNC: 4.64 MIU/ML (ref 0.36–3.74)
VLDLC SERPL CALC-MCNC: 14 MG/DL (ref 0–30)
WBC # BLD AUTO: 6.7 X10(3) UL (ref 4–11)

## 2024-02-29 PROCEDURE — 84443 ASSAY THYROID STIM HORMONE: CPT

## 2024-02-29 PROCEDURE — 83036 HEMOGLOBIN GLYCOSYLATED A1C: CPT

## 2024-02-29 PROCEDURE — 80053 COMPREHEN METABOLIC PANEL: CPT

## 2024-02-29 PROCEDURE — 82043 UR ALBUMIN QUANTITATIVE: CPT

## 2024-02-29 PROCEDURE — 84439 ASSAY OF FREE THYROXINE: CPT

## 2024-02-29 PROCEDURE — 85025 COMPLETE CBC W/AUTO DIFF WBC: CPT

## 2024-02-29 PROCEDURE — 80061 LIPID PANEL: CPT

## 2024-02-29 PROCEDURE — 82570 ASSAY OF URINE CREATININE: CPT

## 2024-03-01 LAB
CREAT UR-SCNC: 213 MG/DL
MICROALBUMIN UR-MCNC: 2.01 MG/DL
MICROALBUMIN/CREAT 24H UR-RTO: 9.4 UG/MG (ref ?–30)

## 2024-03-05 NOTE — PROGRESS NOTES
Uziel Mayberry is a 83 year old male who presents for a complete physical exam.     HPI:   Pt complains of .  Multiple issues he would like to address again.  Some we discussed last visit as well  his wife is here with him today.  This has helped.      Hearing loss.  Impacted cerumen  has had removed before  will refer to Dr Givens.      Cardiology  Plavix.  Statin.    Marinescu    Pulmonary  has deferred referral in the past but with persistent SOB on exertion I have explained to him evaluation is my preference.   CT chest due  will have before he sees pulmonary as he agrees.   Hx TB exposure as child.      Dyslipidemia.  Atorvastatin 20mg.      Hypothyroid  levothyroxine.25mcg.  TSH elevated 4.6 with normal Free T4  stable from 6/23 4.2 with normal free T4 as well.     BPH  flomax.      Lumbar radiculopathy.  Could be contribuitng to groin and hip pain.   He has not done PT.  His leg pain and hip/groin pain are most consistent with lumbar radiculitis.  Pain worse with incline     diabetes.  A1c 6.5   eye exam.  Statin.      Esophageal thickening CT 2022.  Has not pursued GI work up.  Stable weight.  He defers work up.      Testicular swelling with hx hydrocele.  Required surgical intervention Dr Reed. Some discomfort at time but not pain.  Brother passed of bladder cancer.      Hx DVT.  Dr Lam in the past.   DOAC for 6 weeks.  Today with Right > left LE edema.        Wt Readings from Last 6 Encounters:   03/06/24 185 lb 12.8 oz (84.3 kg)   08/14/23 185 lb 9.6 oz (84.2 kg)   07/03/23 184 lb (83.5 kg)   01/30/23 184 lb 9.6 oz (83.7 kg)   04/19/22 180 lb (81.6 kg)   04/18/22 179 lb (81.2 kg)       Cholesterol, Total (mg/dL)   Date Value   02/29/2024 135   06/13/2023 123   11/21/2022 128     CHOLESTEROL (mg/dL)   Date Value   06/03/2013 141     HDL Cholesterol (mg/dL)   Date Value   02/29/2024 51   06/13/2023 53   11/21/2022 44     HDL CHOL (mg/dL)   Date Value   06/03/2013 52     LDL Cholesterol (mg/dL)    Date Value   02/29/2024 67   06/13/2023 52   11/21/2022 62     LDL CHOLESTROL (mg/dL)   Date Value   06/03/2013 69     AST (U/L)   Date Value   02/29/2024 22   06/13/2023 20   11/21/2022 22   06/03/2013 22     ALT (U/L)   Date Value   02/29/2024 24   06/13/2023 22   11/21/2022 25   06/03/2013 35      Current Outpatient Medications   Medication Sig Dispense Refill    levothyroxine 25 MCG Oral Tab TAKE 1 TABLET(25 MCG) BY MOUTH BEFORE BREAKFAST 90 tablet 1    atorvastatin 20 MG Oral Tab TAKE 1 TABLET(20 MG) BY MOUTH EVERY NIGHT 90 tablet 1    tamsulosin 0.4 MG Oral Cap Take 1 capsule (0.4 mg total) by mouth at bedtime. 90 capsule 2    clopidogrel 75 MG Oral Tab Take 1 tablet (75 mg total) by mouth daily. 30 tablet 0    Cholecalciferol 5000 units Oral Tab Take 1 tablet (5,000 Units total) by mouth daily.      magnesium 250 MG Oral Tab Take 1 tablet (250 mg total) by mouth as needed (for cramps).        Past Medical History:   Diagnosis Date    Atherosclerosis of coronary artery     Fibroma     GERD (gastroesophageal reflux disease)     Hearing impairment     High blood pressure     High cholesterol     Other and unspecified hyperlipidemia     Pyocele 11/05/2021    Thyroid disease     Unspecified essential hypertension       Past Surgical History:   Procedure Laterality Date    ANGIOGRAM      BYPASS SURGERY      CABG      CATARACT      COLONOSCOPY      HERNIA SURGERY      OTHER SURGICAL HISTORY  11/05/2021    Scrotal exploration, hydrocelectomy Dr. Reed.    REMOVAL OF HYDROCELE,TUNICA,UNILAT Left 11/05/2021    exploration of pyocele. hydrocelectomy    TONSILLECTOMY        Family History   Problem Relation Age of Onset    Heart Disorder Father     Cancer Mother         leukemia    Heart Disorder Brother     Other (Other) Brother         buergekael    Other (Other) Brother         buergekael    Cancer Maternal Grandmother     Diabetes Paternal Grandmother            Health Maintenance  Immunizations:   Immunization History    Administered Date(s) Administered    Covid-19 Vaccine Pfizer 30 mcg/0.3 ml 03/06/2021, 03/27/2021, 12/03/2021    FLU VAC High Dose 65 YRS & Older PRSV Free (75614) 11/02/2015    Pneumococcal (Prevnar 13) 11/02/2015    Pneumovax 23 06/26/2012, 03/06/2017    Zoster Vaccine Live (Zostavax) 03/06/2017    Zoster Vaccine Recombinant Adjuvanted (Shingrix) 06/24/2019     Dental Visits:  yes   Colon cancer screening:    Health Maintenance   Topic Date Due    Colorectal Cancer Screening  Discontinued      PSA:  na   Lab Results   Component Value Date    PSA 2.98 06/03/2013       PSA Screen:     Lab Results   Component Value Date    PSAS 3.19 02/29/2024    PSAS 2.91 03/01/2017         REVIEW OF SYSTEMS:   GENERAL: feels well otherwise  SKIN: denies any unusual skin lesions  EYES:denies blurred vision or double vision  HEENT: denies nasal congestion, sinus pain or ear discomfort  LUNGS: denies shortness of breath with exertion  CARDIOVASCULAR: denies chest pain on exertion  GI: denies abdominal pain,denies heartburn  : denies nocturia or changes in stream  as above   MUSCULOSKELETAL: as above   NEURO: denies headaches      EXAM:   /62 (BP Location: Left arm, Patient Position: Sitting, Cuff Size: large)   Pulse 84   Temp 97.4 °F (36.3 °C) (Temporal)   Resp 18   Ht 5' 7.32\" (1.71 m)   Wt 185 lb 12.8 oz (84.3 kg)   SpO2 97%   BMI 28.82 kg/m²   Body mass index is 28.82 kg/m².   GENERAL: well developed, well nourished,in no apparent distress  HEENT: atraumatic, normocephalic,ears and throat are clear  impacted cerumen bilaterally.    EYES:PERRLA, EOMI, conjunctiva are clear  NECK: supple,no adenopathy,  CHEST: no chest tenderness  LUNGS: clear to auscultation  CARDIO: RRR without murmur  GI: normal BS, soft, no masses, HSM or tenderness  : two descended testes,no masses,no hernia,no penile lesion  no scrotal edema on exam today.      MUSCULOSKELETAL:positional back pain.   EXTREMITIES:right > left LE edema  NEURO:  Oriented times three,cranial nerves are intact,motor and sensory are grossly intact   Bilateral barefoot skin diabetic exam is normal, visualized feet and the appearance is normal.  Bilateral monofilament/sensation of both feet is normal.  Pulsation pedal pulse exam of both lower legs/feet is normal as well.    ASSESSMENT AND PLAN:   Uziel Mayberry is a 83 year old male who presents for a complete physical exam.     HEALTH MAINTENANCE: labs reviewed.      Encounter Diagnoses   Name Primary?    Routine general medical examination at a health care facility Yes    Atherosclerosis of aorta (HCC)  stable  per cardiology     Bronchiectasis without complication (HCC)  he agrees to see pulmoanry   hx TB exposure as child.  Still with SOB w exertion.  Dr Brandt.  CT prior.       Calcified granuloma of lung (HCC)  stable  monitor.      Controlled type 2 diabetes mellitus without complication, without long-term current use of insulin (HCC) stable       Thrombocytopenia (HCC)  stable  monitor.      Coronary artery disease involving native coronary artery of native heart without angina pectoris  stable  monitor.  Per Cardiology     Dyslipidemia  stable   statin.  Monitor.      History of myocardial infarction  stable  per Cardiology     S/P CABG (coronary artery bypass graft)  stable  per cardiology     SOB (shortness of breath) on exertion  as above      Pulmonary nodules  as above   agrees to see pulmonary      Acquired hypothyroidism  stable   will continue same dose for now.   Continue to monitor.      Vitamin D deficiency  stable  cont supplement.      Lumbar degenerative disc disease  stable with exacerbations.  He agrees to PT     Lumbar radiculopathy  as above      Esophageal thickening  defers GI w/u      Benign prostatic hyperplasia with urinary frequency  flomax     History of DVT in adulthood  with recurrent leg swelling  US today. Stat.      Normocytic anemia stable  monitor.      History of hydrocele  with  new issues   refer to urology.  Dr Carpenter.      Testicular swelling  no swelling today.  Will check US and refer to urology.       Right leg swelling  check stat US.     Bilateral impacted cerumen  refer to ENT       Bilateral hearing loss, unspecified hearing loss type  cerumen but underlyng hearing loss and not wearing hearing aids.      I have asked him to f/u in 6 weeks as there are multiple issues we need to continue and monitor closely.       Orders Placed This Encounter   Procedures    Diabetic Retinopathy Exam  OU - Both Eyes   Unable to do exam in the office   will see ophthalmology.      Meds & Refills for this Visit:  Requested Prescriptions      No prescriptions requested or ordered in this encounter       Imaging & Consults:  PULMONARY - INTERNAL  UROLOGY - INTERNAL  OP REFERRAL TO [facility]  ENT - INTERNAL  US SCROTUM W/ DOPPLER (CPT=93975/65677)  US VENOUS DOPPLER LEG RIGHT - DIAG IMG (CPT=93971)    Return in about 6 weeks (around 4/17/2024).  There are no Patient Instructions on file for this visit.

## 2024-03-06 ENCOUNTER — HOSPITAL ENCOUNTER (OUTPATIENT)
Dept: ULTRASOUND IMAGING | Facility: HOSPITAL | Age: 84
Discharge: HOME | End: 2024-03-06
Attending: NURSE PRACTITIONER
Payer: COMMERCIAL

## 2024-03-06 ENCOUNTER — OFFICE VISIT (OUTPATIENT)
Dept: INTERNAL MEDICINE CLINIC | Facility: CLINIC | Age: 84
End: 2024-03-06
Payer: COMMERCIAL

## 2024-03-06 VITALS
RESPIRATION RATE: 18 BRPM | OXYGEN SATURATION: 97 % | BODY MASS INDEX: 28.82 KG/M2 | HEART RATE: 84 BPM | WEIGHT: 185.81 LBS | TEMPERATURE: 97 F | HEIGHT: 67.32 IN | DIASTOLIC BLOOD PRESSURE: 62 MMHG | SYSTOLIC BLOOD PRESSURE: 116 MMHG

## 2024-03-06 DIAGNOSIS — J47.9 BRONCHIECTASIS WITHOUT COMPLICATION (HCC): ICD-10-CM

## 2024-03-06 DIAGNOSIS — R91.8 PULMONARY NODULES: ICD-10-CM

## 2024-03-06 DIAGNOSIS — K22.89 ESOPHAGEAL THICKENING: ICD-10-CM

## 2024-03-06 DIAGNOSIS — Z00.00 ROUTINE GENERAL MEDICAL EXAMINATION AT A HEALTH CARE FACILITY: Primary | ICD-10-CM

## 2024-03-06 DIAGNOSIS — Z87.438 HISTORY OF HYDROCELE: ICD-10-CM

## 2024-03-06 DIAGNOSIS — E55.9 VITAMIN D DEFICIENCY: ICD-10-CM

## 2024-03-06 DIAGNOSIS — D64.9 NORMOCYTIC ANEMIA: ICD-10-CM

## 2024-03-06 DIAGNOSIS — D69.6 THROMBOCYTOPENIA (HCC): ICD-10-CM

## 2024-03-06 DIAGNOSIS — H91.93 BILATERAL HEARING LOSS, UNSPECIFIED HEARING LOSS TYPE: ICD-10-CM

## 2024-03-06 DIAGNOSIS — Z86.718 HISTORY OF DVT IN ADULTHOOD: ICD-10-CM

## 2024-03-06 DIAGNOSIS — N50.89 TESTICULAR SWELLING: ICD-10-CM

## 2024-03-06 DIAGNOSIS — I25.2 HISTORY OF MYOCARDIAL INFARCTION: ICD-10-CM

## 2024-03-06 DIAGNOSIS — I70.0 ATHEROSCLEROSIS OF AORTA (HCC): ICD-10-CM

## 2024-03-06 DIAGNOSIS — E78.5 DYSLIPIDEMIA: ICD-10-CM

## 2024-03-06 DIAGNOSIS — Z95.1 S/P CABG (CORONARY ARTERY BYPASS GRAFT): ICD-10-CM

## 2024-03-06 DIAGNOSIS — I25.10 CORONARY ARTERY DISEASE INVOLVING NATIVE CORONARY ARTERY OF NATIVE HEART WITHOUT ANGINA PECTORIS: ICD-10-CM

## 2024-03-06 DIAGNOSIS — R35.0 BENIGN PROSTATIC HYPERPLASIA WITH URINARY FREQUENCY: ICD-10-CM

## 2024-03-06 DIAGNOSIS — E03.9 ACQUIRED HYPOTHYROIDISM: ICD-10-CM

## 2024-03-06 DIAGNOSIS — M51.36 LUMBAR DEGENERATIVE DISC DISEASE: ICD-10-CM

## 2024-03-06 DIAGNOSIS — E11.9 CONTROLLED TYPE 2 DIABETES MELLITUS WITHOUT COMPLICATION, WITHOUT LONG-TERM CURRENT USE OF INSULIN (HCC): ICD-10-CM

## 2024-03-06 DIAGNOSIS — M54.16 LUMBAR RADICULOPATHY: ICD-10-CM

## 2024-03-06 DIAGNOSIS — M79.89 RIGHT LEG SWELLING: ICD-10-CM

## 2024-03-06 DIAGNOSIS — J84.10 CALCIFIED GRANULOMA OF LUNG (HCC): ICD-10-CM

## 2024-03-06 DIAGNOSIS — R06.02 SOB (SHORTNESS OF BREATH) ON EXERTION: ICD-10-CM

## 2024-03-06 DIAGNOSIS — N40.1 BENIGN PROSTATIC HYPERPLASIA WITH URINARY FREQUENCY: ICD-10-CM

## 2024-03-06 DIAGNOSIS — H61.23 BILATERAL IMPACTED CERUMEN: ICD-10-CM

## 2024-03-06 PROCEDURE — 99397 PER PM REEVAL EST PAT 65+ YR: CPT | Performed by: NURSE PRACTITIONER

## 2024-03-06 PROCEDURE — 3074F SYST BP LT 130 MM HG: CPT | Performed by: NURSE PRACTITIONER

## 2024-03-06 PROCEDURE — 99214 OFFICE O/P EST MOD 30 MIN: CPT | Performed by: NURSE PRACTITIONER

## 2024-03-06 PROCEDURE — 3008F BODY MASS INDEX DOCD: CPT | Performed by: NURSE PRACTITIONER

## 2024-03-06 PROCEDURE — 1126F AMNT PAIN NOTED NONE PRSNT: CPT | Performed by: NURSE PRACTITIONER

## 2024-03-06 PROCEDURE — 93971 EXTREMITY STUDY: CPT | Performed by: NURSE PRACTITIONER

## 2024-03-06 PROCEDURE — 3078F DIAST BP <80 MM HG: CPT | Performed by: NURSE PRACTITIONER

## 2024-03-15 ENCOUNTER — OFFICE VISIT (OUTPATIENT)
Facility: LOCATION | Age: 84
End: 2024-03-15
Payer: COMMERCIAL

## 2024-03-15 DIAGNOSIS — H61.23 BILATERAL IMPACTED CERUMEN: Primary | ICD-10-CM

## 2024-03-15 DIAGNOSIS — H93.293 ABNORMAL AUDITORY PERCEPTION OF BOTH EARS: ICD-10-CM

## 2024-03-15 PROCEDURE — 99213 OFFICE O/P EST LOW 20 MIN: CPT | Performed by: OTOLARYNGOLOGY

## 2024-03-15 NOTE — PROGRESS NOTES
Uziel Mayberry is a 83 year old male.   Chief Complaint   Patient presents with    Hearing Check    Cerumen Impaction     HPI:   He complains of decreased hearing and wax impactions.  There is no ear pain.    REVIEW OF SYSTEMS:   GENERAL HEALTH: feels well otherwise  GENERAL : denies fever, chills, sweats, weight loss, weight gain  SKIN: denies any unusual skin lesions or rashes  RESPIRATORY: denies shortness of breath with exertion  NEURO: denies headaches    EXAM:   There were no vitals taken for this visit.    System Findings Details   Constitutional  Overall appearance - Normal.   Psychiatric  Orientation - Oriented to time, place, person & situation. Appropriate mood and affect.   Head/Face  Facial features -- Normal. Skull - Normal.   Eyes  Pupils equal ,round ,react to light and accomidate   Ears, Nose, Throat, Neck  Wax impactions bilaterally moved today under the microscope otherwise tympanic membrane's are clear.  There is no infection or perforation.   Neurological  Memory - Normal. Cranial nerves - Cranial nerves II through XII grossly intact.   Lymph Detail  Submental. Submandibular. Anterior cervical. Posterior cervical. Supraclavicular.       ASSESSMENT AND PLAN:   1. Bilateral impacted cerumen  Removed today    2. Abnormal auditory perception of both ears  He has over-the-counter hearing aids.  He does not want to have an audiogram performed.      The patient indicates understanding of these issues and agrees to the plan.    No follow-ups on file.    Hans Givens MD  3/15/2024  11:42 AM

## 2024-03-22 ENCOUNTER — HOSPITAL ENCOUNTER (OUTPATIENT)
Dept: CT IMAGING | Age: 84
Discharge: HOME | End: 2024-03-22
Attending: NURSE PRACTITIONER
Payer: COMMERCIAL

## 2024-03-22 DIAGNOSIS — J84.10 CALCIFIED GRANULOMA OF LUNG (HCC): ICD-10-CM

## 2024-03-22 DIAGNOSIS — J47.9 BRONCHIECTASIS WITHOUT COMPLICATION (HCC): ICD-10-CM

## 2024-03-22 DIAGNOSIS — R91.8 PULMONARY NODULES: ICD-10-CM

## 2024-03-22 PROCEDURE — 71250 CT THORAX DX C-: CPT | Performed by: NURSE PRACTITIONER

## 2024-03-27 RX ORDER — TAMSULOSIN HYDROCHLORIDE 0.4 MG/1
0.4 CAPSULE ORAL NIGHTLY
Qty: 90 CAPSULE | Refills: 0 | Status: SHIPPED | OUTPATIENT
Start: 2024-03-27

## 2024-03-27 NOTE — TELEPHONE ENCOUNTER
PASSED per protocol, refill sent.    Last PE: 3-6-2024-sd-physical       Future Appointments   Date Time Provider Department Center   4/4/2024  2:00 PM Oneida Kate MD  [facility]

## 2024-04-04 ENCOUNTER — OFFICE VISIT (OUTPATIENT)
Facility: CLINIC | Age: 84
End: 2024-04-04
Payer: MEDICARE

## 2024-04-04 VITALS
HEIGHT: 67 IN | OXYGEN SATURATION: 99 % | DIASTOLIC BLOOD PRESSURE: 68 MMHG | WEIGHT: 187 LBS | SYSTOLIC BLOOD PRESSURE: 130 MMHG | BODY MASS INDEX: 29.35 KG/M2 | HEART RATE: 85 BPM | RESPIRATION RATE: 16 BRPM

## 2024-04-04 DIAGNOSIS — R91.8 MULTIPLE PULMONARY NODULES: ICD-10-CM

## 2024-04-04 DIAGNOSIS — R06.02 SHORTNESS OF BREATH: Primary | ICD-10-CM

## 2024-04-04 DIAGNOSIS — J47.9 BRONCHIECTASIS WITHOUT COMPLICATION (HCC): ICD-10-CM

## 2024-04-04 PROCEDURE — 99204 OFFICE O/P NEW MOD 45 MIN: CPT | Performed by: INTERNAL MEDICINE

## 2024-04-04 RX ORDER — CLOPIDOGREL BISULFATE 75 MG/1
75 TABLET ORAL DAILY
Qty: 30 TABLET | Refills: 0 | Status: SHIPPED | OUTPATIENT
Start: 2024-04-04

## 2024-04-04 RX ORDER — IPRATROPIUM BROMIDE AND ALBUTEROL SULFATE 2.5; .5 MG/3ML; MG/3ML
3 SOLUTION RESPIRATORY (INHALATION) 4 TIMES DAILY
Qty: 360 ML | Refills: 5 | Status: SHIPPED | OUTPATIENT
Start: 2024-04-04

## 2024-04-04 NOTE — TELEPHONE ENCOUNTER
LOV 3/6/24 pasted from note below    \"Cardiology Plavix. Statin. Marinescu\"    Last ordered 5/4/22 #30 sent     SD - Unsure if you would refill or if should be coming from cardiology?

## 2024-04-04 NOTE — TELEPHONE ENCOUNTER
As we spoke with last refill, this should be filled by cardiology.   Will fill for 30 days again to avoid running out over the weekend.

## 2024-04-04 NOTE — PROGRESS NOTES
[facility] General Pulmonary Consult Note    Chief Complaint:  Chief Complaint   Patient presents with    New Patient     Referred by Neelam Lainez for dyspnea had a recent Ct scan. Exposed to TB as a child        History of Present Illness:  Uziel Mayberry is a 83 year old male who presents today for evaluation of shortness of breath.  This has bene ongoing issue for at least a year, but symptoms have probably started well before then.  Never smoker, but does have significant second hand smoke exposure.  Denies any cough.  No recent hospitalizations or ER visits.      Past Medical History:   Past Medical History:   Diagnosis Date    Atherosclerosis of coronary artery     Fibroma     GERD (gastroesophageal reflux disease)     Hearing impairment     High blood pressure     High cholesterol     Other and unspecified hyperlipidemia     Pyocele 11/05/2021    Thyroid disease     Unspecified essential hypertension         Past Surgical History:   Past Surgical History:   Procedure Laterality Date    ANGIOGRAM      BYPASS SURGERY      CABG      CATARACT      COLONOSCOPY      HERNIA SURGERY      OTHER SURGICAL HISTORY  11/05/2021    Scrotal exploration, hydrocelectomy Dr. Reed.    REMOVAL OF HYDROCELE,TUNICA,UNILAT Left 11/05/2021    exploration of pyocele. hydrocelectomy    TONSILLECTOMY         Family Medical History:   Family History   Problem Relation Age of Onset    Heart Disorder Father     Cancer Mother         leukemia    Heart Disorder Brother     Other (Other) Brother         buergers    Other (Other) Brother         buergers    Cancer Maternal Grandmother     Diabetes Paternal Grandmother         Social History:   Social History     Socioeconomic History    Marital status:      Spouse name: Not on file    Number of children: Not on file    Years of education: Not on file    Highest education level: Not on file   Occupational History    Not on file   Tobacco Use    Smoking status: Never    Smokeless tobacco: Never    Vaping Use    Vaping Use: Never used   Substance and Sexual Activity    Alcohol use: No    Drug use: No    Sexual activity: Not on file   Other Topics Concern    Not on file   Social History Narrative    Not on file     Social Determinants of Health     Financial Resource Strain: Not on file   Food Insecurity: Not on file   Transportation Needs: Not on file   Physical Activity: Not on file   Stress: Not on file   Social Connections: Not on file   Housing Stability: Not on file        Allergies: Aspirin, Caffeine, and Lactose     Medications:   Current Outpatient Medications   Medication Sig Dispense Refill    ipratropium-albuterol 0.5-2.5 (3) MG/3ML Inhalation Solution Take 3 mL by nebulization 4 (four) times daily. 360 mL 5    Respiratory Therapy Supplies (FULL KIT NEBULIZER SET) Does not apply Misc 1 kit As Directed. 1 each 0    tamsulosin 0.4 MG Oral Cap TAKE 1 CAPSULE(0.4 MG) BY MOUTH AT BEDTIME 90 capsule 0    levothyroxine 25 MCG Oral Tab TAKE 1 TABLET(25 MCG) BY MOUTH BEFORE BREAKFAST 90 tablet 1    atorvastatin 20 MG Oral Tab TAKE 1 TABLET(20 MG) BY MOUTH EVERY NIGHT 90 tablet 1    clopidogrel 75 MG Oral Tab Take 1 tablet (75 mg total) by mouth daily. 30 tablet 0    Cholecalciferol 5000 units Oral Tab Take 1 tablet (5,000 Units total) by mouth daily.      magnesium 250 MG Oral Tab Take 1 tablet (250 mg total) by mouth as needed (for cramps).         Review of Systems: Review of Systems    Physical Exam:  /68 (BP Location: Left arm, Patient Position: Sitting, Cuff Size: adult)   Pulse 85   Resp 16   Ht 5' 7\" (1.702 m)   Wt 187 lb (84.8 kg)   SpO2 99%   BMI 29.29 kg/m²      Constitutional: alert, cooperative. No acute distress.  HEENT: Head NC/AT. Nares normal. Septum midline. Mucosa normal. No drainage or sinus tenderness.. Mallampati 2+  Cardio: Regular rate and rhythm. Normal S1 and S2. No murmurs.   Respiratory: Thorax symmetrical with no labored breathing. clear to auscultation  bilaterally  GI: NABS. Abd soft, non-tender.  Extremities: No clubbing or cyanosis. No BLE edema.    Neurologic: A&Ox3. No gross motor deficits.  Skin: Warm, dry  Psych: Calm, cooperative. Pleasant affect.    Results:  Personally reviewed  WBC: 6.7, done on 2/29/2024.  HGB: 15.4, done on 2/29/2024.  PLT: 201, done on 2/29/2024.     Glucose: 123, done on 2/29/2024.  Cr: 1.3, done on 2/29/2024.  GFR(AA): 66, done on 3/8/2022.  GFR (non-AA): 57, done on 3/8/2022.  CA: 9.3, done on 2/29/2024.  Na: 143, done on 2/29/2024.  K: 4, done on 2/29/2024.  Cl: 111, done on 2/29/2024.  CO2: 27, done on 2/29/2024.  Last ALB was 3.7% done on 2/29/2024.     CT CHEST (CPT=71250)    Result Date: 3/22/2024  CONCLUSION:  1. Stable pulmonary nodules are noted in the lungs.  If the patient is at increased risk of lung cancer annual lung cancer screening CT is suggested.  LOCATION:  [facility]   Dictated by (CST): John Shanks MD on 3/22/2024 at 7:51 PM     Finalized by (CST): John Shanks MD on 3/22/2024 at 7:59 PM       US VENOUS DOPPLER LEG RIGHT - DIAG IMG (CPT=93971)    Result Date: 3/6/2024  CONCLUSION:  Negative DVT study.  Report called by the radiology staff.   LOCATION:  [facility]    Dictated by (CST): Lenard Edwards MD on 3/06/2024 at 4:18 PM     Finalized by (CST): Lenard Edwards MD on 3/06/2024 at 4:19 PM         Assessment/Plan:  #1. Shortness of breath, likely multifactorial  CT scan reviewed, stable pulmonary nodules, evidence of bronchiectasis, and old granulomatous disease  Trial of duonebs to see if this helps with breathing  If no improvement, check PFTs    #2. Bronchiectasis, uncomplicated  Plan as above    #3. Multiple pulmonary nodules  Largest 5 mm in size  CT's from 2022, 2023 and now in 2024 show stability  Would not need to follow regularly as has 2 years of stability      Return in about 4 weeks (around 5/2/2024).    Oneida Kate MD  4/4/2024

## 2024-04-09 ENCOUNTER — TELEPHONE (OUTPATIENT)
Dept: INTERNAL MEDICINE CLINIC | Facility: CLINIC | Age: 84
End: 2024-04-09

## 2024-05-07 ENCOUNTER — OFFICE VISIT (OUTPATIENT)
Facility: CLINIC | Age: 84
End: 2024-05-07
Payer: COMMERCIAL

## 2024-05-07 VITALS
RESPIRATION RATE: 20 BRPM | OXYGEN SATURATION: 99 % | HEART RATE: 90 BPM | DIASTOLIC BLOOD PRESSURE: 78 MMHG | SYSTOLIC BLOOD PRESSURE: 136 MMHG

## 2024-05-07 DIAGNOSIS — R06.02 SHORTNESS OF BREATH: Primary | ICD-10-CM

## 2024-05-07 PROCEDURE — 3078F DIAST BP <80 MM HG: CPT | Performed by: INTERNAL MEDICINE

## 2024-05-07 PROCEDURE — 3075F SYST BP GE 130 - 139MM HG: CPT | Performed by: INTERNAL MEDICINE

## 2024-05-07 PROCEDURE — 99213 OFFICE O/P EST LOW 20 MIN: CPT | Performed by: INTERNAL MEDICINE

## 2024-05-07 NOTE — PROGRESS NOTES
[facility] Pulmonary Follow Up Note    Chief Complaint:  Chief Complaint   Patient presents with    Follow - Up     4 week follow up appointment for SHORTNESS OF BREATH. Pt states \"no change actually\". Pt states SHORTNESS OF BREATH only with exertion.   Pt states SHORTNESS OF BREATH even 50 yards.       History of Present Illness:  Uziel Mayberry is a 83 year old male who presents today for follow up of shortness of breath.  This has bene ongoing issue for at least a year, but symptoms have probably started well before then.  Never smoker, but does have significant second hand smoke exposure.  Denies any cough.  No recent hospitalizations or ER visits. They were last seen on 4/4/24.    Interval history:  Since last visit, patient started on duonebs with no significant improvement in breathing.  Tried using a few times per day with no improvement.  Noted when going from parking lot to here was out of breath.      Past Medical History:   Past Medical History:    Atherosclerosis of coronary artery    Fibroma    GERD (gastroesophageal reflux disease)    Hearing impairment    High blood pressure    High cholesterol    Other and unspecified hyperlipidemia    Pyocele    Thyroid disease    Unspecified essential hypertension        Past Surgical History:   Past Surgical History:   Procedure Laterality Date    Angiogram      Bypass surgery      Cabg      Cataract      Colonoscopy      Hernia surgery      Other surgical history  11/05/2021    Scrotal exploration, hydrocelectomy Dr. Reed.    Removal of hydrocele,tunica,unilat Left 11/05/2021    exploration of pyocele. hydrocelectomy    Tonsillectomy         Family Medical History:   Family History   Problem Relation Age of Onset    Heart Disorder Father     Cancer Mother         leukemia    Heart Disorder Brother     Other (Other) Brother         buergers    Other (Other) Brother         buergers    Cancer Maternal Grandmother     Diabetes Paternal Grandmother         Social History:    Social History     Socioeconomic History    Marital status:      Spouse name: Not on file    Number of children: Not on file    Years of education: Not on file    Highest education level: Not on file   Occupational History    Not on file   Tobacco Use    Smoking status: Never    Smokeless tobacco: Never   Vaping Use    Vaping status: Never Used   Substance and Sexual Activity    Alcohol use: No    Drug use: No    Sexual activity: Not on file   Other Topics Concern    Not on file   Social History Narrative    Not on file     Social Determinants of Health     Financial Resource Strain: Not on file   Food Insecurity: Not on file   Transportation Needs: Not on file   Physical Activity: Not on file   Stress: Not on file   Social Connections: Not on file   Housing Stability: Not on file        Medications:   Current Outpatient Medications   Medication Sig Dispense Refill    clopidogrel 75 MG Oral Tab Take 1 tablet (75 mg total) by mouth daily. 30 tablet 0    ipratropium-albuterol 0.5-2.5 (3) MG/3ML Inhalation Solution Take 3 mL by nebulization 4 (four) times daily. 360 mL 5    Respiratory Therapy Supplies (FULL KIT NEBULIZER SET) Does not apply Misc 1 kit As Directed. 1 each 0    tamsulosin 0.4 MG Oral Cap TAKE 1 CAPSULE(0.4 MG) BY MOUTH AT BEDTIME 90 capsule 0    levothyroxine 25 MCG Oral Tab TAKE 1 TABLET(25 MCG) BY MOUTH BEFORE BREAKFAST 90 tablet 1    atorvastatin 20 MG Oral Tab TAKE 1 TABLET(20 MG) BY MOUTH EVERY NIGHT 90 tablet 1    Cholecalciferol 5000 units Oral Tab Take 1 tablet (5,000 Units total) by mouth daily.      magnesium 250 MG Oral Tab Take 1 tablet (250 mg total) by mouth as needed (for cramps).         Review of Systems: Review of Systems     Physical Exam:  /78   Pulse 90   Resp 20   SpO2 99%      Constitutional: alert, cooperative. No acute distress.  HEENT: Head NC/AT. Nares normal. Septum midline. Mucosa normal. No drainage or sinus tenderness.  Cardio: Regular rate and rhythm.  Normal S1 and S2. No murmurs.   Respiratory: Thorax symmetrical with no labored breathing. clear to auscultation bilaterally  Extremities: No clubbing or cyanosis. No BLE edema.    Neurologic: A&Ox3. No gross motor deficits.  Skin: Warm, dry  Psych: Calm, cooperative. Pleasant affect.    Results:  Personally reviewed  CT CHEST (CPT=71250)  Narrative: PROCEDURE:  CT CHEST (CPT=71250)     COMPARISON:  [facility] , CT, CT CHEST (CPT=71250), 1/16/2023, 4:32 PM.     INDICATIONS:  J47.9 Bronchiectasis without complication (HCC) R91.8 Pulmonary nodules J84.10 Calcified granuloma of lung (HCC)     TECHNIQUE:  Unenhanced multislice CT scanning is performed through the chest.  Dose reduction techniques were used. Dose information is transmitted to the ACR (American College of Radiology) NRDR (National Radiology Data Registry) which includes the Dose   Index Registry.     PATIENT STATED HISTORY: (As transcribed by Technologist)  Patient presents with shortness of breath.    Hx Pulmonary nodules          FINDINGS:    LUNGS:  Stable 3 millimeter peripheral right middle lobe pulmonary nodule is noted (image 78).  Stable subpleural ground-glass nodule in the right upper lobe is noted measuring 5 millimeters (image 31).  Mild ground-glass opacity abutting the right   horizontal fissure is stable (image 68).  Scattered granulomas are noted.  VASCULATURE:  Pulmonary vessels are unremarkable within the limits of a noncontrast CT.    AL:  No mass or adenopathy.    MEDIASTINUM:  No mass or adenopathy.    CARDIAC:  Mild coronary artery atherosclerosis is noted.  PLEURA:  No mass or effusion.    THORACIC AORTA:  Unremarkable as seen on non-contrast imaging.   CHEST WALL:  No mass or axillary adenopathy.  Status post median sternotomy.  LIMITED ABDOMEN:  Limited images of the upper abdomen demonstrates cholelithiasis without evidence of cholecystitis..    BONES:  No bony lesion or fracture.                     Impression: CONCLUSION:    1.  Stable pulmonary nodules are noted in the lungs.  If the patient is at increased risk of lung cancer annual lung cancer screening CT is suggested.     LOCATION:  [facility]        Dictated by ([facility]): John Shanks MD on 3/22/2024 at 7:51 PM       Finalized by (CST): John Shanks MD on 3/22/2024 at 7:59 PM         PFTs:       No data to display                   No data to display                    WBC: 6.7, done on 2/29/2024.  HGB: 15.4, done on 2/29/2024.  PLT: 201, done on 2/29/2024.     Glucose: 123, done on 2/29/2024.  Cr: 1.3, done on 2/29/2024.  GFR(AA): 66, done on 3/8/2022.  GFR (non-AA): 57, done on 3/8/2022.  CA: 9.3, done on 2/29/2024.  Na: 143, done on 2/29/2024.  K: 4, done on 2/29/2024.  Cl: 111, done on 2/29/2024.  CO2: 27, done on 2/29/2024.  Last ALB was 3.7% done on 2/29/2024.     CT CHEST (CPT=71250)    Result Date: 3/22/2024  CONCLUSION:  1. Stable pulmonary nodules are noted in the lungs.  If the patient is at increased risk of lung cancer annual lung cancer screening CT is suggested.  LOCATION:  [facility]   Dictated by ([facility]): John Shanks MD on 3/22/2024 at 7:51 PM     Finalized by (CST): John Shanks MD on 3/22/2024 at 7:59 PM       US VENOUS DOPPLER LEG RIGHT - DIAG IMG (CPT=93971)    Result Date: 3/6/2024  CONCLUSION:  Negative DVT study.  Report called by the radiology staff.   LOCATION:  [facility]    Dictated by ([facility]): Lenard Edwards MD on 3/06/2024 at 4:18 PM     Finalized by (CST): Lenard Edwards MD on 3/06/2024 at 4:19 PM         Assessment/Plan:  #1. Shortness of breath  Will obtain PFTs  Will recheck echo from 2022  Will hold off on any further treatments until above completed    Return in about 6 weeks (around 6/18/2024).    I spent 20 minutes obtaining and reviewing records, preparing for the visit including reviewing chart and prior testing, face to face time examining the patient and obtaining history, counseling, arranging and reviewing office-based testing, independently reviewing relevant  studies and documentation exclusive of other billable procedures.      Oneida Kate MD  5/7/2024

## 2024-07-12 RX ORDER — TAMSULOSIN HYDROCHLORIDE 0.4 MG/1
0.4 CAPSULE ORAL NIGHTLY
Qty: 90 CAPSULE | Refills: 3 | Status: SHIPPED | OUTPATIENT
Start: 2024-07-12

## 2024-07-12 NOTE — TELEPHONE ENCOUNTER
Refill Per Protocol     Requested Prescriptions   Pending Prescriptions Disp Refills    TAMSULOSIN 0.4 MG Oral Cap [Pharmacy Med Name: TAMSULOSIN 0.4MG CAPSULES] 90 capsule 0     Sig: TAKE 1 CAPSULE(0.4 MG) BY MOUTH AT BEDTIME       Genitourinary Medications Passed - 7/10/2024  8:28 AM        Passed - Patient does not have pulmonary hypertension on problem list        Passed - In person appointment or virtual visit in the past 12 mos or appointment in next 3 mos     Recent Outpatient Visits              2 months ago Shortness of breath    [facility]    [facility]    3 months ago [facility]    [facility]    3 months ago Bilateral impacted cerumen    [facility] [address] Bern[facility] Kaplan Rodney T, MD    Office Visit    4 months ago Routine general medical examination at a health care facility    [facility], [address] [place], [place] [address]Neelam Norman APRN    Office Visit    11 months ago Lumbar radiculopathy    [address]    Office Visit                               Recent Outpatient Visits              2 months ago [facility]ness of breath    [facility]    [facility]    3 months ago [facility]ness of breath    [facility]    Office Visit    3 months ago Bilateral impacted cerumen    [facility] [address] Bernice Kaplan Rodney T, MD    Office Visit    4 months ago Routine general medical examination at a health care facility    [address] Neelam Eric APRN    Office Visit    11 months ago Lumbar radiculopathy     [facility], [address] [place], [place] [address]Neelam Escalera, CARLY    Office Visit

## 2024-08-01 RX ORDER — ATORVASTATIN CALCIUM 20 MG/1
20 TABLET, FILM COATED ORAL NIGHTLY
Qty: 90 TABLET | Refills: 3 | Status: SHIPPED | OUTPATIENT
Start: 2024-08-01

## 2024-08-01 RX ORDER — LEVOTHYROXINE SODIUM 0.03 MG/1
25 TABLET ORAL
Qty: 90 TABLET | Refills: 1 | Status: SHIPPED | OUTPATIENT
Start: 2024-08-01

## 2024-08-01 NOTE — TELEPHONE ENCOUNTER
Refill passed per [facility] protocol.  Requested Prescriptions   Pending Prescriptions Disp Refills    ATORVASTATIN 20 MG Oral Tab [Pharmacy Med Name: ATORVASTATIN 20MG TABLETS] 90 tablet 1     Sig: TAKE 1 TABLET(20 MG) BY MOUTH EVERY NIGHT       Cholesterol Medication Protocol Passed - 7/29/2024  6:03 AM        Passed - ALT < 80     Lab Results   Component Value Date    ALT 24 02/29/2024             Passed - ALT resulted within past year        Passed - Lipid panel within past 12 months     Lab Results   Component Value Date    CHOLEST 135 02/29/2024    TRIG 92 02/29/2024    HDL 51 02/29/2024    LDL 67 02/29/2024    VLDL 14 02/29/2024    TCHDLRATIO 2.72 06/25/2018    NONHDLC 84 02/29/2024             Passed - In person appointment or virtual visit in the past 12 mos or appointment in next 3 mos     Recent Out[facility] Visits              2 months ago [facility]    [facility]    3 months ago [facility] of breath    [facility]    Office Visit    4 months ago Bilateral impacted cerumen    [facility], [address], Hans Samson MD    Office Visit    4 months ago Routine general medical examination at a health care facility    [facility], [address] [place] [address], Neelam Eric APRN    Office Visit    11 months ago Lumbar radiculopathy    [facility], [address] [place] [address] [place]    Office Visit                        LEVOTHYROXINE 25 MCG Oral Tab [Pharmacy Med Name: LEVOTHYROXINE 0.025MG (25MCG) TAB] 90 tablet 1     Sig: TAKE 1 TABLET(25 MCG) BY MOUTH BEFORE BREAKFAST       Thyroid Medication Protocol Failed - 7/29/2024  6:03 AM        Failed - Last TSH value is normal     Lab Results   Component Value Date    TSH 4.640 (H) 02/29/2024                  Passed - TSH in past 12 months        Passed - In person appointment or virtual visit in the past 12 mos or appointment in next 3 mos     Recent Outpatient Visits              2 months ago Shortness of breath    [facility] [place] Bernice Allan Jasshan Mahesh, MD    Office Visit    3 months ago Shortness of breath    [facility] [place] Jasshan Mahesh, MD    Office Visit    4 months ago Bilateral impacted cerumen    [facility] Onofre Rodney T, MD    Office Visit    4 months ago Routine general medical examination at a health care facility    [facility] [address] [place] [address] Neelam Eric APRN    Office Visit    11 months ago Lumbar radiculopathy    [facility] [address] [place] [address], Neelam Eric APRN    Office Visit                         Recent Outpatient Visits              2 months ago Shortness of breath    [facility] [place] Jasshan Mahesh, MD    Office Visit    3 months ago Shortness of breath    [facility] Bernice Allan Jasshan Mahesh, MD    Office Visit    4 months ago Bilateral impacted cerumen    [facility] Onofre Rodney T, MD    Office Visit    4 months ago Routine general medical examination at a health care facility    [facility] [address] [place] [address], Neelam Eric APRN    Office Visit    11 months ago Lumbar radiculopathy    [facility], [address] [place] [address] Neelam Eric APRN    Office Visit

## 2024-08-01 NOTE — TELEPHONE ENCOUNTER
Please review; protocol failed/ has no protocol    Requested Prescriptions   Pending Prescriptions Disp Refills    levothyroxine 25 MCG Oral Tab [Pharmacy Med Name: LEVOTHYROXINE 0.025MG (25MCG) TAB] 90 tablet 1     Sig: Take 1 tablet (25 mcg total) by mouth before breakfast.       Thyroid Medication Protocol Failed - 8/1/2024  9:32 AM        Failed - Last TSH value is normal     Lab Results   Component Value Date    TSH 4.640 (H) 02/29/2024                 Passed - TSH in past 12 months        Passed - In person appointment or virtual visit in the past 12 mos or appointment in next 3 mos     Recent Outpatient Visits              2 months ago Shortness of breath    [facility]    [facility]    3 months ago Shortness of breath    [facility]    Office Visit    4 months ago Bilateral impacted cerumen    [facility] [address], Hans Samson MD    Office Visit    4 months ago Routine general medical examination at a health care facility    [facility] [address] [place] [address], Neelam Eric APRN    Office Visit    11 months ago Lumbar radiculopathy    [facility], [address] [place], [place] [address]Neelam Escalera APRN    Office Visit                       Signed Prescriptions Disp Refills    atorvastatin 20 MG Oral Tab 90 tablet 3     Sig: Take 1 tablet (20 mg total) by mouth nightly.       Cholesterol Medication Protocol Passed - 7/29/2024  6:03 AM        Passed - ALT < 80     Lab Results   Component Value Date    ALT 24 02/29/2024             Passed - ALT resulted within past year        Passed - Lipid panel within past 12 months     Lab Results   Component Value Date    CHOLEST 135 02/29/2024    TRIG 92 02/29/2024    HDL 51 02/29/2024    LDL 67 02/29/2024    VLDL 14 02/29/2024    TCHDLRATIO 2.72  06/25/2018    NONHDL 84 02/29/2024             Passed - In person appointment or virtual visit in the past 12 mos or appointment in next 3 mos     Recent Outpatient Visits              2 months ago [facility] of breath    [facility] [place]    Office Visit    3 months ago Shortness of breath    [facility] [place] Bernice Allan Jasshan Mahesh, MD    Office Visit    4 months ago Bilateral impacted cerumen    [facility], Bernice Onofre Rodney T, MD    Office Visit    4 months ago Routine general medical examination at a health care facility    [facility], [address] [place] [address], Neelam Eric APRN    Office Visit    11 months ago Lumbar radiculopathy    [facility], [address] [place] [address] Neelam Eric APRN    Office Visit                         Recent Outpatient Visits              2 months ago Shortness of breath    [facility] [place] [facility] Jasshan Ma[facility], MD    Office Visit    3 months ago Shortness of breath    [facility] [place] Bernice Allan Jasshan Mahesh, MD    Office Visit    4 months ago Bilateral impacted cerumen    [facility], Bernice Onofre Rodney T, MD    Office Visit    4 months ago Routine general medical examination at a health care facility    [facility], [address] [place] [address], Neelam Eric APRN    Office Visit    11 months ago Lumbar radiculopathy    [facility], [address] [place] [address] Neelam Eric APRN    Office Visit

## 2025-01-30 RX ORDER — LEVOTHYROXINE SODIUM 25 UG/1
25 TABLET ORAL
Qty: 90 TABLET | Refills: 0 | Status: SHIPPED | OUTPATIENT
Start: 2025-01-30

## 2025-01-30 NOTE — TELEPHONE ENCOUNTER
Please review; protocol failed.    Requested Prescriptions   Pending Prescriptions Disp Refills    LEVOTHYROXINE 25 MCG Oral Tab [Pharmacy Med Name: LEVOTHYROXINE 0.025MG (25MCG) TAB] 90 tablet 1     Sig: TAKE 1 TABLET(25 MCG) BY MOUTH BEFORE BREAKFAST       Thyroid Medication Protocol Failed - 1/29/2025 10:11 PM        Failed - Last TSH value is normal     Lab Results   Component Value Date    TSH 4.640 (H) 02/29/2024                 Passed - TSH in past 12 months        Passed - In person appointment or virtual visit in the past 12 mos or appointment in next 3 mos     Recent Outpatient Visits              8 months ago Shortness of breath    [facility]    Office Visit    10 months ago Shortness of breath    [facility]    Office Visit    10 months ago Bilateral impacted cerumen    [facility] [address] [place], Hans Samson MD    Office Visit    10 months ago Routine general medical examination at a health care facility    [facility], [address] [place] [address], Neelam Eric APRN    Office Visit    1 year ago Lumbar radiculopathy    [facility], [address] [place] [address], Neelam Eric APRN    Office Visit                      Passed - Medication is active on med list

## 2025-04-06 NOTE — PROGRESS NOTES
Outpatient Oncology follow-up     Patient has been up to bathroom had sba assist from staff. Scrotum has been elevated on a towel area still swollen. Patient states area is tender but has declined pain meds this shift. Appetite has been good no c/o nausea.  Patient and his wife educated on

## 2025-04-11 NOTE — CARDIAC REHAB
Cardiac rehab post CABG education completed with patient. Patient to watch dc video with his wife when she arrives later today. CR appointment made. Procedure:  LOOP ELECTROSURGICAL EXCISION REOCEDURE, EXAM UNDER ANESTHESIA (Cervix)    Relevant Problems   CARDIO   (+) Benign hypertension   (+) Hyperlipidemia   (+) Nonrheumatic mitral valve regurgitation      ENDO   (+) Hypothyroidism   (+) Type 2 diabetes mellitus (HCC)      GI/HEPATIC   (+) Cirrhosis (HCC)   (+) Duodenal ulcer   (+) Gastroesophageal reflux disease      /RENAL   (+) Nephrolithiasis   (+) Stage 2 chronic kidney disease      HEMATOLOGY   (+) Iron deficiency anemia   (+) Thrombocytopenia (HCC)      MUSCULOSKELETAL   (+) Chronic bilateral low back pain   (+) Chronic low back pain      NEURO/PSYCH   (+) Anxiety   (+) Chronic bilateral low back pain   (+) Chronic low back pain   (+) Depression      PULMONARY   (+) Acute respiratory failure with hypoxia (HCC)   (+) Centrilobular emphysema (HCC)   (+) Dyspnea   (+) RUTHANN (obstructive sleep apnea)        Physical Exam    Airway    Mallampati score: II         Dental   No notable dental hx     Cardiovascular      Pulmonary      Other Findings  post-pubertal.      Anesthesia Plan  ASA Score- 3     Anesthesia Type- general with ASA Monitors.         Additional Monitors:     Airway Plan: LMA.    Comment: I, Dr. Sauceda, the attending physician, have personally seen and evaluated the patient prior to anesthetic care.  I have reviewed the pre-anesthetic record, and other medical records if appropriate to the anesthetic care.  If a CRNA is involved in the case, I have reviewed the CRNA assessment, if present, and agree.  The patient is in a suitable condition to proceed with my formulated anesthetic plan.  .       Plan Factors-    Chart reviewed.                      Induction- intravenous.    Postoperative Plan-         Informed Consent- Anesthetic plan and risks discussed with patient.  I personally reviewed this patient with the CRNA. Discussed and agreed on the Anesthesia Plan with the CRNA..      NPO Status:  Vitals Value Taken Time   Date of last liquid  04/02/25 04/11/25 0647   Time of last liquid 0445 04/11/25 0647   Date of last solid 04/10/25 04/11/25 0647   Time of last solid 2300 04/11/25 0647

## 2025-04-15 ENCOUNTER — TELEPHONE (OUTPATIENT)
Dept: INTERNAL MEDICINE CLINIC | Facility: CLINIC | Age: 85
End: 2025-04-15

## 2025-04-15 DIAGNOSIS — Z13.0 SCREENING FOR BLOOD DISEASE: ICD-10-CM

## 2025-04-15 DIAGNOSIS — E11.9 CONTROLLED TYPE 2 DIABETES MELLITUS WITHOUT COMPLICATION, WITHOUT LONG-TERM CURRENT USE OF INSULIN (HCC): ICD-10-CM

## 2025-04-15 DIAGNOSIS — Z13.228 SCREENING FOR METABOLIC DISORDER: ICD-10-CM

## 2025-04-15 DIAGNOSIS — E55.9 VITAMIN D DEFICIENCY: ICD-10-CM

## 2025-04-15 DIAGNOSIS — Z13.29 SCREENING FOR THYROID DISORDER: ICD-10-CM

## 2025-04-15 DIAGNOSIS — Z00.00 ROUTINE GENERAL MEDICAL EXAMINATION AT A HEALTH CARE FACILITY: Primary | ICD-10-CM

## 2025-04-15 DIAGNOSIS — Z13.220 SCREENING FOR LIPID DISORDERS: ICD-10-CM

## 2025-04-15 NOTE — TELEPHONE ENCOUNTER
Future Appointments   Date Time Provider Department Center   4/21/2025 10:00 AM Neelam Lainez APRN [facility]     Orders to  [place]    Pt informed that labs need to be completed no sooner than 2 weeks prior to the appt. Pt aware to fast-no call back required

## 2025-04-17 ENCOUNTER — LAB ENCOUNTER (OUTPATIENT)
Dept: LAB | Age: 85
End: 2025-04-17
Attending: NURSE PRACTITIONER
Payer: COMMERCIAL

## 2025-04-17 DIAGNOSIS — E11.9 CONTROLLED TYPE 2 DIABETES MELLITUS WITHOUT COMPLICATION, WITHOUT LONG-TERM CURRENT USE OF INSULIN (HCC): ICD-10-CM

## 2025-04-17 DIAGNOSIS — Z13.228 SCREENING FOR METABOLIC DISORDER: ICD-10-CM

## 2025-04-17 DIAGNOSIS — E55.9 VITAMIN D DEFICIENCY: ICD-10-CM

## 2025-04-17 DIAGNOSIS — Z13.0 SCREENING FOR BLOOD DISEASE: ICD-10-CM

## 2025-04-17 DIAGNOSIS — Z13.29 SCREENING FOR THYROID DISORDER: ICD-10-CM

## 2025-04-17 DIAGNOSIS — Z00.00 ROUTINE GENERAL MEDICAL EXAMINATION AT A HEALTH CARE FACILITY: ICD-10-CM

## 2025-04-17 DIAGNOSIS — Z13.220 SCREENING FOR LIPID DISORDERS: ICD-10-CM

## 2025-04-17 LAB
ALBUMIN SERPL-MCNC: 4.3 G/DL (ref 3.2–4.8)
ALBUMIN/GLOB SERPL: 1.9 {RATIO} (ref 1–2)
ALP LIVER SERPL-CCNC: 74 U/L (ref 45–117)
ALT SERPL-CCNC: 16 U/L (ref 10–49)
ANION GAP SERPL CALC-SCNC: 9 MMOL/L (ref 0–18)
AST SERPL-CCNC: 19 U/L (ref ?–34)
BASOPHILS # BLD AUTO: 0.04 X10(3) UL (ref 0–0.2)
BASOPHILS NFR BLD AUTO: 0.6 %
BILIRUB SERPL-MCNC: 0.9 MG/DL (ref 0.2–1.1)
BUN BLD-MCNC: 26 MG/DL (ref 9–23)
CALCIUM BLD-MCNC: 9.6 MG/DL (ref 8.7–10.6)
CHLORIDE SERPL-SCNC: 108 MMOL/L (ref 98–112)
CHOLEST SERPL-MCNC: 134 MG/DL (ref ?–200)
CO2 SERPL-SCNC: 26 MMOL/L (ref 21–32)
CREAT BLD-MCNC: 1.23 MG/DL (ref 0.7–1.3)
CREAT UR-SCNC: 157.5 MG/DL
EGFRCR SERPLBLD CKD-EPI 2021: 58 ML/MIN/1.73M2 (ref 60–?)
EOSINOPHIL # BLD AUTO: 0.14 X10(3) UL (ref 0–0.7)
EOSINOPHIL NFR BLD AUTO: 2.2 %
ERYTHROCYTE [DISTWIDTH] IN BLOOD BY AUTOMATED COUNT: 13 %
EST. AVERAGE GLUCOSE BLD GHB EST-MCNC: 131 MG/DL (ref 68–126)
GLOBULIN PLAS-MCNC: 2.3 G/DL (ref 2–3.5)
GLUCOSE BLD-MCNC: 123 MG/DL (ref 70–99)
HBA1C MFR BLD: 6.2 % (ref ?–5.7)
HCT VFR BLD AUTO: 48.6 % (ref 39–53)
HDLC SERPL-MCNC: 52 MG/DL (ref 40–59)
HGB BLD-MCNC: 15.8 G/DL (ref 13–17.5)
IMM GRANULOCYTES # BLD AUTO: 0.02 X10(3) UL (ref 0–1)
IMM GRANULOCYTES NFR BLD: 0.3 %
LDLC SERPL CALC-MCNC: 63 MG/DL (ref ?–100)
LYMPHOCYTES # BLD AUTO: 2.45 X10(3) UL (ref 1–4)
LYMPHOCYTES NFR BLD AUTO: 38.2 %
MCH RBC QN AUTO: 31.4 PG (ref 26–34)
MCHC RBC AUTO-ENTMCNC: 32.5 G/DL (ref 31–37)
MCV RBC AUTO: 96.6 FL (ref 80–100)
MICROALBUMIN UR-MCNC: 0.9 MG/DL
MICROALBUMIN/CREAT 24H UR-RTO: 5.7 UG/MG (ref ?–30)
MONOCYTES # BLD AUTO: 0.49 X10(3) UL (ref 0.1–1)
MONOCYTES NFR BLD AUTO: 7.6 %
NEUTROPHILS # BLD AUTO: 3.28 X10 (3) UL (ref 1.5–7.7)
NEUTROPHILS # BLD AUTO: 3.28 X10(3) UL (ref 1.5–7.7)
NEUTROPHILS NFR BLD AUTO: 51.1 %
NONHDLC SERPL-MCNC: 82 MG/DL (ref ?–130)
OSMOLALITY SERPL CALC.SUM OF ELEC: 302 MOSM/KG (ref 275–295)
PLATELET # BLD AUTO: 178 10(3)UL (ref 150–450)
POTASSIUM SERPL-SCNC: 4.3 MMOL/L (ref 3.5–5.1)
PROT SERPL-MCNC: 6.6 G/DL (ref 5.7–8.2)
RBC # BLD AUTO: 5.03 X10(6)UL (ref 3.8–5.8)
SODIUM SERPL-SCNC: 143 MMOL/L (ref 136–145)
TRIGL SERPL-MCNC: 102 MG/DL (ref 30–149)
TSI SER-ACNC: 4.39 UIU/ML (ref 0.55–4.78)
VIT D+METAB SERPL-MCNC: 44.7 NG/ML (ref 30–100)
VLDLC SERPL CALC-MCNC: 15 MG/DL (ref 0–30)
WBC # BLD AUTO: 6.4 X10(3) UL (ref 4–11)

## 2025-04-17 PROCEDURE — 83036 HEMOGLOBIN GLYCOSYLATED A1C: CPT

## 2025-04-17 PROCEDURE — 85025 COMPLETE CBC W/AUTO DIFF WBC: CPT

## 2025-04-17 PROCEDURE — 82570 ASSAY OF URINE CREATININE: CPT

## 2025-04-17 PROCEDURE — 84443 ASSAY THYROID STIM HORMONE: CPT

## 2025-04-17 PROCEDURE — 80053 COMPREHEN METABOLIC PANEL: CPT

## 2025-04-17 PROCEDURE — 82306 VITAMIN D 25 HYDROXY: CPT

## 2025-04-17 PROCEDURE — 36415 COLL VENOUS BLD VENIPUNCTURE: CPT

## 2025-04-17 PROCEDURE — 82043 UR ALBUMIN QUANTITATIVE: CPT

## 2025-04-17 PROCEDURE — 80061 LIPID PANEL: CPT

## 2025-04-21 ENCOUNTER — OFFICE VISIT (OUTPATIENT)
Dept: INTERNAL MEDICINE CLINIC | Facility: CLINIC | Age: 85
End: 2025-04-21
Payer: MEDICARE

## 2025-04-21 VITALS
WEIGHT: 184 LBS | OXYGEN SATURATION: 99 % | HEART RATE: 82 BPM | HEIGHT: 67 IN | DIASTOLIC BLOOD PRESSURE: 72 MMHG | SYSTOLIC BLOOD PRESSURE: 126 MMHG | RESPIRATION RATE: 16 BRPM | TEMPERATURE: 98 F | BODY MASS INDEX: 28.88 KG/M2

## 2025-04-21 DIAGNOSIS — I70.0 ATHEROSCLEROSIS OF AORTA: ICD-10-CM

## 2025-04-21 DIAGNOSIS — N18.31 STAGE 3A CHRONIC KIDNEY DISEASE (HCC): ICD-10-CM

## 2025-04-21 DIAGNOSIS — R35.0 BENIGN PROSTATIC HYPERPLASIA WITH URINARY FREQUENCY: ICD-10-CM

## 2025-04-21 DIAGNOSIS — H61.23 BILATERAL IMPACTED CERUMEN: ICD-10-CM

## 2025-04-21 DIAGNOSIS — E03.9 ACQUIRED HYPOTHYROIDISM: ICD-10-CM

## 2025-04-21 DIAGNOSIS — D64.9 NORMOCYTIC ANEMIA: ICD-10-CM

## 2025-04-21 DIAGNOSIS — H91.93 BILATERAL HEARING LOSS, UNSPECIFIED HEARING LOSS TYPE: ICD-10-CM

## 2025-04-21 DIAGNOSIS — I25.10 CORONARY ARTERY DISEASE INVOLVING NATIVE CORONARY ARTERY OF NATIVE HEART WITHOUT ANGINA PECTORIS: ICD-10-CM

## 2025-04-21 DIAGNOSIS — J47.9 BRONCHIECTASIS WITHOUT COMPLICATION (HCC): ICD-10-CM

## 2025-04-21 DIAGNOSIS — E55.9 VITAMIN D DEFICIENCY: ICD-10-CM

## 2025-04-21 DIAGNOSIS — M51.360 DEGENERATION OF INTERVERTEBRAL DISC OF LUMBAR REGION WITH DISCOGENIC BACK PAIN: ICD-10-CM

## 2025-04-21 DIAGNOSIS — Z86.718 HISTORY OF DVT IN ADULTHOOD: ICD-10-CM

## 2025-04-21 DIAGNOSIS — K22.89 ESOPHAGEAL THICKENING: ICD-10-CM

## 2025-04-21 DIAGNOSIS — E11.9 CONTROLLED TYPE 2 DIABETES MELLITUS WITHOUT COMPLICATION, WITHOUT LONG-TERM CURRENT USE OF INSULIN (HCC): ICD-10-CM

## 2025-04-21 DIAGNOSIS — R06.02 SOB (SHORTNESS OF BREATH) ON EXERTION: ICD-10-CM

## 2025-04-21 DIAGNOSIS — D69.6 THROMBOCYTOPENIA: ICD-10-CM

## 2025-04-21 DIAGNOSIS — D17.24 LIPOMA OF LEFT LOWER EXTREMITY: ICD-10-CM

## 2025-04-21 DIAGNOSIS — J98.4 CALCIFIED GRANULOMA OF LUNG: ICD-10-CM

## 2025-04-21 DIAGNOSIS — R91.8 PULMONARY NODULES: ICD-10-CM

## 2025-04-21 DIAGNOSIS — M54.16 LUMBAR RADICULOPATHY: ICD-10-CM

## 2025-04-21 DIAGNOSIS — I25.2 HISTORY OF MYOCARDIAL INFARCTION: ICD-10-CM

## 2025-04-21 DIAGNOSIS — N40.1 BENIGN PROSTATIC HYPERPLASIA WITH URINARY FREQUENCY: ICD-10-CM

## 2025-04-21 DIAGNOSIS — Z95.1 S/P CABG (CORONARY ARTERY BYPASS GRAFT): ICD-10-CM

## 2025-04-21 DIAGNOSIS — Z00.00 ROUTINE GENERAL MEDICAL EXAMINATION AT A HEALTH CARE FACILITY: Primary | ICD-10-CM

## 2025-04-21 DIAGNOSIS — E78.5 DYSLIPIDEMIA: ICD-10-CM

## 2025-04-21 PROBLEM — N43.3 HYDROCELE IN ADULT: Status: RESOLVED | Noted: 2021-02-06 | Resolved: 2025-04-21

## 2025-04-21 RX ORDER — LEVOTHYROXINE SODIUM 25 UG/1
25 TABLET ORAL
Qty: 90 TABLET | Refills: 3 | Status: SHIPPED | OUTPATIENT
Start: 2025-04-21

## 2025-04-21 NOTE — PROGRESS NOTES
Subjective:   Uziel Mayberry is a 84 year old male who presents for a Subsequent Annual Wellness visit (Pt already had Initial Annual Wellness) and scheduled follow up of multiple significant but stable problems.   History of Present Illness  Uziel Mayberry is an 84-year-old male presenting for his annual wellness visit.    He experiences persistent shortness of breath with exertion, unable to walk more than fifty meters without symptoms. He has not completed the pulmonary function tests ordered in May 2024. He was given a nebulizer by his pulmonologist, which he tried for a period of time. He agrees to complete PFTs and see pulm in f/u    He has a history of coronary artery disease s/p coronary artery bypass graft surgery. He follows up with Dr. Anne at [facility]. He is currently on Plavix and questions if it could be contributing to his shortness of breath.    He has a history of lumbar radiculopathy but reports recent flares. He experiences pain in his lower back and groin, which he describes as independent issues. He has not seen a specialist for his back in a long time and is considering physical therapy. He notes that the back pain worsens when walking or standing but is absent when sitting.    He has diabetes that is diet-controlled with an A1c of 6.2. He has not been on medication for diabetes for a long time.  A1c 6.5 in 2024 no meds.     He is on atorvastatin 20 mg for dyslipidemia, with an LDL of 63, and levothyroxine 25 mcg for hypothyroidism, with a stable TSH. His vitamin D levels are within an acceptable range.    He has a history of thrombocytopenia, but his recent labs show normal platelet levels.    He continues to take tamsulosin for benign prostatic hyperplasia (BPH).    History/Other:   Fall Risk Assessment:   He has been screened for Falls and is High Risk. Fall Prevention information provided to patient in After Visit Summary.    Do you feel unsteady  when standing or walking?: (Patient-Rptd) No  Do you worry about falling?: (Patient-Rptd) Yes  Have you fallen in the past year?: (Patient-Rptd) No     Cognitive Assessment:   He had a completely normal cognitive assessment - see flowsheet entries       Functional Ability/Status:   Uziel Mayberry has some abnormal functions as listed below:  He has Dressing and/or Bathing issues based on screening of functional status.  Difficulty dressing or bathing?: (Patient-Rptd) Yes  Bathing or Showering: (Patient-Rptd) Able without help  Dressing: (Patient-Rptd) Able without help  He has difficulties Shopping for Groceries based on screening of functional status. He has Hearing problems based on screening of functional status.He has Walking problems based on screening of functional status. He has problems with Memory based on screening of functional status.       Depression Screening (PHQ):  PHQ-2 SCORE: 0  , done 4/21/2025             Advanced Directives:   He does NOT have a Living Will. [Do you have a living will?: (Patient-Rptd) No]  He does NOT have a Power of  for Health Care. [Do you have a healthcare power of ?: (Patient-Rptd) No]  Not discussed      Problem List[1]  Allergies:  He is allergic to aspirin, caffeine, and lactose.    Current Medications:  Active Meds, Sig Only[2]    Medical History:  He  has a past medical history of Atherosclerosis of coronary artery, Fibroma, GERD (gastroesophageal reflux disease), Hearing impairment, High blood pressure, High cholesterol, Other and unspecified hyperlipidemia, Pyocele (11/05/2021), Thyroid disease, and Unspecified essential hypertension.  Surgical History:  He  has a past surgical history that includes hernia surgery; tonsillectomy; cataract; colonoscopy; angiogram; bypass surgery; cabg; removal of hydrocele,tunica,unilat (Left, 11/05/2021); and other surgical history (11/05/2021).   Family History:  His family history includes Cancer in his  maternal grandmother and mother; Diabetes in his paternal grandmother; Heart Disorder in his brother and father; Other in his brother and brother.  Social History:  He  reports that he has never smoked. He has never used smokeless tobacco. He reports that he does not drink alcohol and does not use drugs.    Tobacco:  He has never smoked tobacco.    CAGE Alcohol Screen:   CAGE screening score of 0 on 4/21/2025, showing low risk of alcohol abuse.      Patient Care Team:  Andreas Cuevas MD as PCP - General (Internal Medicine)  Neelam Lainez APRN (Internal Medicine)  Nj Pate MD (SURGERY, GENERAL)  Zack Gutierrez MD (UROLOGY)    Review of Systems     Negative except as above     Objective:   Physical Exam  /  Physical Exam  General Appearance:  Alert, cooperative, no distress, appears stated age   Head:  Normocephalic, without obvious abnormality, atraumatic   Eyes:  PERRL, conjunctiva/corneas clear, EOM's intact both eyes   Ears:  Normal TM's and external ear canals, both ears   Nose: Nares normal, septum midline,mucosa normal, no drainage or sinus tenderness   Throat: Lips, mucosa, and tongue normal; teeth and gums normal   Neck: Supple, symmetrical, trachea midline, no adenopathy;  thyroid: not enlarged, symmetric, no JVD   Back:   Symmetric, no curvature, ROM normal, no CVA tenderness   Lungs:   Clear to auscultation bilaterally, respirations unlabored   Heart:  Regular rate and rhythm, S1 and S2 normal,    Abdomen:   Soft, non-tender, bowel sounds active all four quadrants,  no masses,    Extremities: Extremities normal, atraumatic, no edema   Pulses: symmetric   Skin: Skin color, texture, turgor normal,    Lymph nodes: Cervical, supraclavicular nodes normal   Neurologic: Normal  reproducible low back pain with straight leg raise.           /72   Pulse 82   Temp 97.6 °F (36.4 °C)   Resp 16   Ht 5' 7\" (1.702 m)   Wt 184 lb (83.5 kg)   SpO2 99%   BMI 28.82 kg/m²  Estimated body mass index is  28.82 kg/m² as calculated from the following:    Height as of this encounter: 5' 7\" (1.702 m).    Weight as of this encounter: 184 lb (83.5 kg).    Bilateral barefoot skin diabetic exam is normal, visualized feet and the appearance is normal.  Bilateral monofilament/sensation of both feet is normal.  Pulsation pedal pulse exam of both lower legs/feet is normal as well.        Medicare Hearing Assessment:   Hearing Screening    Time taken: 4/21/2025  9:51 AM  Screening Method: Finger Rub  Finger Rub Result: Pass               Assessment & Plan:   Uziel Mayberry is a 84 year old male who presents for a Medicare Assessment.     1. Routine general medical examination at a health care facility (Primary)  2. Atherosclerosis of aorta  stable  statin.   3. Bronchiectasis without complication (HCC)  stable  complete PFTs and see [facility] pulm.   -     Cancel: Pulmonary Referral - In Network  -     Pulmonary Referral - In Network  4. Controlled type 2 diabetes mellitus without complication, without long-term current use of insulin (HCC) diet controlled.  A1c acceptable    5. Thrombocytopenia  resolved on most recent labs  monitor.   6. Coronary artery disease involving native coronary artery of native heart without angina pectoris  7. Dyslipidemia  statin  LDL to goal.   -     CARDIO - INTERNAL  8. History of myocardial infarction  stable  monitor.   -     CARDIO - INTERNAL  9. S/P CABG (coronary artery bypass graft)  stable  monitor.   -     CARDIO - INTERNAL  10. SOB (shortness of breath) on exertion  stable as above  to have PFTs done and see pulmonary for fu  -     Cancel: Pulmonary Referral - In Network  -     Pulmonary Referral - In Network  11. Calcified granuloma of lung  stable  monitor.   12. Pulmonary nodules  stable  monitor.   13. Acquired hypothyroidism  14. Vitamin D deficiency  15. Degeneration of intervertebral disc of lumbar region with discogenic back pain  as below    -     Physical Therapy Referral -  [facility]  -     PHYSIATRY - INTERNAL  16. Lumbar radiculopathy  acure flares  as below.   -     Physical Therapy Referral - [place] Location    -     PHYSIATRY - INTERNAL  17. Esophageal thickening  defers further evaluation  will monitor.   18. Benign prostatic hyperplasia with urinary frequency  flomax  stabl e   19. History of DVT in adulthood  stable  monitor  no DOAC  20. Lipoma of left lower extremity  stable  monitor.   21. Normocytic anemia  stable  monitor.   22. Bilateral hearing loss, unspecified hearing loss type  with impacted cerumen  see ENT.   23. Stage 3a chronic kidney disease (HCC)  stable  monitor.   24. Bilateral impacted cerumen  to see Dr Givens again for removal.    -     ENT - INTERNAL    Assessment & Plan  Wellness Visit  Annual wellness visit conducted. Blood pressure stable. Declined PCV 20. Blood work satisfactory.  - Continue current medications and management.  - Monitor blood pressure regularly.    Shortness of breath with exertion  Persistent exertional dyspnea. Pulmonary function tests pending. - Schedule and complete pulmonary function tests.  Follow up w pulmonary     Coronary artery disease and CABG  Coronary artery disease with CABG history. Under Dr. Anne's care. - Continue follow-up with Dr. Anne.  Statin plavix    Pulmonary nodules  Stable pulmonary nodules based on CT comparisons from 2022 to 2024. Continues pulmonologist follow-up.  - Continue follow-up with pulmonologist.    Lumbar radiculopathy  Lumbar radiculopathy with recent persistent flares. Back pain exacerbated by walking, relieved by sitting. Interested in physical therapy. Potential physiatrist referral if physical therapy ineffective.  - Ref[facility] to [facility] at [facility].  - Consider referral to Dr. Carreno, physiatrist, if physical therapy is ineffective.    Type 2 diabetes mellitus, diet controlled  Diet-controlled Type 2 diabetes mellitus with A1c of 6.2. No medications required.  Advised continued monitoring and dietary management.  Eye exam completed annually  has upcomgin appt.   - Continue dietary management and monitoring of blood glucose levels.    Dyslipidemia  Dyslipidemia well controlled with LDL at 63 on atorvastatin 20 mg.  - Continue atorvastatin 20 mg daily.    Hypothyroidism  Hypothyroidism well-managed on levothyroxine 25 mcg with stable TSH levels.  - Continue levothyroxine 25 mcg daily.    Benign prostatic hyperplasia  Benign prostatic hyperplasia managed with tamsulosin.  - Continue tamsulosin as prescribed.      The patient indicates understanding of these issues and agrees to the plan.  Reinforced healthy diet, lifestyle, and exercise.      No follow-ups on file.     CARLY York, 4/21/2025     Supplementary Documentation:   General Health:  In the past six months, have you lost more than 10 pounds without trying?: (Patient-Rptd) 2 - No  Has your appetite been poor?: (Patient-Rptd) No  Type of Diet: (Patient-Rptd) Balanced  How would you describe your daily physical activity?: (Patient-Rptd) None  How would you describe your current health state?: (Patient-Rptd) Fair  How do you maintain positive mental well-being?: (Patient-Rptd) Social Interaction  On a scale of 0 to 10, with 0 being no pain and 10 being severe pain, what is your pain level?: (Patient-Rptd) 2 - (Mild)  In the past six months, have you experienced urine leakage?: (Patient-Rptd) 0-No  At any time do you feel concerned for the safety/well-being of yourself and/or your children, in your home or elsewhere?: (Patient-Rptd) No  Have you had any immunizations at another office such as Influenza, Hepatitis B, Tetanus, or Pneumococcal?: (Patient-Rptd) No    Health Maintenance   Topic Date Due    Zoster Vaccines (3 of 3) 08/19/2019    COVID-19 Vaccine (4 - 2024-25 season) 09/01/2024    Annual Depression Screening  01/01/2025    Fall Risk Screening (Annual)  01/01/2025    Diabetes Care: Foot Exam (Annual)   01/01/2025    Annual Physical  03/06/2025    Diabetes Care Dilated Eye Exam  04/05/2025    Influenza Vaccine (Season Ended) 10/01/2025    Diabetes Care A1C  10/17/2025    Diabetes Care: GFR  04/17/2026    Diabetes Care: Microalb/Creat Ratio (Annual)  Completed    Pneumococcal Vaccine: 50+ Years  Completed    Meningococcal B Vaccine  Aged Out    Colorectal Cancer Screening  Discontinued          The following individual(s) verbally consented to be recorded using ambient AI listening technology and understand that they can each withdraw their consent to this listening technology at any point by asking the clinician to turn off or pause the recording:    Patient name: Uziel Mayberry  Additional names:           [1]   Patient Active Problem List  Diagnosis    Dyslipidemia    Vitamin D deficiency    BPH (benign prostatic hyperplasia)    ED (erectile dysfunction)    History of myocardial infarction    CAD (coronary artery disease)    S/P CABG (coronary artery bypass graft)    Thrombocytopenia    Controlled type 2 diabetes mellitus without complication, without long-term current use of insulin (HCC)    Acquired hypothyroidism    Normocytic anemia    Lipoma of left lower extremity    Lumbar degenerative disc disease    Esophageal thickening    Pulmonary nodules    History of DVT in adulthood    Atherosclerosis of aorta    Calcified granuloma of lung    Bronchiectasis without complication (HCC)    SOB (shortness of breath) on exertion    Lumbar radiculopathy    History of hydrocele    Bilateral hearing loss    Stage 3a chronic kidney disease (HCC)   [2]   Outpatient Medications Marked as Taking for the 4/21/25 encounter (Office Visit) with Neelam Lainez APRN   Medication Sig    levothyroxine 25 MCG Oral Tab Take 1 tablet (25 mcg total) by mouth before breakfast.    atorvastatin 20 MG Oral Tab Take 1 tablet (20 mg total) by mouth nightly.    tamsulosin 0.4 MG Oral Cap Take 1 capsule (0.4 mg total) by mouth nightly.     clopidogrel 75 MG Oral Tab Take 1 tablet (75 mg total) by mouth daily.    Cholecalciferol 5000 units Oral Tab Take 1 tablet (5,000 Units total) by mouth daily.    magnesium 250 MG Oral Tab Take 1 tablet (250 mg total) by mouth as needed (for cramps).

## 2025-04-24 ENCOUNTER — TELEPHONE (OUTPATIENT)
Dept: PHYSICAL THERAPY | Age: 85
End: 2025-04-24

## 2025-05-12 RX ORDER — CLOPIDOGREL BISULFATE 75 MG/1
75 TABLET ORAL DAILY
Qty: 30 TABLET | Refills: 0 | OUTPATIENT
Start: 2025-05-12

## 2025-05-12 NOTE — TELEPHONE ENCOUNTER
Clopidogrel 75mg: [facility] manages per office visit on 04/21/2025    Office visit on 04/21/2025 with CARLY York:  Coronary artery disease and CABG  Coronary artery disease with CABG history. Under Dr. Anne's care. - Continue follow-up with Dr. Anne.  Statin plavix

## 2025-05-22 ENCOUNTER — OFFICE VISIT (OUTPATIENT)
Facility: LOCATION | Age: 85
End: 2025-05-22
Payer: MEDICARE

## 2025-05-22 DIAGNOSIS — H93.293 ABNORMAL AUDITORY PERCEPTION OF BOTH EARS: ICD-10-CM

## 2025-05-22 DIAGNOSIS — H61.23 BILATERAL IMPACTED CERUMEN: Primary | ICD-10-CM

## 2025-05-22 PROCEDURE — 99213 OFFICE O/P EST LOW 20 MIN: CPT | Performed by: OTOLARYNGOLOGY

## 2025-05-22 NOTE — PROGRESS NOTES
Uziel Mayberry is a 84 year old male.   Chief Complaint   Patient presents with    Cerumen Impaction     HPI:   He complains of decreased hearing usually associated with wax.  There is no ear pain.    REVIEW OF SYSTEMS:   GENERAL HEALTH: feels well otherwise  GENERAL : denies fever, chills, sweats, weight loss, weight gain  SKIN: denies any unusual skin lesions or rashes  RESPIRATORY: denies shortness of breath with exertion  NEURO: denies headaches    EXAM:   There were no vitals taken for this visit.    System Findings Details   Constitutional  Overall appearance - Normal.   Psychiatric  Orientation - Oriented to time, place, person & situation. Appropriate mood and affect.   Head/Face  Facial features -- Normal. Skull - Normal.   Eyes  Pupils equal ,round ,react to light and accomidate   Ears, Nose, Throat, Neck  Small ear canals with wax impactions bilaterally removed today under the microscope.  No infection   Neurological  Memory - Normal. Cranial nerves - Cranial nerves II through XII grossly intact.   Lymph Detail  Submental. Submandibular. Anterior cervical. Posterior cervical. Supraclavicular.       ASSESSMENT AND PLAN:   1. Bilateral impacted cerumen  Removed today.    2. Abnormal auditory perception of both ears  Due to wax.  He will see me in 6 months.      The patient indicates understanding of these issues and agrees to the plan.    No follow-ups on file.    Hans Givens MD  5/22/2025  4:13 PM

## 2025-07-09 RX ORDER — TAMSULOSIN HYDROCHLORIDE 0.4 MG/1
0.4 CAPSULE ORAL NIGHTLY
Qty: 90 CAPSULE | Refills: 3 | Status: SHIPPED | OUTPATIENT
Start: 2025-07-09

## (undated) DEVICE — TIP CLEANER: Brand: VALLEYLAB

## (undated) DEVICE — SUTURE POLYDEK 2-0

## (undated) DEVICE — Device

## (undated) DEVICE — SUTURE VICRYL 3-0 SH

## (undated) DEVICE — CELL SAVER BAG 600ML 4R2023

## (undated) DEVICE — STERILE POLYISOPRENE POWDER-FREE SURGICAL GLOVES: Brand: PROTEXIS

## (undated) DEVICE — CELL SAVER 5/5+ BOWL KIT-225ML: Brand: HAEMONETICS CELL SAVER 5/5+ SYSTEMS

## (undated) DEVICE — REM POLYHESIVE ADULT PATIENT RETURN ELECTRODE: Brand: VALLEYLAB

## (undated) DEVICE — SUTURE SILK 2-0

## (undated) DEVICE — SOL LACT RINGERS 1000ML

## (undated) DEVICE — STERILE SYNTHETIC POLYISOPRENE POWDER-FREE SURGICAL GLOVES WITH HYDROGEL COATING: Brand: PROTEXIS

## (undated) DEVICE — HEMOCLIP HORIZON LG 004200

## (undated) DEVICE — STRL PENROSE DRAIN 18" X 1/4": Brand: CARDINAL HEALTH

## (undated) DEVICE — FIXED CORE WIRE GUIDE SAFE-T-J, CURVED: Brand: COOK

## (undated) DEVICE — GAUZE SPONGES,12 PLY: Brand: CURITY

## (undated) DEVICE — CELL SAVER RESERVOIR BRAT

## (undated) DEVICE — BLADE STERNAL SAW BULK PACK

## (undated) DEVICE — PREMIUM WET SKIN PREP TRAY: Brand: MEDLINE INDUSTRIES, INC.

## (undated) DEVICE — TRAY ENDOVEIN KTV16 HARVESTING

## (undated) DEVICE — TRANSPOSAL ULTRAFLEX DUO/QUAD ULTRA CART MANIFOLD

## (undated) DEVICE — DRAPE SLUSH/WARMER W/DISC

## (undated) DEVICE — SOL  .9 1000ML BTL

## (undated) DEVICE — OPEN HEART: Brand: MEDLINE INDUSTRIES, INC.

## (undated) DEVICE — SCD SLEEVE KNEE HI BLEND

## (undated) DEVICE — SUTURE ETHILON 2-0 FS

## (undated) DEVICE — MINI LAP PACK-LF: Brand: MEDLINE INDUSTRIES, INC.

## (undated) DEVICE — GLOVE SURG TRIUMPH SZ 8

## (undated) DEVICE — TIBURON DRAPE TOWELS: Brand: CONVERTORS

## (undated) DEVICE — SUTURE CHROMIC GUT 3-0 SH

## (undated) DEVICE — VIOLET BRAIDED (POLYGLACTIN 910), SYNTHETIC ABSORBABLE SUTURE: Brand: COATED VICRYL

## (undated) DEVICE — LAPAROTOMY SPONGE - RF AND X-RAY DETECTABLE PRE-WASHED: Brand: SITUATE

## (undated) DEVICE — SUPER SPONGES,MEDIUM: Brand: KERLIX

## (undated) NOTE — LETTER
[facility]  Sandeepmaki Delunadariel 60 [address] [facility], [address] [place] [address]    Consent for Operation    Date: __________________    Time: _______________    1.  I authorize the performance upon [facility] the following operation:    Procedure(s):   CORONARY ART procedure has been videotaped, the surgeon will obtain the original videotape. The hospital will not be responsible for storage or maintenance of this tape.     6. For the purpose of advancing medical education, I consent to the admittance of observers to t STATEMENTS REQUIRING INSERTION OR COMPLETION WERE FILLED IN.     Signature of Patient:   ___________________________    When the patient is a minor or mentally incompetent to give consent:  Signature of person authorized to consent for patient: ____________ supplements, and pills I can buy without a prescription (including street drugs/illegal medications). Failure to inform my anesthesiologist about these medicines may increase my risk of anesthetic complications.   · If I am allergic to anything or have had Anesthesiologist Signature     Date   Time  I have discussed the procedure and information above with the patient (or patient’s representative) and answered their questions. The patient or their representative has agreed to have anesthesia services.     ___

## (undated) NOTE — LETTER
07/01/21        [address] [id number]      Dear Donald Means,    Our records indicate that you have outstanding lab work and or testing that was ordered for you and has not yet been completed:  Orders Placed This Encounter

## (undated) NOTE — LETTER
[facility] [address], [address] [place] [address]    Consent for Anesthesia   1.    Mary GRIMM 82 agree to be cared for by a physician anesthesiologist alone and/or with a nurse anesthetist, who is specially trained to monitor me and g procedure, allergic reactions to medications, injury to my airway, heart, lungs, vision, nerves, or muscles and in extremely rare instances death. 5. My doctor has explained to me other choices available to me for my care (alternatives).   6. Pregnant Jackelyn Printed: 11/4/2021 at 2:19 PM    Medical Record #: [id number]                                            Page 1 of 1

## (undated) NOTE — LETTER
[facility] [address], [address]  Consent for Procedure/Sedation    Date: 8/24/2017   Time: _________      1.  I authorize the performance upon [facility] the following:cardiac catheterization, left ventricular cineang period, the physician will determine when the applicable recovery period ends for purposes of reinstating the Do Not Resuscitate (DNR) order.     Signature of Patient: ____________________________________________________    Signature of person authorized

## (undated) NOTE — LETTER
Sherrie Finn 89  [address] [facility], [address] [facility]  Authorization for Surgical Operation and Procedure   Date:___________ agencies, I may still be subject to ill effects as a result of receiving a blood transfusion and/or blood products.   The following are some, but not all, of the potential risks that can occur: fever and allergic reactions, hemolytic reactions, transmission and during the recovery period unless otherwise explicitly stated by me (or a person authorized to consent on my behalf).  The surgeon or my attending physician will determine when the applicable recovery period ends for purposes of reinstating the DNAR ord

## (undated) NOTE — LETTER
Sherrie Finn 89  [address] [facility] [address], [address] [facility]  Authorization for Surgical Operation and Procedure     Date:___________                                                                                                         Time:__________ not all, of the potential risks that can occur: fever and allergic reactions, hemolytic reactions, transmission of diseases such as Hepatitis, AIDS and Cytomegalovirus (CMV) and fluid overload.   In the event that I wish to have an autologous transfusion of attending physician will determine when the applicable recovery period ends for purposes of reinstating the DNAR order.   10. Patients having a sterilization procedure: I understand that if the procedure is successful the results will be permanent and it wi to: a. Allow the anesthesiologist (anesthesia doctor) to give me medicine and do additional procedures as necessary.  Some examples are: Starting or using an “IV” to give me medicine, fluids or blood during my procedure, and having a breathing tube placed (“spinal”, “epidural”, & “nerve blocks”): I understand that rare but potential complications include headache, bleeding, infection, seizure, irregular heart rhythms, and nerve injury.     I can change my mind about having anesthesia services at any time be